# Patient Record
Sex: FEMALE | Race: WHITE | NOT HISPANIC OR LATINO | Employment: OTHER | URBAN - METROPOLITAN AREA
[De-identification: names, ages, dates, MRNs, and addresses within clinical notes are randomized per-mention and may not be internally consistent; named-entity substitution may affect disease eponyms.]

---

## 2017-02-26 ENCOUNTER — MYC MEDICAL ADVICE (OUTPATIENT)
Dept: FAMILY MEDICINE | Facility: CLINIC | Age: 67
End: 2017-02-26

## 2017-02-26 DIAGNOSIS — K92.1 BLOOD IN STOOL: Primary | ICD-10-CM

## 2017-02-27 ENCOUNTER — MYC MEDICAL ADVICE (OUTPATIENT)
Dept: FAMILY MEDICINE | Facility: CLINIC | Age: 67
End: 2017-02-27

## 2017-02-27 NOTE — TELEPHONE ENCOUNTER
Message handled by Nurse Triage with [employer] - provider name: MELINA LEE.    See Mychart response.      Becky Wilson RN    [facility]

## 2017-03-13 ENCOUNTER — TELEPHONE (OUTPATIENT)
Dept: FAMILY MEDICINE | Facility: CLINIC | Age: 67
End: 2017-03-13

## 2017-05-13 ENCOUNTER — OFFICE VISIT (OUTPATIENT)
Dept: FAMILY MEDICINE | Facility: CLINIC | Age: 67
End: 2017-05-13
Payer: COMMERCIAL

## 2017-05-13 VITALS
OXYGEN SATURATION: 100 % | HEART RATE: 104 BPM | DIASTOLIC BLOOD PRESSURE: 76 MMHG | TEMPERATURE: 99 F | SYSTOLIC BLOOD PRESSURE: 134 MMHG | BODY MASS INDEX: 29.84 KG/M2 | RESPIRATION RATE: 12 BRPM | WEIGHT: 152 LBS | HEIGHT: 60 IN

## 2017-05-13 DIAGNOSIS — Z87.19 HISTORY OF COLITIS: ICD-10-CM

## 2017-05-13 DIAGNOSIS — R19.7 DIARRHEA, UNSPECIFIED TYPE: Primary | ICD-10-CM

## 2017-05-13 LAB
BASOPHILS # BLD AUTO: 0 10E9/L (ref 0–0.2)
BASOPHILS NFR BLD AUTO: 0.2 %
DIFFERENTIAL METHOD BLD: NORMAL
EOSINOPHIL # BLD AUTO: 0.3 10E9/L (ref 0–0.7)
EOSINOPHIL NFR BLD AUTO: 3 %
ERYTHROCYTE [DISTWIDTH] IN BLOOD BY AUTOMATED COUNT: 13.7 % (ref 10–15)
ERYTHROCYTE [SEDIMENTATION RATE] IN BLOOD BY WESTERGREN METHOD: 15 MM/H (ref 0–30)
HCT VFR BLD AUTO: 39.6 % (ref 35–47)
HGB BLD-MCNC: 13.2 G/DL (ref 11.7–15.7)
LYMPHOCYTES # BLD AUTO: 1.4 10E9/L (ref 0.8–5.3)
LYMPHOCYTES NFR BLD AUTO: 14.9 %
MCH RBC QN AUTO: 27.8 PG (ref 26.5–33)
MCHC RBC AUTO-ENTMCNC: 33.3 G/DL (ref 31.5–36.5)
MCV RBC AUTO: 84 FL (ref 78–100)
MONOCYTES # BLD AUTO: 1.1 10E9/L (ref 0–1.3)
MONOCYTES NFR BLD AUTO: 11.5 %
NEUTROPHILS # BLD AUTO: 6.7 10E9/L (ref 1.6–8.3)
NEUTROPHILS NFR BLD AUTO: 70.4 %
PLATELET # BLD AUTO: 354 10E9/L (ref 150–450)
RBC # BLD AUTO: 4.74 10E12/L (ref 3.8–5.2)
WBC # BLD AUTO: 9.5 10E9/L (ref 4–11)

## 2017-05-13 PROCEDURE — 99214 OFFICE O/P EST MOD 30 MIN: CPT | Performed by: PHYSICIAN ASSISTANT

## 2017-05-13 PROCEDURE — 85652 RBC SED RATE AUTOMATED: CPT | Performed by: PHYSICIAN ASSISTANT

## 2017-05-13 PROCEDURE — 80053 COMPREHEN METABOLIC PANEL: CPT | Performed by: PHYSICIAN ASSISTANT

## 2017-05-13 PROCEDURE — 36415 COLL VENOUS BLD VENIPUNCTURE: CPT | Performed by: PHYSICIAN ASSISTANT

## 2017-05-13 PROCEDURE — 85025 COMPLETE CBC W/AUTO DIFF WBC: CPT | Performed by: PHYSICIAN ASSISTANT

## 2017-05-13 PROCEDURE — 86140 C-REACTIVE PROTEIN: CPT | Performed by: PHYSICIAN ASSISTANT

## 2017-05-13 PROCEDURE — 82272 OCCULT BLD FECES 1-3 TESTS: CPT | Performed by: PHYSICIAN ASSISTANT

## 2017-05-13 NOTE — NURSING NOTE
"Chief Complaint   Patient presents with     Diarrhea       Initial /76  Pulse 104  Temp 99  F (37.2  C) (Tympanic)  Resp 12  Ht 4' 11.5\" (1.511 m)  Wt 152 lb (68.9 kg)  LMP 06/03/2004  SpO2 100%  Breastfeeding? No  BMI 30.19 kg/m2 Estimated body mass index is 30.19 kg/(m^2) as calculated from the following:    Height as of this encounter: 4' 11.5\" (1.511 m).    Weight as of this encounter: 152 lb (68.9 kg).  Medication Reconciliation: complete   Lauren Bloedow LPN    "

## 2017-05-13 NOTE — MR AVS SNAPSHOT
After Visit Summary   5/13/2017    Jael Car    MRN: [id number]           Patient Information     Date Of Birth          1950        Visit Information        Provider Department      5/13/2017 11:00 AM Mary Jane Pineda PA-C [facility]        Today's Diagnoses     Diarrhea, unspecified type    -  1    History of colitis          Care Instructions    -Be seen urgently with fevers, severe abdominal pain, significant blood in stool        Follow-ups after your visit        Future tests that were ordered for you today     Open Future Orders        Priority Expected Expires Ordered    Enteric Bacteria and Virus Panel by ESTUARDO Stool Routine  5/13/2018 5/13/2017    Clostridium difficile Toxin B PCR Routine  5/13/2018 5/13/2017    CT Abdomen Pelvis w Contrast Routine  5/13/2018 5/13/2017            Who to contact     If you have questions or need follow up information about today's clinic visit or your schedule please contact [facility] directly at [phone].  Normal or non-critical lab and imaging results will be communicated to you by [employer], letter or phone within 4 business days after the clinic has received the results. If you do not hear from us within 7 days, please contact the clinic through [employer] or phone. If you have a critical or abnormal lab result, we will notify you by phone as soon as possible.  Submit refill requests through [employer] or call your pharmacy and they will forward the refill request to us. Please allow 3 business days for your refill to be completed.          Additional Information About Your Visit        MyChart Information     [employer] gives you secure access to your electronic health record. If you see a primary care provider, you can also send messages to your care team and make appointments. If you have questions, please call your primary care clinic.  If you do not have a primary care provider, please call [phone] and they  "will assist you.        Care EveryWhere ID     This is your Care EveryWhere ID. This could be used by other organizations to access your [place] medical records  [id number]        Your Vitals Were     Pulse Temperature Respirations Height Last Period Pulse Oximetry    104 99  F (37.2  C) (Tympanic) 12 4' 11.5\" (1.511 m) 06/03/2004 100%    Breastfeeding? BMI (Body Mass Index)                No 30.19 kg/m2           Blood Pressure from Last 3 Encounters:   05/13/17 134/76   11/25/16 138/84   10/04/16 138/82    Weight from Last 3 Encounters:   05/13/17 152 lb (68.9 kg)   11/25/16 160 lb (72.6 kg)   10/04/16 159 lb (72.1 kg)              We Performed the Following     CBC with platelets and differential     Comprehensive metabolic panel (BMP + Alb, Alk Phos, ALT, AST, Total. Bili, TP)     CRP, inflammation     ESR: Erythrocyte sedimentation rate     Occult blood stool        Primary Care Provider Office Phone # Fax #    Ken Gardiner [phone]       [facility] [address] [address]        Thank you!     Thank you for choosing [facility]  for your care. Our goal is always to provide you with excellent care. Hearing back from our patients is one way we can continue to improve our services. Please take a few minutes to complete the written survey that you may receive in the mail after your visit with us. Thank you!             Your Updated Medication List - Protect others around you: Learn how to safely use, store and throw away your medicines at www.disposemymeds.org.          This list is accurate as of: 5/13/17 11:37 AM.  Always use your most recent med list.                   Brand Name Dispense Instructions for use    aspirin EC 81 MG EC tablet      Take 81 mg by mouth daily.       CALCIUM GUMMIES 250-100-500 MG-MG-UNIT Chew   Generic drug:  Calcium-Phosphorus-Vitamin D      Take by mouth 2 times daily       * cholecalciferol 5000 UNITS Caps " capsule    vitamin D3     Take by mouth every other day       * VITAJOY DAILY D GUMMIES 1000 UNITS Chew   Generic drug:  cholecalciferol     30 tablet        multivitamin  peds with iron 60 MG chewable tablet      Take 1 chew tab by mouth daily.       simvastatin 40 MG tablet    ZOCOR    90 tablet    Take 1 tablet (40 mg) by mouth At Bedtime       * Notice:  This list has 2 medication(s) that are the same as other medications prescribed for you. Read the directions carefully, and ask your doctor or other care provider to review them with you.

## 2017-05-13 NOTE — PROGRESS NOTES
SUBJECTIVE:                                                    Jael Car is a 67 year old female who presents to clinic today for the following health issues:    Diarrhea     Onset: 2017    Description:   Consistency of stool: loose, yellow, explosive and mucousy  Blood in stool: YES  Number of loose stools in past 24 hours: 40    Progression of Symptoms:  worsening    Accompanying Signs & Symptoms:  Fever: no   Nausea or vomiting; no   Abdominal pain: YES  Episodes of constipation: yes  Weight loss: YES   History:   Ill contacts: no   Recent use of antibiotics: no    Recent travels: no          Recent medication-new or changes(Rx or OTC): no     Precipitating factors:   Hx of Blood in Stool    Alleviating factors:          Therapies Tried and outcome:  none; Outcome:     Diarrhea has been occurring approx every other day since 17.  Starts in the evening, around 6 or 7PM and then she is up all night. Has dozens of stools.  Small amounts, loose, runny.  Stools are urgent; has not made it to the bathroom a few times.  Stools yellow on Mon and Tu. Now brown-tan    Denies fevers or night sweats    Symptoms less severe when she doesn't eat, so she has been avoiding eating.    No blood in the toilet but sees blood with wiping.    Denies recent travel  Denies eating questionable foods  No nausea or vomiting    Has generalized abdominal cramping (upper abdomen, periumbilical). Denies LLQ pain.  Denies rectal pain, other than irritation from wiping    History of colitis--seen on previous colonoscopy, but patient had no symptoms at that time  Did see GI after that colonoscopy and they tried her on some medications. The first was too expensive, and she had a hard time tolerating the other one she tried.    Has lost 5 pounds due to not eating  Trying to drink fluids. Drank 7UP and ginger ale recently, which triggered symptoms.    Can't pass gas    Denies FH of GI problems    PSH: History of   sections  Still has gallbladder and appendix    Has been in busy season at work; increased stress    Problem list and histories reviewed & adjusted, as indicated.  Additional history: as documented    Patient Active Problem List   Diagnosis     Chondromalacia of patella     Hyperlipidemia LDL goal <130     Lumbago     Pulmonary embolism and infarction (H)     S/P shoulder surgery     Advanced directives, counseling/discussion     DVT (deep venous thrombosis) (H)     Rotator cuff (capsule) sprain     Osteopenia     Carpal tunnel syndrome     Hyperglycemia     Subclinical hyperthyroidism     Osteoarthrosis of knee     Obesity     History of pulmonary embolism     Primary osteoarthritis of right knee     MTHFR mutation (H)     History of deep venous thrombosis     Elevated serum protein level     Past Surgical History:   Procedure Laterality Date     COLONOSCOPY  09, 9/15    colitis, due 5 yrs     SURGICAL HISTORY OF -   2011    Rt Rotator Cuff       Social History   Substance Use Topics     Smoking status: Never Smoker     Smokeless tobacco: Never Used      Comment: 30 years ago     Alcohol use 1.2 - 2.4 oz/week     2 - 4 Standard drinks or equivalent per week      Comment: 1-2 drinks about 2 times/week     Family History   Problem Relation Age of Onset     Alcohol/Drug Father      Lipids Father      high cholesterol     DIABETES Paternal Grandmother      DIABETES Son      diet controlled     DIABETES Son          Current Outpatient Prescriptions   Medication Sig Dispense Refill     simvastatin (ZOCOR) 40 MG tablet Take 1 tablet (40 mg) by mouth At Bedtime 90 tablet 3     cholecalciferol (VITAJOY DAILY D GUMMIES) 1000 UNITS CHEW  30 tablet      Calcium-Phosphorus-Vitamin D (CALCIUM GUMMIES) 250-100-500 MG-MG-UNIT CHEW Take by mouth 2 times daily       cholecalciferol (VITAMIN D3) 5000 UNITS CAPS capsule Take by mouth every other day       multivitamin  peds with iron (FLINTSTONES COMPLETE) 60 MG chewable tablet Take 1  "chew tab by mouth daily.       aspirin EC 81 MG tablet Take 81 mg by mouth daily.       No Known Allergies    Reviewed and updated as needed this visit by clinical staff       Reviewed and updated as needed this visit by Provider         ROS:  Constitutional, HEENT, cardiovascular, pulmonary, gi and gu systems are negative, except as otherwise noted.    OBJECTIVE:                                                    /76  Pulse 104  Temp 99  F (37.2  C) (Tympanic)  Resp 12  Ht 4' 11.5\" (1.511 m)  Wt 152 lb (68.9 kg)  LMP 06/03/2004  SpO2 100%  Breastfeeding? No  BMI 30.19 kg/m2  Body mass index is 30.19 kg/(m^2).  GENERAL: healthy, alert and no distress  RESP: lungs clear to auscultation - no rales, rhonchi or wheezes  CV: regular rate and rhythm, normal S1 S2, no S3 or S4, no murmur, click or rub, no peripheral edema and peripheral pulses strong  ABDOMEN: tenderness LUQ, suprapubic and umbilical, no organomegaly or masses and bowel sounds normal  MS: no gross musculoskeletal defects noted, no edema  SKIN: no suspicious lesions or rashes  PSYCH: mentation appears normal, affect normal/bright    Diagnostic Test Results:   No results found for this or any previous visit (from the past 24 hour(s)).     ASSESSMENT/PLAN:                                                      1. Diarrhea, unspecified type  Most concerning for colitis flare, due to visual evidence of colitis on previous colonoscopy. Diverticulitis and infectious etiologies also on differential. Will check labs, including stool studies. Recommend CT scan on Mon/Tues; she prefers to start with this. Discussed that she may need GI follow-up and colonoscopy. Avoid anti-diarrheal medications at this time. Be seen urgently if fevers develop, severe abdominal pain develops, or significant bleeding occurs.  - CBC with platelets and differential  - ESR: Erythrocyte sedimentation rate  - CRP, inflammation  - Comprehensive metabolic panel (BMP + Alb, Alk " Phos, ALT, AST, Total. Bili, TP)  - Occult blood stool  - Enteric Bacteria and Virus Panel by ESTUARDO Stool; Future  - Clostridium difficile Toxin B PCR; Future  - CT Abdomen Pelvis w Contrast; Future    2. History of colitis  See above.  - CBC with platelets and differential  - ESR: Erythrocyte sedimentation rate  - CRP, inflammation  - Comprehensive metabolic panel (BMP + Alb, Alk Phos, ALT, AST, Total. Bili, TP)  - Occult blood stool  - Enteric Bacteria and Virus Panel by ESTUARDO Stool; Future  - Clostridium difficile Toxin B PCR; Future  - CT Abdomen Pelvis w Contrast; Future    See Patient Instructions    Mary Jane Pineda PA-C  [facility]

## 2017-05-15 LAB
ALBUMIN SERPL-MCNC: 3.5 G/DL (ref 3.4–5)
ALP SERPL-CCNC: 151 U/L (ref 40–150)
ALT SERPL W P-5'-P-CCNC: 57 U/L (ref 0–50)
ANION GAP SERPL CALCULATED.3IONS-SCNC: 10 MMOL/L (ref 3–14)
AST SERPL W P-5'-P-CCNC: 28 U/L (ref 0–45)
BILIRUB SERPL-MCNC: 0.3 MG/DL (ref 0.2–1.3)
BUN SERPL-MCNC: 11 MG/DL (ref 7–30)
CALCIUM SERPL-MCNC: 8.8 MG/DL (ref 8.5–10.1)
CHLORIDE SERPL-SCNC: 109 MMOL/L (ref 94–109)
CO2 SERPL-SCNC: 23 MMOL/L (ref 20–32)
CREAT SERPL-MCNC: 0.81 MG/DL (ref 0.52–1.04)
CRP SERPL-MCNC: 26 MG/L (ref 0–8)
GFR SERPL CREATININE-BSD FRML MDRD: 70 ML/MIN/1.7M2
GLUCOSE SERPL-MCNC: 115 MG/DL (ref 70–99)
POTASSIUM SERPL-SCNC: 3.3 MMOL/L (ref 3.4–5.3)
PROT SERPL-MCNC: 7.3 G/DL (ref 6.8–8.8)
SODIUM SERPL-SCNC: 142 MMOL/L (ref 133–144)

## 2017-05-16 NOTE — PROGRESS NOTES
Ms. Car,    -Liver and gallbladder tests (ALT,AST, Alk phos,bilirubin) are normal.  -Kidney function (GFR) is normal.  -Sodium is normal.  -Potassium is low - likely from the diarrhea.  -Glucose (diabetic screening test) is normal as you were not fasting.  -CRP is elevated and is a sign of inflammation.      If you have further questions about the interpretation of your labs, [email or web address] is a good website to check out for further information.    Let's proceed with the CT scan scheduled for 5/18/17 but if this is negative and the diarrhea persists, I agree with Mary Jane that you should have a colonoscopy to evaluate for the possibility of inflammatory bowel disease like colitis.    Please contact the clinic if you have additional questions.  Thank you.    Sincerely,    Juan Branch MD

## 2017-05-18 ENCOUNTER — HOSPITAL ENCOUNTER (OUTPATIENT)
Dept: CT IMAGING | Facility: CLINIC | Age: 67
Discharge: HOME | End: 2017-05-18
Attending: PHYSICIAN ASSISTANT | Admitting: PHYSICIAN ASSISTANT
Payer: MEDICARE

## 2017-05-18 DIAGNOSIS — Z87.19 HISTORY OF COLITIS: ICD-10-CM

## 2017-05-18 DIAGNOSIS — R19.7 DIARRHEA, UNSPECIFIED TYPE: ICD-10-CM

## 2017-05-18 PROCEDURE — 74177 CT ABD & PELVIS W/CONTRAST: CPT

## 2017-05-18 PROCEDURE — 25000128 H RX IP 250 OP 636: Performed by: PHYSICIAN ASSISTANT

## 2017-05-18 RX ORDER — IOPAMIDOL 755 MG/ML
500 INJECTION, SOLUTION INTRAVASCULAR ONCE
Status: COMPLETED | OUTPATIENT
Start: 2017-05-18 | End: 2017-05-18

## 2017-05-18 RX ADMIN — SODIUM CHLORIDE 49 ML: 9 INJECTION, SOLUTION INTRAVENOUS at 14:37

## 2017-05-18 RX ADMIN — IOPAMIDOL 76 ML: 755 INJECTION, SOLUTION INTRAVENOUS at 14:37

## 2017-05-20 ENCOUNTER — TELEPHONE (OUTPATIENT)
Dept: FAMILY MEDICINE | Facility: CLINIC | Age: 67
End: 2017-05-20

## 2017-05-20 DIAGNOSIS — K76.9 LIVER LESION, RIGHT LOBE: Primary | ICD-10-CM

## 2017-05-20 DIAGNOSIS — K51.30 ULCERATIVE RECTOSIGMOIDITIS WITHOUT COMPLICATION (H): ICD-10-CM

## 2017-05-20 RX ORDER — DIAZEPAM 10 MG
10 TABLET ORAL EVERY 6 HOURS PRN
Qty: 1 TABLET | Refills: 0 | Status: SHIPPED | OUTPATIENT
Start: 2017-05-20 | End: 2017-10-04

## 2017-05-20 NOTE — TELEPHONE ENCOUNTER
I called Jael with her result of her CT.  Her diarrhea has largely resolved.  CT shows new right liver lesion - recommended MRI to evaluate this further.  Order submitted.    Also having questions about how to optimally manage UC which she was diagnosed with in 2011.  Tried sulfasalazine in the past which she did not tolerate well.  Recommended referral back to Dr. Monae whom she saw once previously to discuss how to manage this.  Just manage flares when they arise?  Trial of alternate daily medication?  Instructed to call [facility] through [employer].    Juan Branch MD

## 2017-06-12 ENCOUNTER — HOSPITAL ENCOUNTER (OUTPATIENT)
Dept: MRI IMAGING | Facility: CLINIC | Age: 67
Discharge: HOME | End: 2017-06-12
Attending: FAMILY MEDICINE | Admitting: FAMILY MEDICINE
Payer: MEDICARE

## 2017-06-12 DIAGNOSIS — K76.9 LIVER LESION, RIGHT LOBE: ICD-10-CM

## 2017-06-12 PROCEDURE — 25000128 H RX IP 250 OP 636: Performed by: FAMILY MEDICINE

## 2017-06-12 PROCEDURE — A9585 GADOBUTROL INJECTION: HCPCS | Performed by: FAMILY MEDICINE

## 2017-06-12 PROCEDURE — 74183 MRI ABD W/O CNTR FLWD CNTR: CPT

## 2017-06-12 RX ORDER — GADOBUTROL 604.72 MG/ML
15 INJECTION INTRAVENOUS ONCE
Status: COMPLETED | OUTPATIENT
Start: 2017-06-12 | End: 2017-06-12

## 2017-06-12 RX ADMIN — GADOBUTROL 15 ML: 604.72 INJECTION INTRAVENOUS at 07:43

## 2017-06-13 NOTE — PROGRESS NOTES
Ms. Car,    As we'd hoped, the MRI confirms that the lesions seen on your liver on your recent CT scan are non-cancerous blood vessels called hemangiomas.  These are benign and nothing to worry about.  No need to do another scan to follow things up.      If you have further questions about the interpretation of your labs, [email or web address] is a good website to check out for further information.    Please contact the clinic if you have additional questions.  Thank you.    Sincerely,    Juan Branch MD

## 2017-08-22 ENCOUNTER — TRANSFERRED RECORDS (OUTPATIENT)
Dept: HEALTH INFORMATION MANAGEMENT | Facility: CLINIC | Age: 67
End: 2017-08-22

## 2017-09-27 ENCOUNTER — TRANSFERRED RECORDS (OUTPATIENT)
Dept: HEALTH INFORMATION MANAGEMENT | Facility: CLINIC | Age: 67
End: 2017-09-27

## 2017-10-04 ENCOUNTER — OFFICE VISIT (OUTPATIENT)
Dept: FAMILY MEDICINE | Facility: CLINIC | Age: 67
End: 2017-10-04
Payer: COMMERCIAL

## 2017-10-04 DIAGNOSIS — Z12.31 VISIT FOR SCREENING MAMMOGRAM: ICD-10-CM

## 2017-10-04 DIAGNOSIS — M15.0 PRIMARY OSTEOARTHRITIS INVOLVING MULTIPLE JOINTS: ICD-10-CM

## 2017-10-04 DIAGNOSIS — Z51.81 MEDICATION MONITORING ENCOUNTER: ICD-10-CM

## 2017-10-04 DIAGNOSIS — Z86.718 HISTORY OF DEEP VENOUS THROMBOSIS: ICD-10-CM

## 2017-10-04 DIAGNOSIS — Z12.11 SCREEN FOR COLON CANCER: ICD-10-CM

## 2017-10-04 DIAGNOSIS — E78.5 HYPERLIPIDEMIA LDL GOAL <130: ICD-10-CM

## 2017-10-04 DIAGNOSIS — R77.9 ELEVATED SERUM PROTEIN LEVEL: ICD-10-CM

## 2017-10-04 DIAGNOSIS — M85.89 OSTEOPENIA OF MULTIPLE SITES: ICD-10-CM

## 2017-10-04 DIAGNOSIS — Z15.89 MTHFR MUTATION: ICD-10-CM

## 2017-10-04 DIAGNOSIS — Z86.711 HISTORY OF PULMONARY EMBOLISM: ICD-10-CM

## 2017-10-04 DIAGNOSIS — E87.6 LOW BLOOD POTASSIUM: ICD-10-CM

## 2017-10-04 DIAGNOSIS — Z91.81 AT RISK FOR FALLING: ICD-10-CM

## 2017-10-04 DIAGNOSIS — Z12.39 SCREENING FOR BREAST CANCER: ICD-10-CM

## 2017-10-04 DIAGNOSIS — Z00.00 ENCOUNTER FOR ROUTINE ADULT HEALTH EXAMINATION WITHOUT ABNORMAL FINDINGS: Primary | ICD-10-CM

## 2017-10-04 DIAGNOSIS — K51.30 ULCERATIVE RECTOSIGMOIDITIS WITHOUT COMPLICATION (H): ICD-10-CM

## 2017-10-04 DIAGNOSIS — E05.90 SUBCLINICAL HYPERTHYROIDISM: ICD-10-CM

## 2017-10-04 LAB
ALBUMIN SERPL-MCNC: 3.5 G/DL (ref 3.4–5)
ALP SERPL-CCNC: 107 U/L (ref 40–150)
ALT SERPL W P-5'-P-CCNC: 24 U/L (ref 0–50)
ANION GAP SERPL CALCULATED.3IONS-SCNC: 9 MMOL/L (ref 3–14)
AST SERPL W P-5'-P-CCNC: 11 U/L (ref 0–45)
BILIRUB SERPL-MCNC: 0.4 MG/DL (ref 0.2–1.3)
BUN SERPL-MCNC: 16 MG/DL (ref 7–30)
CALCIUM SERPL-MCNC: 9 MG/DL (ref 8.5–10.1)
CHLORIDE SERPL-SCNC: 106 MMOL/L (ref 94–109)
CHOLEST SERPL-MCNC: 180 MG/DL
CK SERPL-CCNC: 31 U/L (ref 30–225)
CO2 SERPL-SCNC: 28 MMOL/L (ref 20–32)
CREAT SERPL-MCNC: 0.76 MG/DL (ref 0.52–1.04)
CRP SERPL-MCNC: <2.9 MG/L (ref 0–8)
DEPRECATED CALCIDIOL+CALCIFEROL SERPL-MC: 31 UG/L (ref 20–75)
ERYTHROCYTE [DISTWIDTH] IN BLOOD BY AUTOMATED COUNT: 14.1 % (ref 10–15)
ERYTHROCYTE [SEDIMENTATION RATE] IN BLOOD BY WESTERGREN METHOD: 9 MM/H (ref 0–30)
GFR SERPL CREATININE-BSD FRML MDRD: 75 ML/MIN/1.7M2
GLUCOSE SERPL-MCNC: 96 MG/DL (ref 70–99)
HCT VFR BLD AUTO: 39.6 % (ref 35–47)
HDLC SERPL-MCNC: 65 MG/DL
HGB BLD-MCNC: 13 G/DL (ref 11.7–15.7)
LDLC SERPL CALC-MCNC: 73 MG/DL
MCH RBC QN AUTO: 28.1 PG (ref 26.5–33)
MCHC RBC AUTO-ENTMCNC: 32.8 G/DL (ref 31.5–36.5)
MCV RBC AUTO: 86 FL (ref 78–100)
NONHDLC SERPL-MCNC: 115 MG/DL
PLATELET # BLD AUTO: 327 10E9/L (ref 150–450)
POTASSIUM SERPL-SCNC: 3.2 MMOL/L (ref 3.4–5.3)
PROT SERPL-MCNC: 7.2 G/DL (ref 6.8–8.8)
RBC # BLD AUTO: 4.62 10E12/L (ref 3.8–5.2)
SODIUM SERPL-SCNC: 143 MMOL/L (ref 133–144)
T3 SERPL-MCNC: 100 NG/DL (ref 60–181)
T4 FREE SERPL-MCNC: 1.22 NG/DL (ref 0.76–1.46)
TRIGL SERPL-MCNC: 208 MG/DL
TSH SERPL DL<=0.005 MIU/L-ACNC: 0.51 MU/L (ref 0.4–4)
WBC # BLD AUTO: 10.5 10E9/L (ref 4–11)

## 2017-10-04 PROCEDURE — 80053 COMPREHEN METABOLIC PANEL: CPT | Performed by: FAMILY MEDICINE

## 2017-10-04 PROCEDURE — 82550 ASSAY OF CK (CPK): CPT | Performed by: FAMILY MEDICINE

## 2017-10-04 PROCEDURE — 84439 ASSAY OF FREE THYROXINE: CPT | Performed by: FAMILY MEDICINE

## 2017-10-04 PROCEDURE — 85652 RBC SED RATE AUTOMATED: CPT | Performed by: FAMILY MEDICINE

## 2017-10-04 PROCEDURE — 85027 COMPLETE CBC AUTOMATED: CPT | Performed by: FAMILY MEDICINE

## 2017-10-04 PROCEDURE — 36415 COLL VENOUS BLD VENIPUNCTURE: CPT | Performed by: FAMILY MEDICINE

## 2017-10-04 PROCEDURE — 84443 ASSAY THYROID STIM HORMONE: CPT | Performed by: FAMILY MEDICINE

## 2017-10-04 PROCEDURE — 82306 VITAMIN D 25 HYDROXY: CPT | Performed by: FAMILY MEDICINE

## 2017-10-04 PROCEDURE — 86140 C-REACTIVE PROTEIN: CPT | Performed by: FAMILY MEDICINE

## 2017-10-04 PROCEDURE — G0439 PPPS, SUBSEQ VISIT: HCPCS | Performed by: FAMILY MEDICINE

## 2017-10-04 PROCEDURE — 84480 ASSAY TRIIODOTHYRONINE (T3): CPT | Performed by: FAMILY MEDICINE

## 2017-10-04 PROCEDURE — 80061 LIPID PANEL: CPT | Performed by: FAMILY MEDICINE

## 2017-10-04 RX ORDER — SIMVASTATIN 40 MG
40 TABLET ORAL AT BEDTIME
Qty: 90 TABLET | Refills: 3 | Status: SHIPPED | OUTPATIENT
Start: 2017-10-04 | End: 2018-10-31

## 2017-10-04 RX ORDER — PREDNISONE 20 MG/1
TABLET ORAL
Qty: 20 TABLET | Refills: 0 | COMMUNITY
Start: 2017-10-04 | End: 2018-10-31

## 2017-10-04 RX ORDER — BALSALAZIDE DISODIUM 750 MG/1
3000 CAPSULE ORAL 2 TIMES DAILY
COMMUNITY
Start: 2017-10-04

## 2017-10-04 NOTE — MR AVS SNAPSHOT
After Visit Summary   10/4/2017    Jael Car    MRN: [id number]           Patient Information     Date Of Birth          1950        Visit Information        Provider Department      10/4/2017 9:40 AM Ken Gardiner MD [facility] [place]        Today's Diagnoses     Encounter for routine adult health examination without abnormal findings    -  1    Ulcerative rectosigmoiditis 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 (H)        History of deep venous thrombosis        History of pulmonary embolism        MTHFR mutation (H)        Elevated serum protein level        Hyperlipidemia LDL goal <130        Osteopenia of multiple sites        Subclinical hyperthyroidism        Primary osteoarthritis involving multiple joints        Screening for breast cancer        Screen for colon cancer        Medication monitoring encounter        Visit for screening mammogram        At risk for falling          Care Instructions    Consider receiving Tetanus, influenza, and other vaccinations - as discussed    Follow up for Mammogram      [facility] [place]                        To reach your care team during and after hours:   [phone]  To reach our pharmacy:        [phone]    Clinic Hours                        Our clinic hours are:    Monday   7:30 am to 7:00 pm                  Tuesday through Friday 7:30 am to 5:00 pm                             Saturday   8:00 am to 12:00 pm      Sunday   Closed      Pharmacy Hours                        Our pharmacy hours are:    Monday   8:30 am to 7:00 pm       Tuesday to Friday  8:30 am to 6:00 pm                       Saturday    9:00 am to 1:00 pm              Sunday    Closed              There is also information available at our web site:  [email or web address]    If your provider ordered any lab tests and you do not receive the results within 10 business days, please call the clinic.    If you need a medication refill please contact your pharmacy.  Please allow  2-3 business days for your refill to be completed.    Our clinic offers telephone visits and e visits.  Please ask one of your team members to explain more.      Use [employer] (secure email communication and access to your chart) to send your primary care provider a message or make an appointment. Ask someone on your Team how to sign up for Cross[employer].  Immunizations                      Immunization History   Administered Date(s) Administered     Zoster vaccine, live 07/25/2013        Health Maintenance                         Health Maintenance Due   Topic Date Due     Pneumococcal Vaccine (1 of 2 - PCV13) 04/29/2016     Flu Vaccine - yearly  09/01/2017     FALL RISK ASSESSMENT  10/04/2017     Cholesterol Lab - yearly  10/04/2017           Services Typically covered by Medicare Recommended Completed   Vaccines    Pneumonoccol    Influenza    Hepatitis B (if medium/high risk)     Once for patients after age 65    Yearly  Medium/high risk factors:    End Stage Kidney Disease    Hemophiliacs who received Factor XIII or IX concentrates    Clients of institutions for developmentally disabled    Persons who live in same house as a Hepatitis B carrier    Homosexual men    Illicit injectable drug users    Health care workers     Mammogram Covered: One-time screen between age 35-39, annually for age 40+     Pap and Pelvic Exam Covered: Annually if  high risk,  or childbearing age with abnormal Pap in last 3 years.  Q24 months for all other women     Prostate Cancer Screening    Digital rectal exam    PSA Covered: Annually for all men > age 50     Corolrectal Cancer Screening Screening colonoscopy every 10 years, more often for high risk patients     Diabetes Self-Management Training Requires referral by treating physician for patient with diabetes     Diabetes Screening    Fasting blood sugar or glucose tolerance test   Once yearly, twice yearly if prediabetic     Cardiovascular Screening Blood Tests    Total  Cholesterol    HDL    Triglycerides Every 5 years     Medical Nutrition Therapy for Diabetes or Renal Disease Requires referral by treating physician for patient with diabetes or kidney disease     Glaucoma Screening Annually for patients with one of the following risk factors:    Diabetes Mellitus    Family history of Glaucoma    -American age 50 and over    -American age 65 and over     Bone Mass Measurement Every 24 months if one of the following risk factors:    Estrogen deficiency    Vertebral abnormalities on x-ray indicative of Osteoporosis, Osteopenia, or Vertebral fracture    Receiving/expected to receive the equivalent of at least 5 mg of Prednisone per day for > 3 months    Hyperparathyroidism    Patient being monitored for response to Osteoporosis Therapy     One-time AAA screen  Must be ordered as part of Medicare IPPE   Any patient with a family history of AAA    Males Age 65-75, with history of smoking at least 100 cigarettes in lifetime     Smoking Cessation Counseling Beneficiaries who use tobacco are eligible to receive 2 cessation attempts per year; each attempt includes maximum of 4 sessions     HIV Screening Annually for beneficiaries at increased risk:       Increased risk for HIV infection is defined in the  National Coverage Determinations (NCD) Manual,  Publication 100-03 Sections 190.14 (diagnostic) and 210.7 (screening). See http://www.cms.gov/manuals/downloads/fll308f6_Pvwb9.pdf and http://www.cms.gov/manuals/downloads/lwb592e4_Crmz1.pdf on the Internet.  Three times per pregnancy for beneficiaries who are pregnant.     Future Annual Wellness Visit Annually, for all beneficiaries.       Preventive Health Recommendations    Female Ages 65 +    Yearly exam:     See your health care provider every year in order to  o Review health changes.   o Discuss preventive care.    o Review your medicines if your doctor has prescribed any.      You no longer need a yearly Pap test unless  you've had an abnormal Pap test in the past 10 years. If you have vaginal symptoms, such as bleeding or discharge, be sure to talk with your provider about a Pap test.      Every 1 to 2 years, have a mammogram.  If you are over 69, talk with your health care provider about whether or not you want to continue having screening mammograms.      Every 10 years, have a colonoscopy. Or, have a yearly FIT test (stool test). These exams will check for colon cancer.       Have a cholesterol test every 5 years, or more often if your doctor advises it.       Have a diabetes test (fasting glucose) every three years. If you are at risk for diabetes, you should have this test more often.       At age 65, have a bone density scan (DEXA) to check for osteoporosis (brittle bone disease).    Shots:    Get a flu shot each year.    Get a tetanus shot every 10 years.    Talk to your doctor about your pneumonia vaccines. There are now two you should receive - Pneumovax (PPSV 23) and Prevnar (PCV 13).    Talk to your doctor about the shingles vaccine.    Talk to your doctor about the hepatitis B vaccine.    Nutrition:     Eat at least 5 servings of fruits and vegetables each day.      Eat whole-grain bread, whole-wheat pasta and brown rice instead of white grains and rice.      Talk to your provider about Calcium and Vitamin D.     Lifestyle    Exercise at least 150 minutes a week (30 minutes a day, 5 days a week). This will help you control your weight and prevent disease.      Limit alcohol to one drink per day.      No smoking.       Wear sunscreen to prevent skin cancer.       See your dentist twice a year for an exam and cleaning.      See your eye doctor every 1 to 2 years to screen for conditions such as glaucoma, macular degeneration and cataracts.          Follow-ups after your visit        Future tests that were ordered for you today     Open Future Orders        Priority Expected Expires Ordered    Fecal colorectal cancer  screen (FIT) Routine 10/25/2017 12/27/2017 10/4/2017    MA SCREENING DIGITAL BILAT - Future  (s+30) Routine  10/4/2018 10/4/2017            Who to contact     If you have questions or need follow up information about today's clinic visit or your schedule please contact [facility] directly at [phone].  Normal or non-critical lab and imaging results will be communicated to you by MyChart, letter or phone within 4 business days after the clinic has received the results. If you do not hear from us within 7 days, please contact the clinic through [employer]ar[employer] or phone. If you have a critical or abnormal lab result, we will notify you by phone as soon as possible.  Submit refill requests through [employer] or call your pharmacy and they will forward the refill request to us. Please allow 3 business days for your refill to be completed.          Additional Information About Your Visit        [employer] Information     [employer] gives you secure access to your electronic health record. If you see a primary care provider, you can also send messages to your care team and make appointments. If you have questions, please call your primary care clinic.  If you do not have a primary care provider, please call [phone] and they will assist you.        Care EveryWhere ID     This is your Care EveryWhere ID. This could be used by other organizations to access your [place] medical records  [id number]        Your Vitals Were     Last Period                   06/03/2004            Blood Pressure from Last 3 Encounters:   05/13/17 134/76   11/25/16 138/84   10/04/16 138/82    Weight from Last 3 Encounters:   05/13/17 152 lb (68.9 kg)   11/25/16 160 lb (72.6 kg)   10/04/16 159 lb (72.1 kg)              We Performed the Following     *UA reflex to Microscopic and Culture ([place] [facility] (except [place] and [place])     Albumin Random Urine Quantitative with Creat Ratio     CBC with platelets     CK total      Comprehensive metabolic panel     CRP inflammation     Erythrocyte sedimentation rate auto     Lipid panel reflex to direct LDL     T3, total     T4, free     TSH with free T4 reflex     Vitamin D Deficiency          Where to get your medicines      These medications were sent to [facility]  [place] [facility] - [address]  [address] [place] [address]     Phone:  [phone]     simvastatin 40 MG tablet          Primary Care Provider Office Phone # Fax #    Ken Gardiner [phone]       [address] [place] [address]        Equal Access to Services     [facility]: Hadii aad ku hadasho Soomaali, waaxda luqadaha, qaybta kaalmada adeegyada, waxyumiko roberts . So [facility] [phone].    ATENCIÓN: Si habla español, tiene a gary disposición servicios gratuitos de asistencia lingüística. [facility] [phone].    We comply with applicable federal civil rights laws and [place] laws. We do not discriminate on the basis of race, color, national origin, age, disability, sex, sexual orientation, or gender identity.            Thank you!     Thank you for choosing [facility]  for your care. Our goal is always to provide you with excellent care. Hearing back from our patients is one way we can continue to improve our services. Please take a few minutes to complete the written survey that you may receive in the mail after your visit with us. Thank you!             Your Updated Medication List - Protect others around you: Learn how to safely use, store and throw away your medicines at www.disposemymeds.org.          This list is accurate as of: 10/4/17 10:34 AM.  Always use your most recent med list.                   Brand Name Dispense Instructions for use Diagnosis    aspirin EC 81 MG EC tablet      Take 81 mg by mouth daily.        balsalazide 750 MG capsule    COLAZAL     Take 4 capsules (3,000 mg) by mouth 2 times  daily        CALCIUM GUMMIES 250-100-500 MG-MG-UNIT Chew   Generic drug:  Calcium-Phosphorus-Vitamin D      Take by mouth 2 times daily        * cholecalciferol 5000 UNITS Caps capsule    vitamin D3     Take by mouth every other day        * VITAJOY DAILY D GUMMIES 1000 UNITS Chew   Generic drug:  cholecalciferol     30 tablet         multivitamin  peds with iron 60 MG chewable tablet      Take 1 chew tab by mouth daily.        predniSONE 20 MG tablet    DELTASONE    20 tablet    Take 60 mg daily for 3 days, then 40mg daily for 3 days, then 20 mg daily for 3 days, then 10 mg for 4 days        simvastatin 40 MG tablet    ZOCOR    90 tablet    Take 1 tablet (40 mg) by mouth At Bedtime    Hyperlipidemia LDL goal <130       * Notice:  This list has 2 medication(s) that are the same as other medications prescribed for you. Read the directions carefully, and ask your doctor or other care provider to review them with you.

## 2017-10-04 NOTE — PROGRESS NOTES
SUBJECTIVE:   Jael Car is a 67 year old female who presents for Preventive Visit.    Are you in the first 12 months of your Medicare coverage?  No    Physical   Annual:     Getting at least 3 servings of Calcium per day::  Yes    Bi-annual eye exam::  Yes    Dental care twice a year::  Yes    Sleep apnea or symptoms of sleep apnea::  None    Diet::  Regular (no restrictions)    Frequency of exercise::  None    Taking medications regularly::  Yes    Medication side effects::  Not applicable    Additional concerns today::  No    Hx of Clotting Dx/DVT -- No concerns at this time, reviewed past care.     Ulcerative Colitis -- Followed by [facility] (Elfego/Paco/Dov), discussed recent care. Started Colazal 3,000 mg twice daily. Recent flex sig - ulcerative rectosigmoiditis    [facility] Note 9/12 --       Lipids -- Simvastatin 40 mg daily, Aspirin 81 mg daily.     Recent Labs   Lab Test  10/04/16   0922  08/14/15   0913  07/30/14   0828   CHOL  160  156  161   HDL  46*  55  47*   LDL  89  78  87   TRIG  126  116  137   CHOLHDLRATIO   --   2.8  3.4       Past/recent records reviewed and discussed for -- family hx, social hx, surgical hx, medications, immunizations, allergies.      COGNITIVE SCREEN  1) Repeat 3 items (Banana, Sunrise, Chair)    2) Clock draw: NORMAL  3) 3 item recall: Recalls 2 objects   Results: NORMAL clock, 1-2 items recalled: COGNITIVE IMPAIRMENT LESS LIKELY    Mini-CogTM Copyright S Mario. Licensed by the author for use in [facility]; reprinted with permission ([email or web address]). All rights reserved.      Reviewed and updated as needed this visit by clinical staffAllergies         Reviewed and updated as needed this visit by Provider  Allergies        Social History   Substance Use Topics     Smoking status: Never Smoker     Smokeless tobacco: Never Used      Comment: 30 years ago     Alcohol use 1.2 - 2.4 oz/week     2 - 4 Standard drinks or equivalent per week      Comment: 1-2 drinks about  2 times/week     The patient does not drink >3 drinks per day nor >7 drinks per week.    Today's PHQ-2 Score:   PHQ-2 ( 1999 Pfizer) 10/1/2017   Q1: Little interest or pleasure in doing things 0   Q2: Feeling down, depressed or hopeless 0   PHQ-2 Score 0   Q1: Little interest or pleasure in doing things Not at all   Q2: Feeling down, depressed or hopeless Not at all   PHQ-2 Score 0     Do you feel safe in your environment - Yes    Do you have a Health Care Directive?: Yes: Patient states has Advance Directive and will bring in a copy to clinic.    Current providers sharing in care for this patient include:   Patient Care Team:  Ken Gardiner MD as PCP - General (Family Practice)      Hearing impairment: No    Ability to successfully perform activities of daily living: Yes, no assistance needed     Fall risk:  Fallen 2 or more times in the past year?: No  Any fall with injury in the past year?: No    Home safety:  none identified    The following health maintenance items are reviewed in Epic and correct as of today:  Health Maintenance   Topic Date Due     PNEUMOCOCCAL (1 of 2 - PCV13) 04/29/2016     FALL RISK ASSESSMENT  10/04/2017     LIPID MONITORING Q1 YEAR  10/04/2017     FIT Q1 YR  10/04/2017     MAMMO Q1 YR  10/21/2017     ALT Q1 YR  05/13/2018     PAP Q3 YR  08/14/2018     DEXA Q3 YR  10/26/2019     COLONOSCOPY Q5 YR  09/11/2020     ADVANCE DIRECTIVE PLANNING Q5 YRS  10/04/2022     TETANUS IMMUNIZATION (SYSTEM ASSIGNED)  04/16/2024     INFLUENZA VACCINE (SYSTEM ASSIGNED)  Addressed     HEPATITIS C SCREENING  Completed       Health Maintenance     Colonoscopy:  5 years, due 9/20 (last 9/15)   FIT:  Yearly, due (last 10/16)              Mammogram:  Yearly, due (last 10/16)              PAP:  3 years, due 8/18 (last 8/15)   DEXA:  3 years, due 10/19 (last 10/16)    Health Maintenance Due   Topic Date Due     PNEUMOCOCCAL (1 of 2 - PCV13) 04/29/2016     FALL RISK ASSESSMENT  10/04/2017     LIPID MONITORING Q1 YEAR   10/04/2017     FIT Q1 YR  10/04/2017     MAMMO Q1 YR  10/21/2017       Current Problem List    Patient Active Problem List   Diagnosis     Chondromalacia of patella     Hyperlipidemia LDL goal <130     Lumbago     Pulmonary embolism and infarction (H)     S/P shoulder surgery     Advanced directives, counseling/discussion     DVT (deep venous thrombosis) (H)     Rotator cuff (capsule) sprain     Osteopenia     Carpal tunnel syndrome     Hyperglycemia     Subclinical hyperthyroidism     Osteoarthrosis of knee     Obesity     History of pulmonary embolism     Primary osteoarthritis of right knee     MTHFR mutation (H)     History of deep venous thrombosis     Elevated serum protein level     Ulcerative rectosigmoiditis 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 (H)       Past Medical History    Past Medical History:   Diagnosis Date     DVT (deep venous thrombosis) (H) 2011    PE - postop     Elevated serum protein level     Protein S     Hyperlipidemia LDL goal < 130 2006     MTHFR mutation (H)      Osteoarthrosis of knee 2014    Dr Augustine     Osteopenia      Pulmonary emboli (H) 2011    Post op     Subclinical hyperthyroidism 2014     Ulcerative rectosigmoiditis 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 (H) 2011, 9/17    [facility] - Dr Telles/Paco       Past Surgical History    Past Surgical History:   Procedure Laterality Date     COLONOSCOPY  09, 9/15    colitis, due 5 yrs     SIGMOIDOSCOPY FLEXIBLE  09/2017    rectosigmoiditis     SURGICAL HISTORY OF -  Right 2011    Rt Rotator Cuff       Current Medications    Current Outpatient Prescriptions   Medication Sig Dispense Refill     predniSONE (DELTASONE) 20 MG tablet Take 60 mg daily for 3 days, then 40mg daily for 3 days, then 20 mg daily for 3 days, then 10 mg for 4 days 20 tablet 0     balsalazide (COLAZAL) 750 MG capsule Take 4 capsules (3,000 mg) by mouth 2 times daily       simvastatin (ZOCOR) 40 MG tablet Take 1 tablet (40 mg) by mouth At Bedtime 90 tablet 3     cholecalciferol (VITAJOY DAILY D NEMESIO)  1000 UNITS CHEW  30 tablet      Calcium-Phosphorus-Vitamin D (CALCIUM GUMMIES) 250-100-500 MG-MG-UNIT CHEW Take by mouth 2 times daily       cholecalciferol (VITAMIN D3) 5000 UNITS CAPS capsule Take by mouth every other day       multivitamin  peds with iron (FLINTSTONES COMPLETE) 60 MG chewable tablet Take 1 chew tab by mouth daily.       aspirin EC 81 MG tablet Take 81 mg by mouth daily.       [DISCONTINUED] simvastatin (ZOCOR) 40 MG tablet Take 1 tablet (40 mg) by mouth At Bedtime 90 tablet 3       Allergies    No Known Allergies    Immunizations    Immunization History   Administered Date(s) Administered     Zoster vaccine, live 07/25/2013       Family History    Family History   Problem Relation Age of Onset     Alcohol/Drug Father      Lipids Father      high cholesterol     DIABETES Paternal Grandmother      DIABETES Son      diet controlled     Lung Cancer Maternal Grandmother      smoker       Social History    Social History     Social History     Marital status:      Spouse name: Anand     Number of children: Mayank     Years of education: 12     Occupational History     Not on file.     Social History Main Topics     Smoking status: Never Smoker     Smokeless tobacco: Never Used      Comment: 30 years ago     Alcohol use 1.2 - 2.4 oz/week     2 - 4 Standard drinks or equivalent per week      Comment: 1-2 drinks about 2 times/week     Drug use: No     Sexual activity: No     Other Topics Concern     Parent/Sibling W/ Cabg, Mi Or Angioplasty Before 65f 55m? No     Caffeine Concern Yes     2-4 cans per week     Exercise Yes     6,000 to 9,000 steps daily     Seat Belt Yes     Social History Narrative       ROS:  Constitutional, HEENT, cardiovascular, pulmonary, GI, , musculoskeletal, neuro, skin, endocrine and psych systems are negative, except as in HPI or otherwise noted     This document serves as a record of the services and decisions personally performed and made by Ken Gardiner MD FAAFP. It was  "created on their behalf by David Schmidt, a trained medical scribe. The creation of this document is based the provider's statements to the medical scribe.  David Schmidt October 4, 2017 10:14 AM    OBJECTIVE:   LMP 06/03/2004 Estimated body mass index is 30.19 kg/(m^2) as calculated from the following:    Height as of 5/13/17: 4' 11.5\" (1.511 m).    Weight as of 5/13/17: 152 lb (68.9 kg).  EXAM:   GENERAL APPEARANCE: healthy, alert and no distress, obese   HENT: ear canals and TM's normal with impacted cerumen bilaterally upon viewing with otoscope, nose and mouth without ulcers or lesions upon viewing with otoscope  RESP: lungs clear to auscultation - no rales, rhonchi or wheezes  BREAST: normal without masses, tenderness or nipple discharge and no palpable axillary masses or adenopathy  CV: regular rate and rhythm, normal S1 S2, no S3 or S4, no murmur, click or rub, no peripheral edema and peripheral pulses strong - no carotid bruits  ABDOMEN: soft, nontender, no hepatosplenomegaly, no masses and bowel sounds normal   (female): deferred per pt, will do in 8/2018  MS: no musculoskeletal defects are noted and gait is age appropriate without ataxia, no edema  SKIN: no suspicious lesions or rashes to visible skin  BACK: no CVA tenderness, no paralumbar tenderness  NEURO: mentation intact and speech normal  PSYCH: mentation appears normal and affect normal/bright    ASSESSMENT / PLAN:       ICD-10-CM    1. Encounter for routine adult health examination without abnormal findings Z00.00 Comprehensive metabolic panel     Lipid panel reflex to direct LDL     CK total     CBC with platelets     TSH with free T4 reflex     Albumin Random Urine Quantitative with Creat Ratio     *UA reflex to Microscopic and Culture ([place] [facility] (except [place] and [place])     Fecal colorectal cancer screen (FIT)     Erythrocyte sedimentation rate auto     CRP inflammation     T4, free     Vitamin D Deficiency     T3, total "   2. Ulcerative rectosigmoiditis 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 (H) K51.30 Comprehensive metabolic panel     CBC with platelets     Erythrocyte sedimentation rate auto     CRP inflammation   3. History of deep venous thrombosis Z86.718 CBC with platelets   4. History of pulmonary embolism Z86.711 CBC with platelets   5. MTHFR mutation (H) E72.12 CBC with platelets   6. Elevated serum protein level R77.9    7. Hyperlipidemia LDL goal <130 E78.5 Comprehensive metabolic panel     Lipid panel reflex to direct LDL     CK total     simvastatin (ZOCOR) 40 MG tablet   8. Osteopenia of multiple sites M85.89 Comprehensive metabolic panel     Vitamin D Deficiency   9. Subclinical hyperthyroidism E05.90 TSH with free T4 reflex     T4, free     T3, total   10. Primary osteoarthritis involving multiple joints M15.0    11. Screening for breast cancer Z12.31    12. Screen for colon cancer Z12.11 Fecal colorectal cancer screen (FIT)   13. Medication monitoring encounter Z51.81 Comprehensive metabolic panel     Lipid panel reflex to direct LDL     CK total     CBC with platelets     TSH with free T4 reflex     Albumin Random Urine Quantitative with Creat Ratio     *UA reflex to Microscopic and Culture ([facility] (except [place] and [place])     Erythrocyte sedimentation rate auto     CRP inflammation     T4, free     Vitamin D Deficiency     T3, total   14. Visit for screening mammogram Z12.31 MA SCREENING DIGITAL BILAT - Future  (s+30)   15. At risk for falling Z91.81      Discussed treatment/modality options, including risk and benefits, she desires advised alcohol consumption 1oz per day or less, advised aspirin 81 mg po daily, advised 1 multivitamin per day, advised calcium 6712-6954 mg/d and Vitamin D 800-1200 IU/d, advised dentist every 6 months, advised diet, exercise, and weight loss, advised opthalmologist every 1-2 years, advised self breast exam q month, further diagnostic(s), further health care maintenance,  "further lab(s), medication refill(s) and observation. All diagnosis above reviewed and noted above, otherwise stable.  See [facility] orders for further details.  Follow up in 4-6 month(s) and as needed.    Pt declined all vaccinations today -- Tetanus, influenza, zostavax, pneumovax, prevnar    Health Maintenance Due   Topic Date Due     PNEUMOCOCCAL (1 of 2 - PCV13) 04/29/2016     FALL RISK ASSESSMENT  10/04/2017     LIPID MONITORING Q1 YEAR  10/04/2017     FIT Q1 YR  10/04/2017     MAMMO Q1 YR  10/21/2017     End of Life Planning:  Patient currently has an advanced directive: none on file     COUNSELING:  Reviewed preventive health counseling, as reflected in patient instructions    BP Screening:   Last 3 BP Readings:    BP Readings from Last 3 Encounters:   05/13/17 134/76   11/25/16 138/84   10/04/16 138/82     The following was recommended to the patient:  Re-screen BP within a year and recommended lifestyle modifications      Estimated body mass index is 30.19 kg/(m^2) as calculated from the following:    Height as of 5/13/17: 4' 11.5\" (1.511 m).    Weight as of 5/13/17: 152 lb (68.9 kg).  Weight management plan: Discussed healthy diet and exercise guidelines and patient will follow up in 12 months in clinic to re-evaluate.   reports that she has never smoked. She has never used smokeless tobacco.    Appropriate preventive services were discussed with this patient, including applicable screening as appropriate for cardiovascular disease, diabetes, osteopenia/osteoporosis, and glaucoma.  As appropriate for age/gender, discussed screening for colorectal cancer, prostate cancer, breast cancer, and cervical cancer. Checklist reviewing preventive services available has been given to the patient.    Reviewed patients plan of care and provided an AVS. The Basic Care Plan (routine screening as documented in Health Maintenance) for Jael meets the Care Plan requirement. This Care Plan has been established and reviewed " with the Patient.    Counseling Resources:  ATP IV Guidelines  Pooled Cohorts Equation Calculator  Breast Cancer Risk Calculator  FRAX Risk Assessment  ICSI Preventive Guidelines  Dietary Guidelines for Americans, 2010  USDA's MyPlate  ASA Prophylaxis  Lung CA Screening    The information in this document, created by the medical scribe for me, accurately reflects the services I personally performed and the decisions made by me. I have reviewed and approved this document for accuracy.   Ken Gardiner MD [facility]            Ken Gardiner MD, [address]  [address] [phone] Fax

## 2017-10-04 NOTE — PATIENT INSTRUCTIONS
Consider receiving Tetanus, influenza, and other vaccinations - as discussed    Follow up for Mammogram      [facility]                        To reach your care team during and after hours:   [phone]  To reach our pharmacy:        [phone]    Clinic Hours                        Our clinic hours are:    Monday   7:30 am to 7:00 pm                  Tuesday through Friday 7:30 am to 5:00 pm                             Saturday   8:00 am to 12:00 pm      Sunday   Closed      Pharmacy Hours                        Our pharmacy hours are:    Monday   8:30 am to 7:00 pm       Tuesday to Friday  8:30 am to 6:00 pm                       Saturday    9:00 am to 1:00 pm              Sunday    Closed              There is also information available at our web site:  [email or web address]    If your provider ordered any lab tests and you do not receive the results within 10 business days, please call the clinic.    If you need a medication refill please contact your pharmacy.  Please allow 2-3 business days for your refill to be completed.    Our clinic offers telephone visits and e visits.  Please ask one of your team members to explain more.      Use [employer] (secure email communication and access to your chart) to send your primary care provider a message or make an appointment. Ask someone on your Team how to sign up for [employer].  Immunizations                      Immunization History   Administered Date(s) Administered     Zoster vaccine, live 07/25/2013        Health Maintenance                         Health Maintenance Due   Topic Date Due     Pneumococcal Vaccine (1 of 2 - PCV13) 04/29/2016     Flu Vaccine - yearly  09/01/2017     FALL RISK ASSESSMENT  10/04/2017     Cholesterol Lab - yearly  10/04/2017           Services Typically covered by Medicare Recommended Completed   Vaccines    Pneumonoccol    Influenza    Hepatitis B (if medium/high risk)     Once for patients after age 65    Yearly   Medium/high risk factors:    End Stage Kidney Disease    Hemophiliacs who received Factor XIII or IX concentrates    Clients of institutions for developmentally disabled    Persons who live in same house as a Hepatitis B carrier    Homosexual men    Illicit injectable drug users    Health care workers     Mammogram Covered: One-time screen between age 35-39, annually for age 40+     Pap and Pelvic Exam Covered: Annually if  high risk,  or childbearing age with abnormal Pap in last 3 years.  Q24 months for all other women     Prostate Cancer Screening    Digital rectal exam    PSA Covered: Annually for all men > age 50     Corolrectal Cancer Screening Screening colonoscopy every 10 years, more often for high risk patients     Diabetes Self-Management Training Requires referral by treating physician for patient with diabetes     Diabetes Screening    Fasting blood sugar or glucose tolerance test   Once yearly, twice yearly if prediabetic     Cardiovascular Screening Blood Tests    Total Cholesterol    HDL    Triglycerides Every 5 years     Medical Nutrition Therapy for Diabetes or Renal Disease Requires referral by treating physician for patient with diabetes or kidney disease     Glaucoma Screening Annually for patients with one of the following risk factors:    Diabetes Mellitus    Family history of Glaucoma    -American age 50 and over    -American age 65 and over     Bone Mass Measurement Every 24 months if one of the following risk factors:    Estrogen deficiency    Vertebral abnormalities on x-ray indicative of Osteoporosis, Osteopenia, or Vertebral fracture    Receiving/expected to receive the equivalent of at least 5 mg of Prednisone per day for > 3 months    Hyperparathyroidism    Patient being monitored for response to Osteoporosis Therapy     One-time AAA screen  Must be ordered as part of Medicare IPPE   Any patient with a family history of AAA    Males Age 65-75, with history of smoking at  least 100 cigarettes in lifetime     Smoking Cessation Counseling Beneficiaries who use tobacco are eligible to receive 2 cessation attempts per year; each attempt includes maximum of 4 sessions     HIV Screening Annually for beneficiaries at increased risk:       Increased risk for HIV infection is defined in the  National Coverage Determinations (NCD) Manual,  Publication 100-03 Sections 190.14 (diagnostic) and 210.7 (screening). See http://www.cms.gov/manuals/downloads/ecb561r5_Boio5.pdf and http://www.cms.gov/manuals/downloads/lip993k1_Mpmr6.pdf on the Internet.  Three times per pregnancy for beneficiaries who are pregnant.     Future Annual Wellness Visit Annually, for all beneficiaries.       Preventive Health Recommendations    Female Ages 65 +    Yearly exam:     See your health care provider every year in order to  o Review health changes.   o Discuss preventive care.    o Review your medicines if your doctor has prescribed any.      You no longer need a yearly Pap test unless you've had an abnormal Pap test in the past 10 years. If you have vaginal symptoms, such as bleeding or discharge, be sure to talk with your provider about a Pap test.      Every 1 to 2 years, have a mammogram.  If you are over 69, talk with your health care provider about whether or not you want to continue having screening mammograms.      Every 10 years, have a colonoscopy. Or, have a yearly FIT test (stool test). These exams will check for colon cancer.       Have a cholesterol test every 5 years, or more often if your doctor advises it.       Have a diabetes test (fasting glucose) every three years. If you are at risk for diabetes, you should have this test more often.       At age 65, have a bone density scan (DEXA) to check for osteoporosis (brittle bone disease).    Shots:    Get a flu shot each year.    Get a tetanus shot every 10 years.    Talk to your doctor about your pneumonia vaccines. There are now two you should  receive - Pneumovax (PPSV 23) and Prevnar (PCV 13).    Talk to your doctor about the shingles vaccine.    Talk to your doctor about the hepatitis B vaccine.    Nutrition:     Eat at least 5 servings of fruits and vegetables each day.      Eat whole-grain bread, whole-wheat pasta and brown rice instead of white grains and rice.      Talk to your provider about Calcium and Vitamin D.     Lifestyle    Exercise at least 150 minutes a week (30 minutes a day, 5 days a week). This will help you control your weight and prevent disease.      Limit alcohol to one drink per day.      No smoking.       Wear sunscreen to prevent skin cancer.       See your dentist twice a year for an exam and cleaning.      See your eye doctor every 1 to 2 years to screen for conditions such as glaucoma, macular degeneration and cataracts.

## 2017-10-13 ENCOUNTER — TRANSFERRED RECORDS (OUTPATIENT)
Dept: HEALTH INFORMATION MANAGEMENT | Facility: CLINIC | Age: 67
End: 2017-10-13

## 2017-10-13 DIAGNOSIS — R31.9 BLOOD IN URINE: Primary | ICD-10-CM

## 2017-10-13 DIAGNOSIS — E87.6 LOW BLOOD POTASSIUM: ICD-10-CM

## 2017-10-13 DIAGNOSIS — Z51.81 MEDICATION MONITORING ENCOUNTER: ICD-10-CM

## 2017-10-13 DIAGNOSIS — Z00.00 ENCOUNTER FOR ROUTINE ADULT HEALTH EXAMINATION WITHOUT ABNORMAL FINDINGS: ICD-10-CM

## 2017-10-13 DIAGNOSIS — E78.5 HYPERLIPIDEMIA LDL GOAL <130: ICD-10-CM

## 2017-10-13 LAB
ALBUMIN UR-MCNC: NEGATIVE MG/DL
APPEARANCE UR: CLEAR
BACTERIA #/AREA URNS HPF: ABNORMAL /HPF
BILIRUB UR QL STRIP: NEGATIVE
CHOLEST SERPL-MCNC: 184 MG/DL
COLOR UR AUTO: YELLOW
CREAT UR-MCNC: 102 MG/DL
GLUCOSE UR STRIP-MCNC: NEGATIVE MG/DL
HDLC SERPL-MCNC: 60 MG/DL
HGB UR QL STRIP: ABNORMAL
KETONES UR STRIP-MCNC: NEGATIVE MG/DL
LDLC SERPL CALC-MCNC: 92 MG/DL
LEUKOCYTE ESTERASE UR QL STRIP: NEGATIVE
MICROALBUMIN UR-MCNC: 5 MG/L
MICROALBUMIN/CREAT UR: 5.07 MG/G CR (ref 0–25)
MUCOUS THREADS #/AREA URNS LPF: PRESENT /LPF
NITRATE UR QL: NEGATIVE
NON-SQ EPI CELLS #/AREA URNS LPF: ABNORMAL /LPF
NONHDLC SERPL-MCNC: 124 MG/DL
PH UR STRIP: 5.5 PH (ref 5–7)
POTASSIUM SERPL-SCNC: 3.5 MMOL/L (ref 3.4–5.3)
RBC #/AREA URNS AUTO: ABNORMAL /HPF
SOURCE: ABNORMAL
SP GR UR STRIP: 1.02 (ref 1–1.03)
TRIGL SERPL-MCNC: 160 MG/DL
UROBILINOGEN UR STRIP-ACNC: 0.2 EU/DL (ref 0.2–1)
WBC #/AREA URNS AUTO: ABNORMAL /HPF

## 2017-10-13 PROCEDURE — 81001 URINALYSIS AUTO W/SCOPE: CPT | Performed by: FAMILY MEDICINE

## 2017-10-13 PROCEDURE — 84132 ASSAY OF SERUM POTASSIUM: CPT | Performed by: FAMILY MEDICINE

## 2017-10-13 PROCEDURE — 80061 LIPID PANEL: CPT | Performed by: FAMILY MEDICINE

## 2017-10-13 PROCEDURE — 82043 UR ALBUMIN QUANTITATIVE: CPT | Performed by: FAMILY MEDICINE

## 2017-10-13 PROCEDURE — 36415 COLL VENOUS BLD VENIPUNCTURE: CPT | Performed by: FAMILY MEDICINE

## 2017-10-14 PROBLEM — R31.29 MICROSCOPIC HEMATURIA: Status: ACTIVE | Noted: 2017-10-14

## 2017-10-24 ENCOUNTER — HOSPITAL ENCOUNTER (OUTPATIENT)
Dept: MAMMOGRAPHY | Facility: CLINIC | Age: 67
Discharge: HOME | End: 2017-10-24
Attending: FAMILY MEDICINE | Admitting: FAMILY MEDICINE
Payer: MEDICARE

## 2017-10-24 DIAGNOSIS — Z12.31 VISIT FOR SCREENING MAMMOGRAM: ICD-10-CM

## 2017-10-24 PROCEDURE — 77063 BREAST TOMOSYNTHESIS BI: CPT

## 2017-10-29 PROCEDURE — G0328 FECAL BLOOD SCRN IMMUNOASSAY: HCPCS | Performed by: FAMILY MEDICINE

## 2017-10-31 DIAGNOSIS — Z12.11 SCREEN FOR COLON CANCER: ICD-10-CM

## 2017-10-31 DIAGNOSIS — Z00.00 ENCOUNTER FOR ROUTINE ADULT HEALTH EXAMINATION WITHOUT ABNORMAL FINDINGS: ICD-10-CM

## 2017-10-31 LAB — HEMOCCULT STL QL IA: NEGATIVE

## 2017-11-20 ENCOUNTER — TELEPHONE (OUTPATIENT)
Dept: FAMILY MEDICINE | Facility: CLINIC | Age: 67
End: 2017-11-20

## 2017-11-20 ENCOUNTER — OFFICE VISIT (OUTPATIENT)
Dept: FAMILY MEDICINE | Facility: CLINIC | Age: 67
End: 2017-11-20
Payer: COMMERCIAL

## 2017-11-20 VITALS
SYSTOLIC BLOOD PRESSURE: 132 MMHG | DIASTOLIC BLOOD PRESSURE: 84 MMHG | TEMPERATURE: 99.9 F | WEIGHT: 163 LBS | BODY MASS INDEX: 32 KG/M2 | HEART RATE: 131 BPM | OXYGEN SATURATION: 98 % | HEIGHT: 60 IN

## 2017-11-20 DIAGNOSIS — R10.9 LEFT SIDED ABDOMINAL PAIN: Primary | ICD-10-CM

## 2017-11-20 DIAGNOSIS — K51.30 ULCERATIVE RECTOSIGMOIDITIS WITHOUT COMPLICATION (H): ICD-10-CM

## 2017-11-20 LAB
BASOPHILS # BLD AUTO: 0 10E9/L (ref 0–0.2)
BASOPHILS NFR BLD AUTO: 0.2 %
DIFFERENTIAL METHOD BLD: ABNORMAL
EOSINOPHIL # BLD AUTO: 0.3 10E9/L (ref 0–0.7)
EOSINOPHIL NFR BLD AUTO: 1.6 %
ERYTHROCYTE [DISTWIDTH] IN BLOOD BY AUTOMATED COUNT: 13.7 % (ref 10–15)
ERYTHROCYTE [SEDIMENTATION RATE] IN BLOOD BY WESTERGREN METHOD: 27 MM/H (ref 0–30)
HCT VFR BLD AUTO: 39.7 % (ref 35–47)
HGB BLD-MCNC: 13.1 G/DL (ref 11.7–15.7)
LYMPHOCYTES # BLD AUTO: 1.6 10E9/L (ref 0.8–5.3)
LYMPHOCYTES NFR BLD AUTO: 9.7 %
MCH RBC QN AUTO: 28.2 PG (ref 26.5–33)
MCHC RBC AUTO-ENTMCNC: 33 G/DL (ref 31.5–36.5)
MCV RBC AUTO: 85 FL (ref 78–100)
MONOCYTES # BLD AUTO: 1.3 10E9/L (ref 0–1.3)
MONOCYTES NFR BLD AUTO: 8 %
NEUTROPHILS # BLD AUTO: 13.4 10E9/L (ref 1.6–8.3)
NEUTROPHILS NFR BLD AUTO: 80.5 %
PLATELET # BLD AUTO: 319 10E9/L (ref 150–450)
RBC # BLD AUTO: 4.65 10E12/L (ref 3.8–5.2)
WBC # BLD AUTO: 16.6 10E9/L (ref 4–11)

## 2017-11-20 PROCEDURE — 85025 COMPLETE CBC W/AUTO DIFF WBC: CPT | Performed by: FAMILY MEDICINE

## 2017-11-20 PROCEDURE — 80053 COMPREHEN METABOLIC PANEL: CPT | Performed by: FAMILY MEDICINE

## 2017-11-20 PROCEDURE — 86140 C-REACTIVE PROTEIN: CPT | Performed by: FAMILY MEDICINE

## 2017-11-20 PROCEDURE — 36415 COLL VENOUS BLD VENIPUNCTURE: CPT | Performed by: FAMILY MEDICINE

## 2017-11-20 PROCEDURE — 99213 OFFICE O/P EST LOW 20 MIN: CPT | Performed by: FAMILY MEDICINE

## 2017-11-20 PROCEDURE — 85652 RBC SED RATE AUTOMATED: CPT | Performed by: FAMILY MEDICINE

## 2017-11-20 NOTE — TELEPHONE ENCOUNTER
ABDOMINAL and flank   PAIN     Onset: Saturday night    Description:   Character: Dull ache  Location: left lower quadrant  Radiation Back    Intensity: 5/10    Progression of Symptoms:  waxing and waning    Accompanying Signs & Symptoms:  Fever/Chills?: no   Gas/Bloating: no   Nausea: no   Vomitting: no   Diarrhea?: no   Constipation:no   Dysuria or Hematuria: no    History:   Trauma: no   Previous similar pain: no  Previous tests done: none    Precipitating factors:   Does the pain change with:     Food: no      BM: no     Urination: no     Alleviating factors:  Just hurts when bending forward    Therapies Tried and outcome: none    LMP:  not applicable      Message handled by Nurse Triage with [facility] - provider name: MELINA LEE     Appt made    Becky Wilson RN    [facility] Triage  .

## 2017-11-20 NOTE — MR AVS SNAPSHOT
After Visit Summary   11/20/2017    Jael Car    MRN: [id number]           Patient Information     Date Of Birth          1950        Visit Information        Provider Department      11/20/2017 4:40 PM Abdifatah August,  [facility]        Today's Diagnoses     Left sided abdominal pain    -  1    Ulcerative rectosigmoiditis 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 (H)          Care Instructions    Please call [facility] about current symptoms -- concern about new flare of ulcerative colitis.          Follow-ups after your visit        Who to contact     If you have questions or need follow up information about today's clinic visit or your schedule please contact [facility] directly at [phone].  Normal or non-critical lab and imaging results will be communicated to you by [employer], letter or phone within 4 business days after the clinic has received the results. If you do not hear from us within 7 days, please contact the clinic through [employer] or phone. If you have a critical or abnormal lab result, we will notify you by phone as soon as possible.  Submit refill requests through [employer] or call your pharmacy and they will forward the refill request to us. Please allow 3 business days for your refill to be completed.          Additional Information About Your Visit        MyChart Information     [employer] gives you secure access to your electronic health record. If you see a primary care provider, you can also send messages to your care team and make appointments. If you have questions, please call your primary care clinic.  If you do not have a primary care provider, please call [phone] and they will assist you.        Care EveryWhere ID     This is your Care EveryWhere ID. This could be used by other organizations to access your [facility] medical records  [id number]        Your Vitals Were     Pulse Temperature Height Last Period Pulse Oximetry BMI (Body Mass Index)    131 99.9  F  "(37.7  C) (Oral) 4' 11.5\" (1.511 m) 06/03/2004 98% 32.37 kg/m2       Blood Pressure from Last 3 Encounters:   11/20/17 132/84   05/13/17 134/76   11/25/16 138/84    Weight from Last 3 Encounters:   11/20/17 163 lb (73.9 kg)   05/13/17 152 lb (68.9 kg)   11/25/16 160 lb (72.6 kg)              We Performed the Following     CBC with platelets differential     Comprehensive metabolic panel     CRP, inflammation     ESR: Erythrocyte sedimentation rate        Primary Care Provider Office Phone # Fax #    Ken Gardiner [phone] [address] [facility] [address]        Equal Access to Services     [facility] : Hadii aad ku hadasho Soomaali, waaxda luqadaha, qaybta kaalmada adeegyada, jason roberts . So [facility] [phone].    ATENCIÓN: Si habla español, tiene a gary disposición servicios gratuitos de asistencia lingüística. [facility] [phone].    We comply with applicable federal civil rights laws and [place] laws. We do not discriminate on the basis of race, color, national origin, age, disability, sex, sexual orientation, or gender identity.            Thank you!     [facility] you [facility]  [facility]. Our goal is always to provide you with excellent care. Hearing back from our patients is one way we can continue to improve our services. Please take a few minutes to complete the written survey that you may receive in the mail after your visit with us. Thank you!             Your Updated Medication List - Protect others around you: Learn how to safely use, store and throw away your medicines at www.disposemymeds.org.          This list is accurate as of: 11/20/17  5:26 PM.  Always use your most recent med list.                   Brand Name Dispense Instructions for use Diagnosis    aspirin EC 81 MG EC tablet      Take 81 mg by mouth daily.        balsalazide 750 MG capsule    COLAZAL     Take 4 capsules (3,000 mg) by mouth 2 times " daily        CALCIUM GUMMIES 250-100-500 MG-MG-UNIT Chew   Generic drug:  Calcium-Phosphorus-Vitamin D      Take by mouth 2 times daily        * cholecalciferol 5000 UNITS Caps capsule    vitamin D3     Take by mouth every other day        * VITAJOY DAILY D GUMMIES 1000 UNITS Chew   Generic drug:  cholecalciferol     30 tablet         multivitamin  peds with iron 60 MG chewable tablet      Take 1 chew tab by mouth daily.        predniSONE 20 MG tablet    DELTASONE    20 tablet    Take 60 mg daily for 3 days, then 40mg daily for 3 days, then 20 mg daily for 3 days, then 10 mg for 4 days        simvastatin 40 MG tablet    ZOCOR    90 tablet    Take 1 tablet (40 mg) by mouth At Bedtime    Hyperlipidemia LDL goal <130       * Notice:  This list has 2 medication(s) that are the same as other medications prescribed for you. Read the directions carefully, and ask your doctor or other care provider to review them with you.

## 2017-11-20 NOTE — NURSING NOTE
"Chief Complaint   Patient presents with     Abdominal Pain       Initial /84 (BP Location: Right arm, Patient Position: Sitting, Cuff Size: Adult Regular)  Pulse 131  Temp 99.9  F (37.7  C) (Oral)  Ht 4' 11.5\" (1.511 m)  Wt 163 lb (73.9 kg)  LMP 06/03/2004  SpO2 98%  BMI 32.37 kg/m2 Estimated body mass index is 32.37 kg/(m^2) as calculated from the following:    Height as of this encounter: 4' 11.5\" (1.511 m).    Weight as of this encounter: 163 lb (73.9 kg).  Medication Reconciliation: complete   Arabella Moon MA    "

## 2017-11-20 NOTE — PROGRESS NOTES
SUBJECTIVE:   Jael Car is a 67 year old female who presents to clinic today for the following health issues:    ABDOMINAL and flank   PAIN     Onset: x 3 days    Description:   Character: Dull ache  Location: left lower quadrant  Radiation Back    Intensity: 5/10    Progression of Symptoms:  waxing and waning    Accompanying Signs & Symptoms:  Fever/Chills?: no   Gas/Bloating: no   Nausea: no   Vomitting: no   Diarrhea?: no   Constipation:no   Dysuria or Hematuria: no    History:   Trauma: no   Previous similar pain: no  Previous tests done: none    Precipitating factors:   Does the pain change with:     Food: no      BM: no     Urination: no     Alleviating factors:  Just hurts when bending forward    Therapies Tried and outcome: none    LMP:  not applicable       Jael has history of ulcerative colitis. She states for the past few days she has been having some low back pain and some left lower quadrant abdominal pain. The pain does seem to improve throughout the day but never really goes away. Lying on her left side makes the pain worse. She had a partial colonoscopy with [facility] and was put on a steroid taper and started on balsalazide in early October. She denies any fevers, chills, nausea, or vomiting. No diarrhea or constipation. No hematochezia or melena.      Problem list and histories reviewed & adjusted, as indicated.  Additional history: as documented    Patient Active Problem List   Diagnosis     Chondromalacia of patella     Hyperlipidemia LDL goal <130     Lumbago     Pulmonary embolism and infarction (H)     S/P shoulder surgery     Advanced directives, counseling/discussion     DVT (deep venous thrombosis) (H)     Rotator cuff (capsule) sprain     Osteopenia     Carpal tunnel syndrome     Hyperglycemia     Subclinical hyperthyroidism     Osteoarthrosis of knee     Obesity     History of pulmonary embolism     Primary osteoarthritis of right knee     MTHFR mutation (H)     History of deep venous  thrombosis     Elevated serum protein level     Ulcerative rectosigmoiditis 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 (H)     Microscopic hematuria     Past Surgical History:   Procedure Laterality Date     COLONOSCOPY  09, 9/15    colitis, due 5 yrs     SIGMOIDOSCOPY FLEXIBLE  09/2017    rectosigmoiditis     SURGICAL HISTORY OF -  Right 2011    Rt Rotator Cuff       Social History   Substance Use Topics     Smoking status: Never Smoker     Smokeless tobacco: Never Used      Comment: 30 years ago     Alcohol use 1.2 - 2.4 oz/week     2 - 4 Standard drinks or equivalent per week      Comment: 1-2 drinks about 2 times/week     Family History   Problem Relation Age of Onset     Alcohol/Drug Father      Lipids Father      high cholesterol     DIABETES Paternal Grandmother      DIABETES Son      diet controlled     Lung Cancer Maternal Grandmother      smoker         Current Outpatient Prescriptions   Medication Sig Dispense Refill     balsalazide (COLAZAL) 750 MG capsule Take 4 capsules (3,000 mg) by mouth 2 times daily       simvastatin (ZOCOR) 40 MG tablet Take 1 tablet (40 mg) by mouth At Bedtime 90 tablet 3     cholecalciferol (VITAJOY DAILY D GUMMIES) 1000 UNITS CHEW  30 tablet      Calcium-Phosphorus-Vitamin D (CALCIUM GUMMIES) 250-100-500 MG-MG-UNIT CHEW Take by mouth 2 times daily       cholecalciferol (VITAMIN D3) 5000 UNITS CAPS capsule Take by mouth every other day       multivitamin  peds with iron (FLINTSTONES COMPLETE) 60 MG chewable tablet Take 1 chew tab by mouth daily.       aspirin EC 81 MG tablet Take 81 mg by mouth daily.       predniSONE (DELTASONE) 20 MG tablet Take 60 mg daily for 3 days, then 40mg daily for 3 days, then 20 mg daily for 3 days, then 10 mg for 4 days 20 tablet 0     No Known Allergies      Reviewed and updated as needed this visit by clinical staff  Tobacco  Allergies  Meds  Problems       Reviewed and updated as needed this visit by Provider  Allergies  Meds  Problems      "    ROS:  Constitutional, HEENT, cardiovascular, pulmonary, gi and gu systems are negative, except as otherwise noted.      OBJECTIVE:   /84 (BP Location: Right arm, Patient Position: Sitting, Cuff Size: Adult Regular)  Pulse 131  Temp 99.9  F (37.7  C) (Oral)  Ht 4' 11.5\" (1.511 m)  Wt 163 lb (73.9 kg)  LMP 06/03/2004  SpO2 98%  BMI 32.37 kg/m2  Body mass index is 32.37 kg/(m^2).  GENERAL: healthy, alert and no distress  RESP: lungs clear to auscultation - no rales, rhonchi or wheezes  CV: regular rate and rhythm, normal S1 S2, no S3 or S4, no murmur, click or rub, no peripheral edema and peripheral pulses strong  ABDOMEN: tenderness LLQ and bowel sounds normal, soft, non-distended, no rigidity, rebound, or guarding.  MS: no gross musculoskeletal defects noted, no edema      ASSESSMENT/PLAN:   1. Left sided abdominal pain: unclear etiology, however, with history of ulcerative colitis, concern for possible flare. Will check metabolic panel, CBC, and inflammatory markers. Discussed touching base with [facility] to see if they would like her to be seen, if antibiotics, antiinflammatories, or other medication would be recommended. Will try to touch base with her gastroenterologist as well.  - Comprehensive metabolic panel  - CRP, inflammation  - ESR: Erythrocyte sedimentation rate  - CBC with platelets differential    2. Ulcerative rectosigmoiditis 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 (H): managed by [facility], currently taking balsalazide. Concern for flare.    Abdifatah August,   [facility]  "

## 2017-11-21 LAB
ALBUMIN SERPL-MCNC: 3.4 G/DL (ref 3.4–5)
ALP SERPL-CCNC: 122 U/L (ref 40–150)
ALT SERPL W P-5'-P-CCNC: 17 U/L (ref 0–50)
ANION GAP SERPL CALCULATED.3IONS-SCNC: 8 MMOL/L (ref 3–14)
AST SERPL W P-5'-P-CCNC: 19 U/L (ref 0–45)
BILIRUB SERPL-MCNC: 0.3 MG/DL (ref 0.2–1.3)
BUN SERPL-MCNC: 14 MG/DL (ref 7–30)
CALCIUM SERPL-MCNC: 9.2 MG/DL (ref 8.5–10.1)
CHLORIDE SERPL-SCNC: 105 MMOL/L (ref 94–109)
CO2 SERPL-SCNC: 28 MMOL/L (ref 20–32)
CREAT SERPL-MCNC: 0.71 MG/DL (ref 0.52–1.04)
CRP SERPL-MCNC: 82 MG/L (ref 0–8)
GFR SERPL CREATININE-BSD FRML MDRD: 83 ML/MIN/1.7M2
GLUCOSE SERPL-MCNC: 104 MG/DL (ref 70–99)
POTASSIUM SERPL-SCNC: 3.9 MMOL/L (ref 3.4–5.3)
PROT SERPL-MCNC: 7.6 G/DL (ref 6.8–8.8)
SODIUM SERPL-SCNC: 141 MMOL/L (ref 133–144)

## 2017-11-24 ENCOUNTER — MYC MEDICAL ADVICE (OUTPATIENT)
Dept: FAMILY MEDICINE | Facility: CLINIC | Age: 67
End: 2017-11-24

## 2017-11-24 DIAGNOSIS — R10.9 LEFT SIDED ABDOMINAL PAIN: Primary | ICD-10-CM

## 2017-11-24 NOTE — TELEPHONE ENCOUNTER
Orders pended for approval    Routing to Dr. August for further review/recommendations/orders.    Licha Cuevas RN  [facility]

## 2017-11-29 DIAGNOSIS — R10.9 LEFT SIDED ABDOMINAL PAIN: ICD-10-CM

## 2017-11-29 LAB
ALBUMIN SERPL-MCNC: 3.2 G/DL (ref 3.4–5)
ALP SERPL-CCNC: 122 U/L (ref 40–150)
ALT SERPL W P-5'-P-CCNC: 18 U/L (ref 0–50)
ANION GAP SERPL CALCULATED.3IONS-SCNC: 9 MMOL/L (ref 3–14)
AST SERPL W P-5'-P-CCNC: 14 U/L (ref 0–45)
BILIRUB SERPL-MCNC: 0.2 MG/DL (ref 0.2–1.3)
BUN SERPL-MCNC: 12 MG/DL (ref 7–30)
CALCIUM SERPL-MCNC: 9.2 MG/DL (ref 8.5–10.1)
CHLORIDE SERPL-SCNC: 107 MMOL/L (ref 94–109)
CO2 SERPL-SCNC: 29 MMOL/L (ref 20–32)
CREAT SERPL-MCNC: 0.7 MG/DL (ref 0.52–1.04)
CRP SERPL-MCNC: 13 MG/L (ref 0–8)
ERYTHROCYTE [DISTWIDTH] IN BLOOD BY AUTOMATED COUNT: 12.9 % (ref 10–15)
ERYTHROCYTE [SEDIMENTATION RATE] IN BLOOD BY WESTERGREN METHOD: 33 MM/H (ref 0–30)
GFR SERPL CREATININE-BSD FRML MDRD: 84 ML/MIN/1.7M2
GLUCOSE SERPL-MCNC: 100 MG/DL (ref 70–99)
HCT VFR BLD AUTO: 38.5 % (ref 35–47)
HGB BLD-MCNC: 12.6 G/DL (ref 11.7–15.7)
MCH RBC QN AUTO: 27.8 PG (ref 26.5–33)
MCHC RBC AUTO-ENTMCNC: 32.7 G/DL (ref 31.5–36.5)
MCV RBC AUTO: 85 FL (ref 78–100)
PLATELET # BLD AUTO: 443 10E9/L (ref 150–450)
POTASSIUM SERPL-SCNC: 3.6 MMOL/L (ref 3.4–5.3)
PROT SERPL-MCNC: 7.4 G/DL (ref 6.8–8.8)
RBC # BLD AUTO: 4.53 10E12/L (ref 3.8–5.2)
SODIUM SERPL-SCNC: 145 MMOL/L (ref 133–144)
WBC # BLD AUTO: 8.5 10E9/L (ref 4–11)

## 2017-11-29 PROCEDURE — 80053 COMPREHEN METABOLIC PANEL: CPT | Performed by: FAMILY MEDICINE

## 2017-11-29 PROCEDURE — 85652 RBC SED RATE AUTOMATED: CPT | Performed by: FAMILY MEDICINE

## 2017-11-29 PROCEDURE — 86140 C-REACTIVE PROTEIN: CPT | Performed by: FAMILY MEDICINE

## 2017-11-29 PROCEDURE — 36415 COLL VENOUS BLD VENIPUNCTURE: CPT | Performed by: FAMILY MEDICINE

## 2017-11-29 PROCEDURE — 85027 COMPLETE CBC AUTOMATED: CPT | Performed by: FAMILY MEDICINE

## 2017-12-14 ENCOUNTER — TRANSFERRED RECORDS (OUTPATIENT)
Dept: HEALTH INFORMATION MANAGEMENT | Facility: CLINIC | Age: 67
End: 2017-12-14

## 2017-12-15 PROBLEM — K63.5 COLON POLYPS: Status: ACTIVE | Noted: 2017-12-01

## 2018-01-15 ENCOUNTER — TRANSFERRED RECORDS (OUTPATIENT)
Dept: HEALTH INFORMATION MANAGEMENT | Facility: CLINIC | Age: 68
End: 2018-01-15

## 2018-05-08 ENCOUNTER — TRANSFERRED RECORDS (OUTPATIENT)
Dept: HEALTH INFORMATION MANAGEMENT | Facility: CLINIC | Age: 68
End: 2018-05-08

## 2018-10-20 ENCOUNTER — HEALTH MAINTENANCE LETTER (OUTPATIENT)
Age: 68
End: 2018-10-20

## 2018-10-26 ENCOUNTER — HOSPITAL ENCOUNTER (OUTPATIENT)
Dept: MAMMOGRAPHY | Facility: CLINIC | Age: 68
Discharge: HOME | End: 2018-10-26
Attending: FAMILY MEDICINE | Admitting: FAMILY MEDICINE
Payer: MEDICARE

## 2018-10-26 DIAGNOSIS — Z12.31 VISIT FOR SCREENING MAMMOGRAM: ICD-10-CM

## 2018-10-26 PROCEDURE — 77067 SCR MAMMO BI INCL CAD: CPT

## 2018-10-28 ASSESSMENT — ACTIVITIES OF DAILY LIVING (ADL)
I_NEED_ASSISTANCE_FOR_THE_FOLLOWING_DAILY_ACTIVITIES:: NO ASSISTANCE IS NEEDED
CURRENT_FUNCTION: NO ASSISTANCE NEEDED

## 2018-10-31 ENCOUNTER — OFFICE VISIT (OUTPATIENT)
Dept: FAMILY MEDICINE | Facility: CLINIC | Age: 68
End: 2018-10-31
Payer: COMMERCIAL

## 2018-10-31 VITALS
WEIGHT: 165 LBS | HEIGHT: 60 IN | BODY MASS INDEX: 32.39 KG/M2 | TEMPERATURE: 99 F | OXYGEN SATURATION: 98 % | DIASTOLIC BLOOD PRESSURE: 92 MMHG | SYSTOLIC BLOOD PRESSURE: 154 MMHG | HEART RATE: 107 BPM

## 2018-10-31 DIAGNOSIS — E78.5 HYPERLIPIDEMIA LDL GOAL <130: ICD-10-CM

## 2018-10-31 DIAGNOSIS — Z00.00 ENCOUNTER FOR ROUTINE ADULT HEALTH EXAMINATION WITHOUT ABNORMAL FINDINGS: ICD-10-CM

## 2018-10-31 DIAGNOSIS — M85.89 OSTEOPENIA OF MULTIPLE SITES: ICD-10-CM

## 2018-10-31 DIAGNOSIS — K63.5 POLYP OF COLON, UNSPECIFIED PART OF COLON, UNSPECIFIED TYPE: ICD-10-CM

## 2018-10-31 DIAGNOSIS — Z86.718 HISTORY OF DEEP VENOUS THROMBOSIS: ICD-10-CM

## 2018-10-31 DIAGNOSIS — M15.0 PRIMARY OSTEOARTHRITIS INVOLVING MULTIPLE JOINTS: ICD-10-CM

## 2018-10-31 DIAGNOSIS — R82.90 NONSPECIFIC FINDING ON EXAMINATION OF URINE: ICD-10-CM

## 2018-10-31 DIAGNOSIS — Z51.81 MEDICATION MONITORING ENCOUNTER: ICD-10-CM

## 2018-10-31 DIAGNOSIS — R03.0 ELEVATED BLOOD PRESSURE READING WITHOUT DIAGNOSIS OF HYPERTENSION: ICD-10-CM

## 2018-10-31 DIAGNOSIS — Z12.11 SCREEN FOR COLON CANCER: ICD-10-CM

## 2018-10-31 DIAGNOSIS — E66.811 CLASS 1 OBESITY WITH SERIOUS COMORBIDITY AND BODY MASS INDEX (BMI) OF 32.0 TO 32.9 IN ADULT, UNSPECIFIED OBESITY TYPE: ICD-10-CM

## 2018-10-31 DIAGNOSIS — Z23 NEED FOR PROPHYLACTIC VACCINATION AGAINST STREPTOCOCCUS PNEUMONIAE (PNEUMOCOCCUS): ICD-10-CM

## 2018-10-31 DIAGNOSIS — Z86.711 HISTORY OF PULMONARY EMBOLISM: ICD-10-CM

## 2018-10-31 DIAGNOSIS — K51.30 ULCERATIVE RECTOSIGMOIDITIS WITHOUT COMPLICATION (H): ICD-10-CM

## 2018-10-31 DIAGNOSIS — Z12.73 SCREENING FOR OVARIAN CANCER: ICD-10-CM

## 2018-10-31 DIAGNOSIS — Z23 NEED FOR PROPHYLACTIC VACCINATION AND INOCULATION AGAINST INFLUENZA: ICD-10-CM

## 2018-10-31 DIAGNOSIS — E05.90 SUBCLINICAL HYPERTHYROIDISM: ICD-10-CM

## 2018-10-31 DIAGNOSIS — Z15.89 MTHFR MUTATION: ICD-10-CM

## 2018-10-31 DIAGNOSIS — Z00.00 ENCOUNTER FOR MEDICARE ANNUAL WELLNESS EXAM: Primary | ICD-10-CM

## 2018-10-31 LAB
ALBUMIN UR-MCNC: ABNORMAL MG/DL
APPEARANCE UR: CLEAR
BACTERIA #/AREA URNS HPF: ABNORMAL /HPF
BILIRUB UR QL STRIP: NEGATIVE
COLOR UR AUTO: YELLOW
CRP SERPL-MCNC: 9.2 MG/L (ref 0–8)
ERYTHROCYTE [DISTWIDTH] IN BLOOD BY AUTOMATED COUNT: 13.5 % (ref 10–15)
ERYTHROCYTE [SEDIMENTATION RATE] IN BLOOD BY WESTERGREN METHOD: 20 MM/H (ref 0–30)
GLUCOSE UR STRIP-MCNC: NEGATIVE MG/DL
HBA1C MFR BLD: 5.7 % (ref 0–5.6)
HCT VFR BLD AUTO: 42.1 % (ref 35–47)
HGB BLD-MCNC: 13.6 G/DL (ref 11.7–15.7)
HGB UR QL STRIP: ABNORMAL
KETONES UR STRIP-MCNC: NEGATIVE MG/DL
LEUKOCYTE ESTERASE UR QL STRIP: ABNORMAL
MCH RBC QN AUTO: 27.2 PG (ref 26.5–33)
MCHC RBC AUTO-ENTMCNC: 32.3 G/DL (ref 31.5–36.5)
MCV RBC AUTO: 84 FL (ref 78–100)
MUCOUS THREADS #/AREA URNS LPF: PRESENT /LPF
NITRATE UR QL: NEGATIVE
NON-SQ EPI CELLS #/AREA URNS LPF: ABNORMAL /LPF
PH UR STRIP: 7 PH (ref 5–7)
PLATELET # BLD AUTO: 311 10E9/L (ref 150–450)
RBC # BLD AUTO: 5 10E12/L (ref 3.8–5.2)
RBC #/AREA URNS AUTO: ABNORMAL /HPF
SOURCE: ABNORMAL
SP GR UR STRIP: 1.02 (ref 1–1.03)
UROBILINOGEN UR STRIP-ACNC: 0.2 EU/DL (ref 0.2–1)
WBC # BLD AUTO: 6.3 10E9/L (ref 4–11)
WBC #/AREA URNS AUTO: ABNORMAL /HPF

## 2018-10-31 PROCEDURE — 82043 UR ALBUMIN QUANTITATIVE: CPT | Performed by: FAMILY MEDICINE

## 2018-10-31 PROCEDURE — 85027 COMPLETE CBC AUTOMATED: CPT | Performed by: FAMILY MEDICINE

## 2018-10-31 PROCEDURE — 84443 ASSAY THYROID STIM HORMONE: CPT | Performed by: FAMILY MEDICINE

## 2018-10-31 PROCEDURE — 86140 C-REACTIVE PROTEIN: CPT | Performed by: FAMILY MEDICINE

## 2018-10-31 PROCEDURE — 83036 HEMOGLOBIN GLYCOSYLATED A1C: CPT | Performed by: FAMILY MEDICINE

## 2018-10-31 PROCEDURE — 87086 URINE CULTURE/COLONY COUNT: CPT | Performed by: FAMILY MEDICINE

## 2018-10-31 PROCEDURE — 85652 RBC SED RATE AUTOMATED: CPT | Performed by: FAMILY MEDICINE

## 2018-10-31 PROCEDURE — 81001 URINALYSIS AUTO W/SCOPE: CPT | Performed by: FAMILY MEDICINE

## 2018-10-31 PROCEDURE — 80053 COMPREHEN METABOLIC PANEL: CPT | Performed by: FAMILY MEDICINE

## 2018-10-31 PROCEDURE — 36415 COLL VENOUS BLD VENIPUNCTURE: CPT | Performed by: FAMILY MEDICINE

## 2018-10-31 PROCEDURE — G0439 PPPS, SUBSEQ VISIT: HCPCS | Performed by: FAMILY MEDICINE

## 2018-10-31 PROCEDURE — 84439 ASSAY OF FREE THYROXINE: CPT | Performed by: FAMILY MEDICINE

## 2018-10-31 PROCEDURE — 82306 VITAMIN D 25 HYDROXY: CPT | Performed by: FAMILY MEDICINE

## 2018-10-31 PROCEDURE — 80061 LIPID PANEL: CPT | Performed by: FAMILY MEDICINE

## 2018-10-31 PROCEDURE — 82550 ASSAY OF CK (CPK): CPT | Performed by: FAMILY MEDICINE

## 2018-10-31 RX ORDER — SIMVASTATIN 40 MG
40 TABLET ORAL AT BEDTIME
Qty: 90 TABLET | Refills: 3 | Status: CANCELLED | OUTPATIENT
Start: 2018-10-31

## 2018-10-31 RX ORDER — ATORVASTATIN CALCIUM 20 MG/1
20 TABLET, FILM COATED ORAL DAILY
Qty: 90 TABLET | Refills: 3 | Status: SHIPPED | OUTPATIENT
Start: 2018-10-31 | End: 2019-10-14

## 2018-10-31 ASSESSMENT — ACTIVITIES OF DAILY LIVING (ADL): CURRENT_FUNCTION: NO ASSISTANCE NEEDED

## 2018-10-31 NOTE — PROGRESS NOTES
SUBJECTIVE:   Jael Car is a 68 year old female who presents for Preventive Visit.    Are you in the first 12 months of your Medicare coverage?  No    Pt stated she wants a Medicare annual wellness visit - pt advised not all regular physical components are covered under just a medicare wellness visit.  Pt stated that her [employer] plan picks up anything that isn't covered by medicare, reviewed in detail with patient.    Annual Wellness Visit     Frequency of exercise:  2-3 days/week    Taking medications regularly:  No    Medication side effects:  None    Ability to successfully perform activities of daily living:  No assistance needed    Home Safety:  No safety concerns identified    Hearing Impairment:  No hearing concerns   In general, how would you rate your overall mental or emotional health?     PHQ-2 Total Score: 0    Additional concerns today:  YES  (pt advised additional charges may apply for additional concerns - pt declined the form from  that also explained additional concern charges)    Colitis issues: Patient is currently prescribed 3000 mg Balsalazide BID for ulcerative colitis management. Patient has GI appointment scheduled for 11/6/2018 fur further ulcerative colitis treatment.    Hyperlipidemia: Patient is currently prescribed 40 mg Simvastatin daily for hyperlipidemia management.    Recent Labs   Lab Test  10/13/17   0856  10/04/17   1034   08/14/15   0913  07/30/14   0828   CHOL  184  180   < >  156  161   HDL  60  65   < >  55  47*   LDL  92  73   < >  78  87   TRIG  160*  208*   < >  116  137   CHOLHDLRATIO   --    --    --   2.8  3.4    < > = values in this interval not displayed.     Hx of DVT: Stable. Patient has MTHFR mutation which increases likelihood of blood clots.    Osteoarthritis: Patient reports no acute issues.    Osteoporosis: No acute issues. Patient currently takes vitamin D and Calcium supplements for osteoporosis management.    Fall risk:  Fallen 2 or more times in  the past year?: No  Any fall with injury in the past year?: No    COGNITIVE SCREEN  1) Repeat 3 items (Leader, Season, Table)    2) Clock draw: NORMAL  3) 3 item recall: Recalls 3 objects  Results: 3 items recalled: COGNITIVE IMPAIRMENT LESS LIKELY    Mini-CogTM Copyright S Mario. Licensed by the author for use in [facility]; reprinted with permission ([email or web address]). All rights reserved.        Reviewed and updated as needed this visit by clinical staff  Tobacco  Allergies  Meds  Med Hx  Surg Hx  Fam Hx  Soc Hx        Reviewed and updated as needed this visit by Provider  Allergies        Social History   Substance Use Topics     Smoking status: Never Smoker     Smokeless tobacco: Never Used      Comment: 30 years ago     Alcohol use 1.2 - 2.4 oz/week     2 - 4 Standard drinks or equivalent per week      Comment: 1-2 drinks about 2 times/week       Alcohol Use 10/28/2018   If you drink alcohol do you typically have greater than 3 drinks per day OR greater than 7 drinks per week? No       Do you feel safe in your environment - Yes    Do you have a Health Care Directive?: Yes: Patient states has Advance Directive and will bring in a copy to clinic.    Current providers sharing in care for this patient include:   Patient Care Team:  Ken Gardiner MD as PCP - General (Family Practice)    The following health maintenance items are reviewed in Epic and correct as of today:  Health Maintenance   Topic Date Due     PNEUMOCOCCAL (1 of 2 - PCV13) 04/29/2016     FALL RISK ASSESSMENT  10/04/2018     FIT Q1 YR  10/29/2018     MAMMO Q1 YR  10/26/2019     DEXA Q3 YR  10/26/2019     ALT Q1 YR  10/31/2019     LIPID MONITORING Q1 YEAR  10/31/2019     PHQ-2 Q1 YR  10/31/2019     COLONOSCOPY Q5 YR  12/14/2022     ADVANCE DIRECTIVE PLANNING Q5 YRS  10/31/2023     TETANUS IMMUNIZATION (SYSTEM ASSIGNED)  04/16/2024     INFLUENZA VACCINE  Addressed     HEPATITIS C SCREENING  Completed     Labs reviewed in EPIC    Review  "of Systems  Constitutional, HEENT, cardiovascular, pulmonary, GI, , musculoskeletal, neuro, skin, endocrine and psych systems are negative, except as otherwise noted.    OBJECTIVE:   BP (!) 154/92  Pulse 107  Temp 99  F (37.2  C) (Oral)  Ht 4' 11.5\" (1.511 m)  Wt 165 lb (74.8 kg)  LMP 06/03/2004  SpO2 98%  BMI 32.77 kg/m2 Estimated body mass index is 32.77 kg/(m^2) as calculated from the following:    Height as of this encounter: 4' 11.5\" (1.511 m).    Weight as of this encounter: 165 lb (74.8 kg).  Physical Exam  GENERAL APPEARANCE: healthy, alert and no distress  EYES: Eyes grossly normal to inspection, PERRL and conjunctivae and sclerae normal  HENT: ear canals and TM's normal, nose and mouth without ulcers or lesions, oropharynx clear and oral mucous membranes moist, Bilateral Cerumen Impactions  NECK: no adenopathy, no asymmetry, masses, or scars and thyroid normal to palpation  RESP: lungs clear to auscultation - no rales, rhonchi or wheezes  CV: regular rate and rhythm, normal S1 S2, no S3 or S4, no murmur, click or rub, no peripheral edema and peripheral pulses strong  ABDOMEN: soft, nontender, no hepatosplenomegaly, no masses and bowel sounds normal  MS: no musculoskeletal defects are noted and gait is age appropriate without ataxia  SKIN: no suspicious lesions or rashes  NEURO: Normal strength and tone, sensory exam grossly normal, mentation intact and speech normal  PSYCH: mentation appears normal and affect normal/bright  BACK: no CVA tenderness, no paralumbar tenderness    Diagnostic Test Results:  Results for orders placed or performed in visit on 10/31/18 (from the past 24 hour(s))   CBC with platelets   Result Value Ref Range    WBC 6.3 4.0 - 11.0 10e9/L    RBC Count 5.00 3.8 - 5.2 10e12/L    Hemoglobin 13.6 11.7 - 15.7 g/dL    Hematocrit 42.1 35.0 - 47.0 %    MCV 84 78 - 100 fl    MCH 27.2 26.5 - 33.0 pg    MCHC 32.3 31.5 - 36.5 g/dL    RDW 13.5 10.0 - 15.0 %    Platelet Count 311 150 - 450 " 10e9/L   Hemoglobin A1c   Result Value Ref Range    Hemoglobin A1C 5.7 (H) 0 - 5.6 %   Erythrocyte sedimentation rate auto   Result Value Ref Range    Sed Rate 20 0 - 30 mm/h   CRP inflammation   Result Value Ref Range    CRP Inflammation 9.2 (H) 0.0 - 8.0 mg/L   *UA reflex to Microscopic and Culture ([place] and [facility] (except [place] and [place])   Result Value Ref Range    Color Urine Yellow     Appearance Urine Clear     Glucose Urine Negative NEG^Negative mg/dL    Bilirubin Urine Negative NEG^Negative    Ketones Urine Negative NEG^Negative mg/dL    Specific Gravity Urine 1.020 1.003 - 1.035    Blood Urine Moderate (A) NEG^Negative    pH Urine 7.0 5.0 - 7.0 pH    Protein Albumin Urine Trace (A) NEG^Negative mg/dL    Urobilinogen Urine 0.2 0.2 - 1.0 EU/dL    Nitrite Urine Negative NEG^Negative    Leukocyte Esterase Urine Trace (A) NEG^Negative    Source Midstream Urine    Urine Microscopic   Result Value Ref Range    WBC Urine 0 - 5 OTO5^0 - 5 /HPF    RBC Urine 10-25 (A) OTO2^O - 2 /HPF    Squamous Epithelial /LPF Urine Few FEW^Few /LPF    Bacteria Urine Few (A) NEG^Negative /HPF    Mucous Urine Present (A) NEG^Negative /LPF        Labs Pending    ASSESSMENT / PLAN:       ICD-10-CM    1. Encounter for Medicare annual wellness exam Z00.00 Comprehensive metabolic panel     Lipid panel reflex to direct LDL Fasting     CK total     CBC with platelets     TSH with free T4 reflex     Hemoglobin A1c     Albumin Random Urine Quantitative with Creat Ratio     *UA reflex to Microscopic and Culture ([place] and [facility] (except [facility])     T4 free     Vitamin D Deficiency     Erythrocyte sedimentation rate auto     CRP inflammation     Urine Microscopic   2. Encounter for routine adult health examination without abnormal findings Z00.00 Comprehensive metabolic panel     Lipid panel reflex to direct LDL Fasting     CK total     CBC with platelets     TSH with free T4 reflex     Hemoglobin  A1c     Albumin Random Urine Quantitative with Creat Ratio     *UA reflex to Microscopic and Culture ([place] and [facility] (except [place] and [place])     T4 free     Vitamin D Deficiency     Erythrocyte sedimentation rate auto     CRP inflammation   3. MTHFR mutation (H) E72.12    4. History of pulmonary embolism Z86.711    5. History of deep venous thrombosis Z86.718    6. Hyperlipidemia LDL goal <130 E78.5 Comprehensive metabolic panel     Lipid panel reflex to direct LDL Fasting     CK total     atorvastatin (LIPITOR) 20 MG tablet     Comprehensive metabolic panel     Lipid panel reflex to direct LDL Fasting     CK total   7. Subclinical hyperthyroidism E05.90 TSH with free T4 reflex     T4 free   8. Osteopenia of multiple sites M85.89 Comprehensive metabolic panel     Vitamin D Deficiency   9. Primary osteoarthritis involving multiple joints M15.0    10. 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 Elevated blood pressure reading without diagnosis of hypertension R03.0    12. Class 1 obesity with serious comorbidity and body mass index (BMI) of 32.0 to 32.9 in adult, unspecified obesity type E66.9     Z68.32    13. Polyp of colon, unspecified part of colon, unspecified type K63.5    14. Screen for colon cancer Z12.11    15. Screening for ovarian cancer Z12.73    16. Medication monitoring encounter Z51.81    17. Need for prophylactic vaccination and inoculation against influenza Z23    18. Need for prophylactic vaccination against Streptococcus pneumoniae (pneumococcus) Z23    19. Nonspecific finding on examination of urine R82.90 Urine Culture Aerobic Bacterial     Discussed treatment/modality options, including risk and benefits, she desires advised aspirin 81 mg po daily, advised 1 multivitamin per day, advised calcium 0794-9173 mg/d and Vitamin D 800-1200 IU/d,  "advised dentist every 6 months, advised diet and exercise and advised opthalmologist every 1-2 years. All diagnosis above reviewed and noted above, otherwise stable.  See [facility] orders for further details.      1) Patient is currently prescribed 3000 mg Balsalazide BID for ulcerative colitis management. Advised continued use. Patient has GI appointment scheduled for 11/6/2018 fur further ulcerative colitis treatment.    2) Patient is currently prescribed 40 mg Simvastatin daily for hyperlipidemia management. Patient advised to discontinue Simvastatin. Patient prescribed 20 mg Atorvastatin daily for hyperlipidemia management today. Follow up for fasting labs in 2-3 months.    3) Patient currently takes vitamin D and Calcium supplements for osteoporosis management. Advised continued use.    4) Recommend flu shot, Pneumonia vaccines, Tdap, and Shingrix vaccine, declines.    5) Follow up in months for medication recheck visit, Recheck BP soon.    Health Maintenance Due   Topic Date Due     PNEUMOCOCCAL (1 of 2 - PCV13) 04/29/2016     FALL RISK ASSESSMENT  10/04/2018     FIT Q1 YR  10/29/2018     End of Life Planning:  Patient currently has an advanced directive: Yes.  Practitioner is supportive of decision.    COUNSELING:  Reviewed preventive health counseling, as reflected in patient instructions    BP Readings from Last 1 Encounters:   10/31/18 (!) 154/92     Estimated body mass index is 32.77 kg/(m^2) as calculated from the following:    Height as of this encounter: 4' 11.5\" (1.511 m).    Weight as of this encounter: 165 lb (74.8 kg).    BP Screening:   Last 3 BP Readings:    BP Readings from Last 3 Encounters:   10/31/18 (!) 154/92   11/20/17 132/84   05/13/17 134/76       The following was recommended to the patient:  Re-screen BP within a year and recommended lifestyle modifications  Weight management plan: Discussed healthy diet and exercise guidelines and patient will follow up in 6 months in clinic to " re-evaluate.     reports that she has never smoked. She has never used smokeless tobacco.    Appropriate preventive services were discussed with this patient, including applicable screening as appropriate for cardiovascular disease, diabetes, osteopenia/osteoporosis, and glaucoma.  As appropriate for age/gender, discussed screening for colorectal cancer, prostate cancer, breast cancer, and cervical cancer. Checklist reviewing preventive services available has been given to the patient.    Reviewed patients plan of care and provided an AVS. The Basic Care Plan (routine screening as documented in Health Maintenance) for Jael meets the Care Plan requirement. This Care Plan has been established and reviewed with the Patient.    Counseling Resources:  ATP IV Guidelines  Pooled Cohorts Equation Calculator  Breast Cancer Risk Calculator  FRAX Risk Assessment  ICSI Preventive Guidelines  Dietary Guidelines for Americans, 2010  USDA's MyPlate  ASA Prophylaxis  Lung CA Screening    This document serves as a record of the services and decisions personally performed and made by Ken Gardiner MD. It was created on his behalf by Hussein Bliss, a trained medical scribe. The creation of this document is based on the provider's statements to the medical scribe.  Hussein Bliss October 31, 2018 10:15 AM     The information in this document, created by the medical scribe for me, accurately reflects the services I personally performed and the decisions made by me. I have reviewed and approved this document for accuracy prior to leaving the patient care area.  October 31, 2018          Ken Gardiner MD, FAAFP    [address]  [phone] Fax

## 2018-10-31 NOTE — MR AVS SNAPSHOT
After Visit Summary   10/31/2018    Jael Car    MRN: [id number]           Patient Information     Date Of Birth          1950        Visit Information        Provider Department      10/31/2018 9:40 AM Ken Gardiner MD [facility]        Today's Diagnoses     Encounter for Medicare annual wellness exam    -  1    Encounter for routine adult health examination without abnormal findings        MTHFR mutation (H)        History of pulmonary embolism        History of deep venous thrombosis        Hyperlipidemia LDL goal <130        Subclinical hyperthyroidism        Osteopenia of multiple sites        Primary osteoarthritis involving multiple joints        Ulcerative rectosigmoiditis 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 (H)        Elevated blood pressure reading without diagnosis of hypertension        Class 1 obesity with serious comorbidity and body mass index (BMI) of 32.0 to 32.9 in adult, unspecified obesity type        Polyp of colon, unspecified part of colon, unspecified type        Screen for colon cancer        Screening for ovarian cancer        Medication monitoring encounter        Need for prophylactic vaccination and inoculation against influenza        Need for prophylactic vaccination against Streptococcus pneumoniae (pneumococcus)          Care Instructions    Talk with GI and determine if FIT kit is appropriate. Ask GI about abdominal pain that comes and goes. Recommend flu shot, Pneumonia vaccines, and Tdap tetanus booster. Recommend Shingrix vaccine for shingles.    [facility]                        To reach your care team during and after hours:   [phone]  To reach our pharmacy:        [phone]    Clinic Hours                        Our clinic hours are:    Monday   7:30 am to 7:00 pm                  Tuesday through Friday 7:30 am to 5:00 pm                             Saturday   8:00 am to 12:00 pm      Sunday   Closed      Pharmacy Hours                         Our pharmacy hours are:    Monday   8:30 am to 7:00 pm       Tuesday to Friday  8:30 am to 6:00 pm                       Saturday    9:00 am to 1:00 pm              Sunday    Closed              There is also information available at our web site:  [email or web address]    If your provider ordered any lab tests and you do not receive the results within 10 business days, please call the clinic.    If you need a medication refill please contact your pharmacy.  Please allow 2-3 business days for your refill to be completed.    Our clinic offers telephone visits and e visits.  Please ask one of your team members to explain more.      Use [employer] (secure email communication and access to your chart) to send your primary care provider a message or make an appointment. Ask someone on your Team how to sign up for [employer].  Immunizations                      Immunization History   Administered Date(s) Administered     Zoster vaccine, live 07/25/2013        Health Maintenance                         Health Maintenance Due   Topic Date Due     Pneumococcal Vaccine (1 of 2 - PCV13) 04/29/2016     Pap Smear - every 3 years  08/14/2018     Cholesterol Lab - yearly  10/13/2018     FALL RISK ASSESSMENT  10/04/2018     Colon Cancer Screening - FIT Test - yearly  10/29/2018     Liver Monitoring Lab - yearly  11/29/2018           Services Typically covered by Medicare Recommended Completed   Vaccines    Pneumonoccol    Influenza    Hepatitis B (if medium/high risk)     Once for patients after age 65    Yearly  Medium/high risk factors:    End Stage Kidney Disease    Hemophiliacs who received Factor XIII or IX concentrates    Clients of institutions for developmentally disabled    Persons who live in same house as a Hepatitis B carrier    Homosexual men    Illicit injectable drug users    Health care workers     Mammogram Covered: One-time screen between age 35-39, annually for age 40+     Pap and Pelvic Exam Covered:  Annually if  high risk,  or childbearing age with abnormal Pap in last 3 years.  Q24 months for all other women     Prostate Cancer Screening    Digital rectal exam    PSA Covered: Annually for all men > age 50     Corolrectal Cancer Screening Screening colonoscopy every 10 years, more often for high risk patients     Diabetes Self-Management Training Requires referral by treating physician for patient with diabetes     Diabetes Screening    Fasting blood sugar or glucose tolerance test   Once yearly, twice yearly if prediabetic     Cardiovascular Screening Blood Tests    Total Cholesterol    HDL    Triglycerides Every 5 years     Medical Nutrition Therapy for Diabetes or Renal Disease Requires referral by treating physician for patient with diabetes or kidney disease     Glaucoma Screening Annually for patients with one of the following risk factors:    Diabetes Mellitus    Family history of Glaucoma    -American age 50 and over    -American age 65 and over     Bone Mass Measurement Every 24 months if one of the following risk factors:    Estrogen deficiency    Vertebral abnormalities on x-ray indicative of Osteoporosis, Osteopenia, or Vertebral fracture    Receiving/expected to receive the equivalent of at least 5 mg of Prednisone per day for > 3 months    Hyperparathyroidism    Patient being monitored for response to Osteoporosis Therapy     One-time AAA screen  Must be ordered as part of Medicare IPPE   Any patient with a family history of AAA    Males Age 65-75, with history of smoking at least 100 cigarettes in lifetime     Smoking Cessation Counseling Beneficiaries who use tobacco are eligible to receive 2 cessation attempts per year; each attempt includes maximum of 4 sessions     HIV Screening Annually for beneficiaries at increased risk:       Increased risk for HIV infection is defined in the  National Coverage Determinations (NCD) Manual,  Publication 100-03 Sections 190.14 (diagnostic)  and 210.7 (screening). See http://www.cms.gov/manuals/downloads/zsg500d9_Koqf0.pdf and http://www.cms.gov/manuals/downloads/tgc623r8_Blwy5.pdf on the Internet.  Three times per pregnancy for beneficiaries who are pregnant.     Future Annual Wellness Visit Annually, for all beneficiaries.       Preventive Health Recommendations    See your health care provider every year to    Review health changes.     Discuss preventive care.      Review your medicines if your doctor has prescribed any.      You no longer need a yearly Pap test unless you've had an abnormal Pap test in the past 10 years. If you have vaginal symptoms, such as bleeding or discharge, be sure to talk with your provider about a Pap test.      Every 1 to 2 years, have a mammogram.  If you are over 69, talk with your health care provider about whether or not you want to continue having screening mammograms.      Every 10 years, have a colonoscopy. Or, have a yearly FIT test (stool test). These exams will check for colon cancer.       Have a cholesterol test every 5 years, or more often if your doctor advises it.       Have a diabetes test (fasting glucose) every three years. If you are at risk for diabetes, you should have this test more often.       At age 65, have a bone density scan (DEXA) to check for osteoporosis (brittle bone disease).    Shots:    Get a flu shot each year.    Get a tetanus shot every 10 years.    Talk to your doctor about your pneumonia vaccines. There are now two you should receive - Pneumovax (PPSV 23) and Prevnar (PCV 13).    Talk to your pharmacist about the shingles vaccine.    Talk to your doctor about the hepatitis B vaccine.    Nutrition:     Eat at least 5 servings of fruits and vegetables each day.      Eat whole-grain bread, whole-wheat pasta and brown rice instead of white grains and rice.      Get adequate Calcium and Vitamin D.     Lifestyle    Exercise at least 150 minutes a week (30 minutes a day, 5 days a week).  This will help you control your weight and prevent disease.      Limit alcohol to one drink per day.      No smoking.       Wear sunscreen to prevent skin cancer.       See your dentist twice a year for an exam and cleaning.      See your eye doctor every 1 to 2 years to screen for conditions such as glaucoma, macular degeneration and cataracts.    Personalized Prevention Plan  You are due for the preventive services outlined below.  Your care team is available to assist you in scheduling these services.  If you have already completed any of these items, please share that information with your care team to update in your medical record.  Health Maintenance Due   Topic Date Due     Pneumococcal Vaccine (1 of 2 - PCV13) 04/29/2016     Pap Smear - every 3 years  08/14/2018     Flu Vaccine (1) 09/01/2018     Cholesterol Lab - yearly  10/13/2018     FALL RISK ASSESSMENT  10/04/2018     Colon Cancer Screening - FIT Test - yearly  10/29/2018     Liver Monitoring Lab - yearly  11/29/2018             Follow-ups after your visit        Future tests that were ordered for you today     Open Future Orders        Priority Expected Expires Ordered    Comprehensive metabolic panel Routine 11/30/2018 12/30/2018 10/31/2018    Lipid panel reflex to direct LDL Fasting Routine 11/30/2018 12/30/2018 10/31/2018    CK total Routine 11/30/2018 12/30/2018 10/31/2018            Who to contact     If you have questions or need follow up information about today's clinic visit or your schedule please contact [facility] directly at [phone].  Normal or non-critical lab and imaging results will be communicated to you by MyChart, letter or phone within 4 business days after the clinic has received the results. If you do not hear from us within 7 days, please contact the clinic through MyChart or phone. If you have a critical or abnormal lab result, we will notify you by phone as soon as possible.  Submit refill requests through  "[employer] or call your pharmacy and they will forward the refill request to us. Please allow 3 business days for your refill to be completed.          Additional Information About Your Visit        MyChart Information     [employer] gives you secure access to your electronic health record. If you see a primary care provider, you can also send messages to your care team and make appointments. If you have questions, please call your primary care clinic.  If you do not have a primary care provider, please call [phone] and they will assist you.        Care EveryWhere ID     This is your Care EveryWhere ID. This could be used by other organizations to access your [facility] medical records  [id number]        Your Vitals Were     Pulse Temperature Height Last Period Pulse Oximetry BMI (Body Mass Index)    107 99  F (37.2  C) (Oral) 4' 11.5\" (1.511 m) 06/03/2004 98% 32.77 kg/m2       Blood Pressure from Last 3 Encounters:   10/31/18 (!) 154/92   11/20/17 132/84   05/13/17 134/76    Weight from Last 3 Encounters:   10/31/18 165 lb (74.8 kg)   11/20/17 163 lb (73.9 kg)   05/13/17 152 lb (68.9 kg)              We Performed the Following     *UA reflex to Microscopic and Culture ([facility] and [facility] (except [place] and [place])     Albumin Random Urine Quantitative with Creat Ratio     CBC with platelets     CK total     Comprehensive metabolic panel     CRP inflammation     Erythrocyte sedimentation rate auto     Hemoglobin A1c     Lipid panel reflex to direct LDL Fasting     T4 free     TSH with free T4 reflex     Vitamin D Deficiency          Today's Medication Changes          These changes are accurate as of 10/31/18 10:53 AM.  If you have any questions, ask your nurse or doctor.               Start taking these medicines.        Dose/Directions    atorvastatin 20 MG tablet   Commonly known as:  LIPITOR   Used for:  Hyperlipidemia LDL goal <130   Started by:  Ken Gardiner MD        Dose:  20 mg   Take 1 tablet " (20 mg) by mouth daily   Quantity:  90 tablet   Refills:  3         These medicines have changed or have updated prescriptions.        Dose/Directions    cholecalciferol 5000 units Caps capsule   Commonly known as:  vitamin D3   This may have changed:  Another medication with the same name was removed. Continue taking this medication, and follow the directions you see here.   Changed by:  Ken Gardiner MD        Take by mouth every other day   Refills:  0         Stop taking these medicines if you haven't already. Please contact your care team if you have questions.     simvastatin 40 MG tablet   Commonly known as:  ZOCOR   Stopped by:  Ken Gardiner MD                Where to get your medicines      These medications [facility] [place] [place] [facility] [address]  [address], [place] [address]     Phone:  [phone]     atorvastatin 20 MG tablet                Primary Care Provider Office Phone # Fax #    Ken Gardiner [phone]       [facility] [address]        Equal Access to Services     [facility]: Hadii aad ku hadasho Soomaali, waaxda luqadaha, qaybta kaalmada adeegyada, waxay idiin hayaan rubyeg susanaaramiah roberts . So [facility] [phone].    ATENCIÓN: Si habla español, tiene a gary disposición servicios gratuitos de asistencia lingüística. [facility] [phone].    We comply with applicable federal civil rights laws and [place] laws. We do not discriminate on the basis of race, color, national origin, age, disability, sex, sexual orientation, or gender identity.            Thank you!     Thank you for choosing [facility]  for your care. Our goal is always to provide you with excellent care. Hearing back from our patients is one way we can continue to improve our services. Please take a few minutes to complete the written survey that you may receive in the mail after your visit with us. Thank you!             Your  Updated Medication List - Protect others around you: Learn how to safely use, store and throw away your medicines at www.disposemymeds.org.          This list is accurate as of 10/31/18 10:53 AM.  Always use your most recent med list.                   Brand Name Dispense Instructions for use Diagnosis    aspirin 81 MG EC tablet      Take 81 mg by mouth daily.        atorvastatin 20 MG tablet    LIPITOR    90 tablet    Take 1 tablet (20 mg) by mouth daily    Hyperlipidemia LDL goal <130       balsalazide 750 MG capsule    COLAZAL     Take 4 capsules (3,000 mg) by mouth 2 times daily        CALCIUM GUMMIES 250-100-500 MG-MG-UNIT Chew   Generic drug:  Calcium-Phosphorus-Vitamin D      Take by mouth 2 times daily        cholecalciferol 5000 units Caps capsule    vitamin D3     Take by mouth every other day        multivitamin  peds with iron 60 MG chewable tablet      Take 1 chew tab by mouth daily.

## 2018-10-31 NOTE — PATIENT INSTRUCTIONS
Talk with GI and determine if FIT kit is appropriate. Ask GI about abdominal pain that comes and goes. Recommend flu shot, Pneumonia vaccines, and Tdap tetanus booster. Recommend Shingrix vaccine for shingles. Follow up for blood pressure recheck with pharmacy/nurse.    [facility]                        To reach your care team during and after hours:   [phone]  To reach our pharmacy:        [phone]    Clinic Hours                        Our clinic hours are:    Monday   7:30 am to 7:00 pm                  Tuesday through Friday 7:30 am to 5:00 pm                             Saturday   8:00 am to 12:00 pm      Sunday   Closed      Pharmacy Hours                        Our pharmacy hours are:    Monday   8:30 am to 7:00 pm       Tuesday to Friday  8:30 am to 6:00 pm                       Saturday    9:00 am to 1:00 pm              Sunday    Closed              There is also information available at our web site:  [email or web address]    If your provider ordered any lab tests and you do not receive the results within 10 business days, please call the clinic.    If you need a medication refill please contact your pharmacy.  Please allow 2-3 business days for your refill to be completed.    Our clinic offers telephone visits and e visits.  Please ask one of your team members to explain more.      Use MyChart (secure email communication and access to your chart) to send your primary care provider a message or make an appointment. Ask someone on your Team how to sign up for [employer].  Immunizations                      Immunization History   Administered Date(s) Administered     Zoster vaccine, live 07/25/2013        Health Maintenance                         Health Maintenance Due   Topic Date Due     Pneumococcal Vaccine (1 of 2 - PCV13) 04/29/2016     Pap Smear - every 3 years  08/14/2018     Cholesterol Lab - yearly  10/13/2018     FALL RISK ASSESSMENT  10/04/2018     Colon Cancer Screening -  FIT Test - yearly  10/29/2018     Liver Monitoring Lab - yearly  11/29/2018           Services Typically covered by Medicare Recommended Completed   Vaccines    Pneumonoccol    Influenza    Hepatitis B (if medium/high risk)     Once for patients after age 65    Yearly  Medium/high risk factors:    End Stage Kidney Disease    Hemophiliacs who received Factor XIII or IX concentrates    Clients of institutions for developmentally disabled    Persons who live in same house as a Hepatitis B carrier    Homosexual men    Illicit injectable drug users    Health care workers     Mammogram Covered: One-time screen between age 35-39, annually for age 40+     Pap and Pelvic Exam Covered: Annually if  high risk,  or childbearing age with abnormal Pap in last 3 years.  Q24 months for all other women     Prostate Cancer Screening    Digital rectal exam    PSA Covered: Annually for all men > age 50     Corolrectal Cancer Screening Screening colonoscopy every 10 years, more often for high risk patients     Diabetes Self-Management Training Requires referral by treating physician for patient with diabetes     Diabetes Screening    Fasting blood sugar or glucose tolerance test   Once yearly, twice yearly if prediabetic     Cardiovascular Screening Blood Tests    Total Cholesterol    HDL    Triglycerides Every 5 years     Medical Nutrition Therapy for Diabetes or Renal Disease Requires referral by treating physician for patient with diabetes or kidney disease     Glaucoma Screening Annually for patients with one of the following risk factors:    Diabetes Mellitus    Family history of Glaucoma    -American age 50 and over    -American age 65 and over     Bone Mass Measurement Every 24 months if one of the following risk factors:    Estrogen deficiency    Vertebral abnormalities on x-ray indicative of Osteoporosis, Osteopenia, or Vertebral fracture    Receiving/expected to receive the equivalent of at least 5 mg of  Prednisone per day for > 3 months    Hyperparathyroidism    Patient being monitored for response to Osteoporosis Therapy     One-time AAA screen  Must be ordered as part of Medicare IPPE   Any patient with a family history of AAA    Males Age 65-75, with history of smoking at least 100 cigarettes in lifetime     Smoking Cessation Counseling Beneficiaries who use tobacco are eligible to receive 2 cessation attempts per year; each attempt includes maximum of 4 sessions     HIV Screening Annually for beneficiaries at increased risk:       Increased risk for HIV infection is defined in the  National Coverage Determinations (NCD) Manual,  Publication 100-03 Sections 190.14 (diagnostic) and 210.7 (screening). See http://www.cms.gov/manuals/downloads/olb442b1_Rmrc4.pdf and http://www.cms.gov/manuals/downloads/cth667s9_Msrd2.pdf on the Internet.  Three times per pregnancy for beneficiaries who are pregnant.     Future Annual Wellness Visit Annually, for all beneficiaries.       Preventive Health Recommendations    See your health care provider every year to    Review health changes.     Discuss preventive care.      Review your medicines if your doctor has prescribed any.      You no longer need a yearly Pap test unless you've had an abnormal Pap test in the past 10 years. If you have vaginal symptoms, such as bleeding or discharge, be sure to talk with your provider about a Pap test.      Every 1 to 2 years, have a mammogram.  If you are over 69, talk with your health care provider about whether or not you want to continue having screening mammograms.      Every 10 years, have a colonoscopy. Or, have a yearly FIT test (stool test). These exams will check for colon cancer.       Have a cholesterol test every 5 years, or more often if your doctor advises it.       Have a diabetes test (fasting glucose) every three years. If you are at risk for diabetes, you should have this test more often.       At age 65, have a bone  density scan (DEXA) to check for osteoporosis (brittle bone disease).    Shots:    Get a flu shot each year.    Get a tetanus shot every 10 years.    Talk to your doctor about your pneumonia vaccines. There are now two you should receive - Pneumovax (PPSV 23) and Prevnar (PCV 13).    Talk to your pharmacist about the shingles vaccine.    Talk to your doctor about the hepatitis B vaccine.    Nutrition:     Eat at least 5 servings of fruits and vegetables each day.      Eat whole-grain bread, whole-wheat pasta and brown rice instead of white grains and rice.      Get adequate Calcium and Vitamin D.     Lifestyle    Exercise at least 150 minutes a week (30 minutes a day, 5 days a week). This will help you control your weight and prevent disease.      Limit alcohol to one drink per day.      No smoking.       Wear sunscreen to prevent skin cancer.       See your dentist twice a year for an exam and cleaning.      See your eye doctor every 1 to 2 years to screen for conditions such as glaucoma, macular degeneration and cataracts.    Personalized Prevention Plan  You are due for the preventive services outlined below.  Your care team is available to assist you in scheduling these services.  If you have already completed any of these items, please share that information with your care team to update in your medical record.  Health Maintenance Due   Topic Date Due     Pneumococcal Vaccine (1 of 2 - PCV13) 04/29/2016     Pap Smear - every 3 years  08/14/2018     Flu Vaccine (1) 09/01/2018     Cholesterol Lab - yearly  10/13/2018     FALL RISK ASSESSMENT  10/04/2018     Colon Cancer Screening - FIT Test - yearly  10/29/2018     Liver Monitoring Lab - yearly  11/29/2018

## 2018-11-01 ENCOUNTER — MYC MEDICAL ADVICE (OUTPATIENT)
Dept: FAMILY MEDICINE | Facility: CLINIC | Age: 68
End: 2018-11-01

## 2018-11-01 LAB
ALBUMIN SERPL-MCNC: 3.8 G/DL (ref 3.4–5)
ALP SERPL-CCNC: 122 U/L (ref 40–150)
ALT SERPL W P-5'-P-CCNC: 23 U/L (ref 0–50)
ANION GAP SERPL CALCULATED.3IONS-SCNC: 11 MMOL/L (ref 3–14)
AST SERPL W P-5'-P-CCNC: 25 U/L (ref 0–45)
BACTERIA SPEC CULT: NORMAL
BILIRUB SERPL-MCNC: 0.2 MG/DL (ref 0.2–1.3)
BUN SERPL-MCNC: 13 MG/DL (ref 7–30)
CALCIUM SERPL-MCNC: 9.5 MG/DL (ref 8.5–10.1)
CHLORIDE SERPL-SCNC: 105 MMOL/L (ref 94–109)
CHOLEST SERPL-MCNC: 152 MG/DL
CK SERPL-CCNC: 35 U/L (ref 30–225)
CO2 SERPL-SCNC: 27 MMOL/L (ref 20–32)
CREAT SERPL-MCNC: 0.64 MG/DL (ref 0.52–1.04)
CREAT UR-MCNC: 181 MG/DL
DEPRECATED CALCIDIOL+CALCIFEROL SERPL-MC: 40 UG/L (ref 20–75)
GFR SERPL CREATININE-BSD FRML MDRD: >90 ML/MIN/1.7M2
GLUCOSE SERPL-MCNC: 104 MG/DL (ref 70–99)
HDLC SERPL-MCNC: 43 MG/DL
LDLC SERPL CALC-MCNC: 70 MG/DL
MICROALBUMIN UR-MCNC: 55 MG/L
MICROALBUMIN/CREAT UR: 30.17 MG/G CR (ref 0–25)
NONHDLC SERPL-MCNC: 109 MG/DL
POTASSIUM SERPL-SCNC: 3.7 MMOL/L (ref 3.4–5.3)
PROT SERPL-MCNC: 7.7 G/DL (ref 6.8–8.8)
SODIUM SERPL-SCNC: 143 MMOL/L (ref 133–144)
SPECIMEN SOURCE: NORMAL
T4 FREE SERPL-MCNC: 1.11 NG/DL (ref 0.76–1.46)
TRIGL SERPL-MCNC: 195 MG/DL
TSH SERPL DL<=0.005 MIU/L-ACNC: 0.3 MU/L (ref 0.4–4)

## 2018-11-01 NOTE — TELEPHONE ENCOUNTER
Routing to PCP for further review/recommendations/orders.    Agnieszka Sanchez RN  [facility]

## 2018-11-02 NOTE — PROGRESS NOTES
Jael  I have reviewed your recent labs. Here are the results:    -Vitamin D level is normal and getting 1000 IU daily in your diet or supplements is recommended.   -Urine culture is normal.  There is no need for antibiotics at this point.  If new, worsening or persistent symptoms occur, then you should call or return for a recheck.     If you have any questions please do not hesitate to contact our office via phone ([phone]) or MyChart.    Arabella Milligan, MS, PA-C  [facility] [place]

## 2018-11-06 ENCOUNTER — TRANSFERRED RECORDS (OUTPATIENT)
Dept: HEALTH INFORMATION MANAGEMENT | Facility: CLINIC | Age: 68
End: 2018-11-06

## 2018-11-06 DIAGNOSIS — R31.29 OTHER MICROSCOPIC HEMATURIA: Primary | ICD-10-CM

## 2018-11-21 ENCOUNTER — OFFICE VISIT (OUTPATIENT)
Dept: UROLOGY | Facility: CLINIC | Age: 68
End: 2018-11-21
Payer: COMMERCIAL

## 2018-11-21 VITALS — WEIGHT: 165 LBS | HEART RATE: 112 BPM | HEIGHT: 59 IN | BODY MASS INDEX: 33.26 KG/M2 | OXYGEN SATURATION: 98 %

## 2018-11-21 DIAGNOSIS — R31.29 MICROSCOPIC HEMATURIA: Primary | ICD-10-CM

## 2018-11-21 LAB
ALBUMIN UR-MCNC: NEGATIVE MG/DL
APPEARANCE UR: CLEAR
BILIRUB UR QL STRIP: NEGATIVE
COLOR UR AUTO: YELLOW
GLUCOSE UR STRIP-MCNC: NEGATIVE MG/DL
HGB UR QL STRIP: ABNORMAL
KETONES UR STRIP-MCNC: NEGATIVE MG/DL
LEUKOCYTE ESTERASE UR QL STRIP: ABNORMAL
NITRATE UR QL: NEGATIVE
PH UR STRIP: 6 PH (ref 5–7)
RBC #/AREA URNS AUTO: 15 /HPF (ref 0–2)
SOURCE: ABNORMAL
SP GR UR STRIP: 1.01 (ref 1–1.03)
UROBILINOGEN UR STRIP-ACNC: 0.2 EU/DL (ref 0.2–1)
WBC #/AREA URNS AUTO: 2 /HPF (ref 0–5)

## 2018-11-21 PROCEDURE — 88112 CYTOPATH CELL ENHANCE TECH: CPT | Performed by: PHYSICIAN ASSISTANT

## 2018-11-21 PROCEDURE — 99204 OFFICE O/P NEW MOD 45 MIN: CPT | Performed by: PHYSICIAN ASSISTANT

## 2018-11-21 PROCEDURE — 81001 URINALYSIS AUTO W/SCOPE: CPT | Performed by: PHYSICIAN ASSISTANT

## 2018-11-21 ASSESSMENT — PAIN SCALES - GENERAL: PAINLEVEL: NO PAIN (0)

## 2018-11-21 NOTE — MR AVS SNAPSHOT
After Visit Summary   11/21/2018    Jael Car    MRN: [id number]           Patient Information     Date Of Birth          1950        Visit Information        Provider Department      11/21/2018 4:00 PM Rosemary Hamlin PA-C [facility] [place]        Today's Diagnoses     Microscopic hematuria    -  1      Care Instructions     -Urinalysis and micro analysis.   - Urine cytology to look for abnormal cells.  - CT scan   - Cystoscopy with the  urologist to evaluate the interior of the bladder. Follow up as recommended by the urologist.            Follow-ups after your visit        Your next 10 appointments already scheduled     Jan 02, 2019 11:00 AM CST   CT UROGRAM WO & W CONTRAST with [address] ([facility])    [address] [facility] [address]  [facility] [address] [phone]           How do I prepare for my exam? (Food and drink instructions) **You will have contrast for this exam.** Do not eat or drink for 2 hours before your exam. If you need to take medicine, you may take it with small sips of water. (We may ask you to take liquid medicine as well.)  The day before your exam, drink extra fluids at least six 8-ounce glasses (unless your doctor tells you to restrict your fluids).  How do I prepare for my exam? (Other instructions) Patients over 70 or patients with diabetes or kidney problems: If you haven t had a blood test (creatinine test) within the last 30 days, the Cardiologist/Radiologist may require you to get this test prior to your exam.  What should I wear: Please wear loose clothing, such as a sweat suit or jogging clothes.  Avoid snaps, zippers and other metal. We may ask you to undress and put on a hospital gown.  How long does the exam take: Most scans take less than 20 minutes.  What should I bring: Please bring any scans or X-rays taken at other hospitals, if similar tests  were done. Also bring a list of your medicines, including vitamins, minerals and over-the-counter drugs. It is safest to leave personal items at home.  Do I need a :  No  is needed.  What do I need to tell my doctor? Be sure to tell your doctor: * If you have any allergies. * If there s any chance you are pregnant. * If you are breastfeeding. * If you have diabetes as your medication may need to be adjusted for this exam.  What should I do after the exam: No restrictions, You may resume normal activities.  What is this test: A CT (computed tomography) scan is a series of pictures that allows us to look inside your body. The scanner creates images of the body in cross sections, much like slices of bread. This helps us see any problems more clearly. You may receive contrast (X-ray dye) before or during your scan. Contrast is given through an IV (small needle in your arm).  Who should I call with questions: If you have any questions, please call the Imaging Department where you will have your exam. Directions, parking instructions, and other information is available on our website, [email or web address].            Jan 07, 2019  1:00 PM CST   Cystoscopy with Juan Redmond MD, UB CYF   [facility] ([facility])    [address]  [address]  [facility] [id number] [phone]            May 08, 2019 10:00 AM CDT   [facility]   [facility]    [address] [place] [address] [facility] [address] [phone]           Bring a current list of meds and any records pertaining to this visit. For Physicals, please bring immunization records and any forms needing to be filled out. Please arrive 10 minutes early to complete paperwork.              Future tests that were ordered for you today     Open Future Orders        Priority Expected Expires Ordered    CT  "Urogram wo & w Contrast Routine  11/21/2019 11/21/2018            Who to contact     If you have questions or need follow up information about today's clinic visit or your schedule please contact [facility] [place] directly at [phone].  Normal or non-critical lab and imaging results will be communicated to you by MyChart, letter or phone within 4 business days after the clinic has received the results. If you do not hear from us within 7 days, please contact the clinic through [email or web address] or phone. If you have a critical or abnormal lab result, we will notify you by phone as soon as possible.  Submit refill requests through [employer] or call your pharmacy and they will forward the refill request to us. Please allow 3 business days for your refill to be completed.          Additional Information About Your Visit        [email or web address] Information     [employer] gives you secure access to your electronic health record. If you see a primary care provider, you can also send messages to your care team and make appointments. If you have questions, please call your primary care clinic.  If you do not have a primary care provider, please call [phone] and they will assist you.        Care EveryWhere ID     This is your Care EveryWhere ID. This could be used by other organizations to access your [place] medical records  [id number]        Your Vitals Were     Pulse Height Last Period Pulse Oximetry BMI (Body Mass Index)       112 1.499 m (4' 11\") 06/03/2004 98% 33.33 kg/m2        Blood Pressure from Last 3 Encounters:   10/31/18 (!) 154/92   11/20/17 132/84   05/13/17 134/76    Weight from Last 3 Encounters:   11/21/18 74.8 kg (165 lb)   10/31/18 74.8 kg (165 lb)   11/20/17 73.9 kg (163 lb)              We Performed the Following     Cytology non gyn     UA without Microscopic     Urine Micro Urologic Phys        Primary Care Provider Office Phone # Fax #    Ken Gardiner [phone] " [phone]       [address] [place] [address]        Equal Access to Services     KRISS VELA : Hadii aad ku haddanielladimitris Aureliasherry, wakiarada concepciondejaha, qavietta kamarcela renepaty, jason zheng shreyayasemin mezakeithmiah gonzales. So [facility] [phone].    ATENCIÓN: Si habla español, tiene a gary disposición servicios gratuitos de asistencia lingüística. Griselame al [phone].    We comply with applicable federal civil rights laws and [place] laws. We do not discriminate on the basis of race, color, national origin, age, disability, sex, sexual orientation, or gender identity.            Thank you!     Thank you for choosing [facility] [place]  for your care. Our goal is always to provide you with excellent care. Hearing back from our patients is one way we can continue to improve our services. Please take a few minutes to complete the written survey that you may receive in the mail after your visit with us. Thank you!             Your Updated Medication List - Protect others around you: Learn how to safely use, store and throw away your medicines at www.disposemymeds.org.          This list is accurate as of 11/21/18  4:22 PM.  Always use your most recent med list.                   Brand Name Dispense Instructions for use Diagnosis    aspirin 81 MG EC tablet      Take 81 mg by mouth daily.        atorvastatin 20 MG tablet    LIPITOR    90 tablet    Take 1 tablet (20 mg) by mouth daily    Hyperlipidemia LDL goal <130       balsalazide 750 MG capsule    COLAZAL     Take 4 capsules (3,000 mg) by mouth 2 times daily        CALCIUM GUMMIES 250-100-500 MG-MG-UNIT Chew   Generic drug:  Calcium-Phosphorus-Vitamin D      Take by mouth 2 times daily        cholecalciferol 5000 units (125 mcg) Caps capsule    vitamin D3     Take by mouth every other day        multivitamin  peds with iron 60 MG chewable tablet      Take 1 chew tab by mouth daily.

## 2018-11-21 NOTE — PROGRESS NOTES
CC: Hematuria.    HPI: It is a pleasure to see Ms. Jael Car, a 68 year old female, asked to be seen in consultation by Dr. Gardiner for evaluation of micro hematuria. This was first detected on 10/13/17 and again 10/31/18.    The patient any gross hematuria.  Ms. Car voids without difficulty, and reports nocturia of 1.  She currently denies any dysuria, pyuria, hesitancy, intermittency, feelings of incomplete emptying, or any recent history of urinary tract infections or stones.    Hematuria Risk Factors:  Age >40: Yes     Smoking history: Never  Occupational exposure to chemicals or dyes (ie, benzenes, aromatic amines): no  History of urologic disorder or disease: no  History of irritative voiding symptoms: no  History of urinary tract infection: no  Analgesic abuse: no  History of pelvic irradiation: no    Past Medical History:   Diagnosis Date     Colon polyps 12/2017    tubular adenoma x 1 - 3mm - due 5 yrs     DVT (deep venous thrombosis) (H) 2011    PE - postop     Elevated serum protein level     Protein S     History of thrombophlebitis      Hyperlipidemia LDL goal < 130 2006     Microscopic hematuria      MTHFR mutation (H)      Mumps      Osteoarthrosis of knee 2014    Dr Augustine     Osteopenia      Pulmonary emboli (H) 2011    Post op     Subclinical hyperthyroidism 2014     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       atorvastatin (LIPITOR) 20 MG tablet Take 1 tablet (20 mg) by mouth daily 90 tablet 3     balsalazide (COLAZAL) 750 MG capsule Take 4  "capsules (3,000 mg) by mouth 2 times daily       Calcium-Phosphorus-Vitamin D (CALCIUM GUMMIES) 250-100-500 MG-MG-UNIT CHEW Take by mouth 2 times daily       cholecalciferol (VITAMIN D3) 5000 UNITS CAPS capsule Take by mouth every other day       multivitamin  peds with iron (FLINTSTONES COMPLETE) 60 MG chewable tablet Take 1 chew tab by mouth daily.       No Known Allergies  FAMILY HISTORY: There is no reported history of genitourinary carcinoma.  There is no history of urolithiasis.      SOCIAL HISTORY: The patient does not smoke cigarettes. Denies EtOH and illicit drug abuse.      ROS: A comprehensive 14 point ROS was obtained and negative except for that outlined above in the HPI.    PHYSICAL EXAM:   Vitals:    11/21/18 1535   Pulse: 112   SpO2: 98%   Weight: 74.8 kg (165 lb)   Height: 1.499 m (4' 11\")   GEN: NAD  EYES: EOMI  MOUTH: MMM  NECK: Supple  RESP: Unlabored breathing  CARDIAC: No LE edema  SKIN: Warm  ABD: soft  NEURO: AAO    Office Visit on 10/31/2018   Component Date Value Ref Range Status     Sodium 10/31/2018 143  133 - 144 mmol/L Final     Potassium 10/31/2018 3.7  3.4 - 5.3 mmol/L Final     Chloride 10/31/2018 105  94 - 109 mmol/L Final     Carbon Dioxide 10/31/2018 27  20 - 32 mmol/L Final     Anion Gap 10/31/2018 11  3 - 14 mmol/L Final     Glucose 10/31/2018 104* 70 - 99 mg/dL Final    Fasting specimen     Urea Nitrogen 10/31/2018 13  7 - 30 mg/dL Final     Creatinine 10/31/2018 0.64  0.52 - 1.04 mg/dL Final     GFR Estimate 10/31/2018 >90  >60 mL/min/1.7m2 Final    Non  GFR Calc     GFR Estimate If Black 10/31/2018 >90  >60 mL/min/1.7m2 Final    African American GFR Calc     Calcium 10/31/2018 9.5  8.5 - 10.1 mg/dL Final     Bilirubin Total 10/31/2018 0.2  0.2 - 1.3 mg/dL Final     Albumin 10/31/2018 3.8  3.4 - 5.0 g/dL Final     Protein Total 10/31/2018 7.7  6.8 - 8.8 g/dL Final     Alkaline Phosphatase 10/31/2018 122  40 - 150 U/L Final     ALT 10/31/2018 23  0 - 50 U/L " Final     AST 10/31/2018 25  0 - 45 U/L Final     Cholesterol 10/31/2018 152  <200 mg/dL Final     Triglycerides 10/31/2018 195* <150 mg/dL Final    Comment: Borderline high:  150-199 mg/dl  High:             200-499 mg/dl  Very high:       >499 mg/dl  Fasting specimen       HDL Cholesterol 10/31/2018 43* >49 mg/dL Final     LDL Cholesterol Calculated 10/31/2018 70  <100 mg/dL Final    Desirable:       <100 mg/dl     Non HDL Cholesterol 10/31/2018 109  <130 mg/dL Final     CK Total 10/31/2018 35  30 - 225 U/L Final     WBC 10/31/2018 6.3  4.0 - 11.0 10e9/L Final     RBC Count 10/31/2018 5.00  3.8 - 5.2 10e12/L Final     Hemoglobin 10/31/2018 13.6  11.7 - 15.7 g/dL Final     Hematocrit 10/31/2018 42.1  35.0 - 47.0 % Final     MCV 10/31/2018 84  78 - 100 fl Final     MCH 10/31/2018 27.2  26.5 - 33.0 pg Final     MCHC 10/31/2018 32.3  31.5 - 36.5 g/dL Final     RDW 10/31/2018 13.5  10.0 - 15.0 % Final     Platelet Count 10/31/2018 311  150 - 450 10e9/L Final     TSH 10/31/2018 0.30* 0.40 - 4.00 mU/L Final     Hemoglobin A1C 10/31/2018 5.7* 0 - 5.6 % Final    Comment: Normal <5.7% Prediabetes 5.7-6.4%  Diabetes 6.5% or higher - adopted from ADA   consensus guidelines.       Creatinine Urine 10/31/2018 181  mg/dL Final     Albumin Urine mg/L 10/31/2018 55  mg/L Final     Albumin Urine mg/g Cr 10/31/2018 30.17* 0 - 25 mg/g Cr Final     Color Urine 10/31/2018 Yellow   Final     Appearance Urine 10/31/2018 Clear   Final     Glucose Urine 10/31/2018 Negative  NEG^Negative mg/dL Final     Bilirubin Urine 10/31/2018 Negative  NEG^Negative Final     Ketones Urine 10/31/2018 Negative  NEG^Negative mg/dL Final     Specific Gravity Urine 10/31/2018 1.020  1.003 - 1.035 Final     Blood Urine 10/31/2018 Moderate* NEG^Negative Final     pH Urine 10/31/2018 7.0  5.0 - 7.0 pH Final     Protein Albumin Urine 10/31/2018 Trace* NEG^Negative mg/dL Final     Urobilinogen Urine 10/31/2018 0.2  0.2 - 1.0 EU/dL Final     Nitrite Urine  10/31/2018 Negative  NEG^Negative Final     Leukocyte Esterase Urine 10/31/2018 Trace* NEG^Negative Final     Source 10/31/2018 Midstream Urine   Final     T4 Free 10/31/2018 1.11  0.76 - 1.46 ng/dL Final     Vitamin D Deficiency screening 10/31/2018 40  20 - 75 ug/L Final    Comment: Season, race, dietary intake, and treatment affect the concentration of   25-hydroxy-Vitamin D. Values may decrease during winter months and increase   during summer months. Values 20-29 ug/L may indicate Vitamin D insufficiency   and values <20 ug/L may indicate Vitamin D deficiency.  Vitamin D determination is routinely performed by an immunoassay specific for   25 hydroxyvitamin D3.  If an individual is on vitamin D2 (ergocalciferol)   supplementation, please specify 25 OH vitamin D2 and D3 level determination by   LCMSMS test [id number].       Sed Rate 10/31/2018 20  0 - 30 mm/h Final     CRP Inflammation 10/31/2018 9.2* 0.0 - 8.0 mg/L Final     WBC Urine 10/31/2018 0 - 5  OTO5^0 - 5 /HPF Final     RBC Urine 10/31/2018 10-25* OTO2^O - 2 /HPF Final     Squamous Epithelial /LPF Urine 10/31/2018 Few  FEW^Few /LPF Final     Bacteria Urine 10/31/2018 Few* NEG^Negative /HPF Final     Mucous Urine 10/31/2018 Present* NEG^Negative /LPF Final     Specimen Description 10/31/2018 Midstream Urine   Final     Culture Micro 10/31/2018    Final                    Value:10,000 to 50,000 colonies/mL  mixed urogenital juancho  Susceptibility testing not routinely done         IMAGING: none    ASSESSMENT and PLAN:    68 year old female with micro hematuria.  The differential diagnosis at this point includes stone disease, infection, vaginal contaminant, urothelial malignancy, renal disorder versus another yet unknown diagnosis.    At this time, recommend proceeding with comprehensive hematuria evaluation to include:  - Urinalysis and micro.   - Urine cytology to look for cells concerning for malignancy.  - CT urogram for upper tract imaging.  - Cystoscopy  with the first available urologist to evaluate the interior of the bladder. Follow up for hematuria as recommended by urologist performing cystoscopic evaluation.    Thank you for allowing me to participate in Ms. Car's care. I will keep you updated of her progress, but please do not hesitate to contact me with any questions.    Rosemary Hamlin PA-C  [facility]  30 minutes were spent with the patient today, > 50% in counseling and coordination of care of hematuria.

## 2018-11-21 NOTE — LETTER
11/21/2018       RE: Jael Car  [address]  [place] [address]     Dear Colleague,    Thank you for referring your patient, Jael Car, to the [facility] [place] at [facility]. Please see a copy of my visit note below.    CC: Hematuria.    HPI: It is a pleasure to see Ms. Jael Car, a 68 year old female, asked to be seen in consultation by Dr. Gardiner for evaluation of micro hematuria. This was first detected on 10/13/17 and again 10/31/18.    The patient any gross hematuria.  Ms. Car voids without difficulty, and reports nocturia of 1.  She currently denies any dysuria, pyuria, hesitancy, intermittency, feelings of incomplete emptying, or any recent history of urinary tract infections or stones.    Hematuria Risk Factors:  Age >40: Yes     Smoking history: Never  Occupational exposure to chemicals or dyes (ie, benzenes, aromatic amines): no  History of urologic disorder or disease: no  History of irritative voiding symptoms: no  History of urinary tract infection: no  Analgesic abuse: no  History of pelvic irradiation: no    Past Medical History:   Diagnosis Date     Colon polyps 12/2017    tubular adenoma x 1 - 3mm - due 5 yrs     DVT (deep venous thrombosis) (H) 2011    PE - postop     Elevated serum protein level     Protein S     History of thrombophlebitis      Hyperlipidemia LDL goal < 130 2006     Microscopic hematuria      MTHFR mutation (H)      Mumps      Osteoarthrosis of knee 2014    Dr Augustine     Osteopenia      Pulmonary emboli (H) 2011    Post op     Subclinical hyperthyroidism 2014     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       atorvastatin (LIPITOR) 20 MG tablet Take 1 tablet (20 mg) by mouth daily 90 tablet 3     balsalazide (COLAZAL) 750 MG capsule Take 4 capsules (3,000 mg) by mouth 2 times daily       Calcium-Phosphorus-Vitamin D (CALCIUM GUMMIES) 250-100-500 MG-MG-UNIT CHEW Take by mouth 2 times daily       cholecalciferol (VITAMIN D3) 5000 UNITS CAPS capsule Take by mouth every other day       multivitamin  peds with iron (FLINTSTONES COMPLETE) 60 MG chewable tablet Take 1 chew tab by mouth daily.       No Known Allergies  FAMILY HISTORY: There is no reported history of genitourinary carcinoma.  There is no history of urolithiasis.      SOCIAL HISTORY: The patient does not smoke cigarettes. Denies EtOH and illicit drug abuse.      ROS: A comprehensive 14 point ROS was obtained and negative except for that outlined above in the HPI.    PHYSICAL EXAM:   Vitals:    11/21/18 1535   Pulse: 112   SpO2: 98%   Weight: 74.8 kg (165 lb)   Height: 1.499 m (4' 11\")   GEN: NAD  EYES: EOMI  MOUTH: MMM  NECK: Supple  RESP: Unlabored breathing  CARDIAC: No LE edema  SKIN: Warm  ABD: soft  NEURO: AAO    Office Visit on 10/31/2018   Component Date Value Ref Range Status     Sodium 10/31/2018 143  133 - 144 mmol/L Final     Potassium 10/31/2018 3.7  3.4 - 5.3 mmol/L Final     Chloride 10/31/2018 105  94 - 109 mmol/L Final     Carbon Dioxide 10/31/2018 27  20 - 32 mmol/L Final     Anion Gap 10/31/2018 11  3 - 14 mmol/L Final     Glucose 10/31/2018 104* 70 - 99 mg/dL Final    Fasting specimen     Urea Nitrogen 10/31/2018 13  7 - 30 mg/dL Final     Creatinine 10/31/2018 0.64  0.52 - 1.04 mg/dL Final     GFR Estimate 10/31/2018 >90  >60 mL/min/1.7m2 Final    Non  GFR Calc     GFR Estimate If Black 10/31/2018 >90  >60 mL/min/1.7m2 Final    African American GFR Calc     " Calcium 10/31/2018 9.5  8.5 - 10.1 mg/dL Final     Bilirubin Total 10/31/2018 0.2  0.2 - 1.3 mg/dL Final     Albumin 10/31/2018 3.8  3.4 - 5.0 g/dL Final     Protein Total 10/31/2018 7.7  6.8 - 8.8 g/dL Final     Alkaline Phosphatase 10/31/2018 122  40 - 150 U/L Final     ALT 10/31/2018 23  0 - 50 U/L Final     AST 10/31/2018 25  0 - 45 U/L Final     Cholesterol 10/31/2018 152  <200 mg/dL Final     Triglycerides 10/31/2018 195* <150 mg/dL Final    Comment: Borderline high:  150-199 mg/dl  High:             200-499 mg/dl  Very high:       >499 mg/dl  Fasting specimen       HDL Cholesterol 10/31/2018 43* >49 mg/dL Final     LDL Cholesterol Calculated 10/31/2018 70  <100 mg/dL Final    Desirable:       <100 mg/dl     Non HDL Cholesterol 10/31/2018 109  <130 mg/dL Final     CK Total 10/31/2018 35  30 - 225 U/L Final     WBC 10/31/2018 6.3  4.0 - 11.0 10e9/L Final     RBC Count 10/31/2018 5.00  3.8 - 5.2 10e12/L Final     Hemoglobin 10/31/2018 13.6  11.7 - 15.7 g/dL Final     Hematocrit 10/31/2018 42.1  35.0 - 47.0 % Final     MCV 10/31/2018 84  78 - 100 fl Final     MCH 10/31/2018 27.2  26.5 - 33.0 pg Final     MCHC 10/31/2018 32.3  31.5 - 36.5 g/dL Final     RDW 10/31/2018 13.5  10.0 - 15.0 % Final     Platelet Count 10/31/2018 311  150 - 450 10e9/L Final     TSH 10/31/2018 0.30* 0.40 - 4.00 mU/L Final     Hemoglobin A1C 10/31/2018 5.7* 0 - 5.6 % Final    Comment: Normal <5.7% Prediabetes 5.7-6.4%  Diabetes 6.5% or higher - adopted from ADA   consensus guidelines.       Creatinine Urine 10/31/2018 181  mg/dL Final     Albumin Urine mg/L 10/31/2018 55  mg/L Final     Albumin Urine mg/g Cr 10/31/2018 30.17* 0 - 25 mg/g Cr Final     Color Urine 10/31/2018 Yellow   Final     Appearance Urine 10/31/2018 Clear   Final     Glucose Urine 10/31/2018 Negative  NEG^Negative mg/dL Final     Bilirubin Urine 10/31/2018 Negative  NEG^Negative Final     Ketones Urine 10/31/2018 Negative  NEG^Negative mg/dL Final     Specific Gravity  Urine 10/31/2018 1.020  1.003 - 1.035 Final     Blood Urine 10/31/2018 Moderate* NEG^Negative Final     pH Urine 10/31/2018 7.0  5.0 - 7.0 pH Final     Protein Albumin Urine 10/31/2018 Trace* NEG^Negative mg/dL Final     Urobilinogen Urine 10/31/2018 0.2  0.2 - 1.0 EU/dL Final     Nitrite Urine 10/31/2018 Negative  NEG^Negative Final     Leukocyte Esterase Urine 10/31/2018 Trace* NEG^Negative Final     Source 10/31/2018 Midstream Urine   Final     T4 Free 10/31/2018 1.11  0.76 - 1.46 ng/dL Final     Vitamin D Deficiency screening 10/31/2018 40  20 - 75 ug/L Final    Comment: Season, race, dietary intake, and treatment affect the concentration of   25-hydroxy-Vitamin D. Values may decrease during winter months and increase   during summer months. Values 20-29 ug/L may indicate Vitamin D insufficiency   and values <20 ug/L may indicate Vitamin D deficiency.  Vitamin D determination is routinely performed by an immunoassay specific for   25 hydroxyvitamin D3.  If an individual is on vitamin D2 (ergocalciferol)   supplementation, please specify 25 OH vitamin D2 and D3 level determination by   LCMSMS test [id number].       Sed Rate 10/31/2018 20  0 - 30 mm/h Final     CRP Inflammation 10/31/2018 9.2* 0.0 - 8.0 mg/L Final     WBC Urine 10/31/2018 0 - 5  OTO5^0 - 5 /HPF Final     RBC Urine 10/31/2018 10-25* OTO2^O - 2 /HPF Final     Squamous Epithelial /LPF Urine 10/31/2018 Few  FEW^Few /LPF Final     Bacteria Urine 10/31/2018 Few* NEG^Negative /HPF Final     Mucous Urine 10/31/2018 Present* NEG^Negative /LPF Final     Specimen Description 10/31/2018 Midstream Urine   Final     Culture Micro 10/31/2018    Final                    Value:10,000 to 50,000 colonies/mL  mixed urogenital juancho  Susceptibility testing not routinely done         IMAGING: none    ASSESSMENT and PLAN:    68 year old female with micro hematuria.  The differential diagnosis at this point includes stone disease, infection, vaginal contaminant, urothelial  malignancy, renal disorder versus another yet unknown diagnosis.    At this time, recommend proceeding with comprehensive hematuria evaluation to include:  - Urinalysis and micro.   - Urine cytology to look for cells concerning for malignancy.  - CT urogram for upper tract imaging.  - Cystoscopy with the first available urologist to evaluate the interior of the bladder. Follow up for hematuria as recommended by urologist performing cystoscopic evaluation.    Thank you for allowing me to participate in Ms. Car's care. I will keep you updated of her progress, but please do not hesitate to contact me with any questions.      oRsemary Hamlin PA-C  [facility]  30 minutes were spent with the patient today, > 50% in counseling and coordination of care of hematuria.

## 2018-11-21 NOTE — PATIENT INSTRUCTIONS
-Urinalysis and micro analysis.   - Urine cytology to look for abnormal cells.  - CT scan   - Cystoscopy with the  urologist to evaluate the interior of the bladder. Follow up as recommended by the urologist.

## 2018-11-26 LAB — COPATH REPORT: NORMAL

## 2018-12-10 ENCOUNTER — TRANSFERRED RECORDS (OUTPATIENT)
Dept: HEALTH INFORMATION MANAGEMENT | Facility: CLINIC | Age: 68
End: 2018-12-10

## 2019-01-02 ENCOUNTER — HOSPITAL ENCOUNTER (OUTPATIENT)
Dept: CT IMAGING | Facility: CLINIC | Age: 69
Discharge: HOME | End: 2019-01-02
Attending: PHYSICIAN ASSISTANT | Admitting: PHYSICIAN ASSISTANT
Payer: COMMERCIAL

## 2019-01-02 DIAGNOSIS — R31.29 MICROSCOPIC HEMATURIA: ICD-10-CM

## 2019-01-02 PROCEDURE — 25000128 H RX IP 250 OP 636: Performed by: PHYSICIAN ASSISTANT

## 2019-01-02 PROCEDURE — 74178 CT ABD&PLV WO CNTR FLWD CNTR: CPT

## 2019-01-02 RX ORDER — IOPAMIDOL 755 MG/ML
500 INJECTION, SOLUTION INTRAVASCULAR ONCE
Status: COMPLETED | OUTPATIENT
Start: 2019-01-02 | End: 2019-01-02

## 2019-01-02 RX ADMIN — IOPAMIDOL 100 ML: 755 INJECTION, SOLUTION INTRAVENOUS at 11:20

## 2019-01-07 ENCOUNTER — OFFICE VISIT (OUTPATIENT)
Dept: UROLOGY | Facility: CLINIC | Age: 69
End: 2019-01-07
Payer: COMMERCIAL

## 2019-01-07 VITALS — WEIGHT: 165 LBS | HEART RATE: 100 BPM | OXYGEN SATURATION: 94 % | BODY MASS INDEX: 33.26 KG/M2 | HEIGHT: 59 IN

## 2019-01-07 DIAGNOSIS — R31.29 MICROSCOPIC HEMATURIA: Primary | ICD-10-CM

## 2019-01-07 PROCEDURE — 52000 CYSTOURETHROSCOPY: CPT | Performed by: UROLOGY

## 2019-01-07 RX ORDER — AMOXICILLIN 400 MG/5ML
400 POWDER, FOR SUSPENSION ORAL ONCE
Qty: 5 ML | Refills: 0 | Status: SHIPPED | OUTPATIENT
Start: 2019-01-07 | End: 2019-05-08

## 2019-01-07 ASSESSMENT — PAIN SCALES - GENERAL: PAINLEVEL: NO PAIN (0)

## 2019-01-07 ASSESSMENT — MIFFLIN-ST. JEOR: SCORE: 1189.07

## 2019-01-07 NOTE — PROGRESS NOTES
Prior to the start of the procedure and with procedural staff participation, I verbally confirmed the patient s identity using two indicators, relevant allergies, that the procedure was appropriate and matched the consent or emergent situation, and that the correct equipment/implants were available. Immediately prior to starting the procedure I conducted the Time Out with the procedural staff and re-confirmed the patient s name, procedure, and site/side. (The Joint Commission universal protocol was followed.)  Yes    Sedation (Moderate or Deep): None  Pt has signed the consent form stating that we will be doing a CYSTOSCOPY (with or without stent removal) today, and that it is the correct procedure. I verbally confirmed the patient s identity using two indicators, relevant allergies, and that the correct equipment was available. Post-op information given to the pt as needed at check-out. I have sent an appropriate antibiotic to the pharmacy in our building as recommended by the MD. LYDIA Arthur CMA

## 2019-01-07 NOTE — PROGRESS NOTES
"Office Visit Note  [facility]  [phone]    Jan 7, 2019    [unfilled]    1950    UROLOGIC DIAGNOSES:    Microscopic hematuria    CURRENT INTERVENTIONS:        History:    This is a 68-year-old woman undergoing microscopic hematuria workup in urology clinic. She is asymptomatic. She has had a CT scan performed that was negative and also a urine cytology that was negative as well. She is here today for a cystoscopy to complete her hematuria workup. She is a nonsmoker      Imaging:  I reviewed her CT scan images from January 2. No abnormalities identified.    Labs:      MEDICATIONS:    Current Outpatient Medications:      aspirin EC 81 MG tablet, Take 81 mg by mouth daily., Disp: , Rfl:      atorvastatin (LIPITOR) 20 MG tablet, Take 1 tablet (20 mg) by mouth daily, Disp: 90 tablet, Rfl: 3     balsalazide (COLAZAL) 750 MG capsule, Take 4 capsules (3,000 mg) by mouth 2 times daily, Disp: , Rfl:      Calcium-Phosphorus-Vitamin D (CALCIUM GUMMIES) 250-100-500 MG-MG-UNIT CHEW, Take by mouth 2 times daily, Disp: , Rfl:      cholecalciferol (VITAMIN D3) 5000 UNITS CAPS capsule, Take by mouth every other day, Disp: , Rfl:      multivitamin  peds with iron (FLINTSTONES COMPLETE) 60 MG chewable tablet, Take 1 chew tab by mouth daily., Disp: , Rfl:     ALLERGIES:   No Known Allergies    REVIEW OF SYSTEMS: Ten point review of systems without change as outlined in HPI    SURGICAL HISTORY:    Past Surgical History:   Procedure Laterality Date     COLONOSCOPY  09, 9/15, 12/17    colitis, tubular adenoma x 1 - 3mm - diverticulosis - due 5 yrs     FLEXIBLE SIGMOIDOSCOPY  12/2018    benign, quiscient UC     SIGMOIDOSCOPY FLEXIBLE  09/2017    rectosigmoiditis     SURGICAL HISTORY OF -  Right 2011    Rt Rotator Cuff         PHYSICAL EXAM:    Pulse 100   Ht 1.499 m (4' 11\")   Wt 74.8 kg (165 lb)   LMP 06/03/2004   SpO2 94%   BMI 33.33 kg/m      Constitutional: Well developed. Conversant and in no acute " distress  Eyes: Anicteric sclera, conjunctiva clear, normal extraocular movements  ENT: Normocephalic and atraumatic,   Skin: Warm and dry. No rashes or lesions  Cardiac: No peripheral edema  Back/Flank: Not done  CNS/PNS: Normal musculature and movements, moves all extremities normally  Respiratory: Normal non-labored breathing  Abdomen: Soft nontender and nondistended  Peripheral Vascular: No peripheral edema  Mental Status/Psych: Alert and Oriented x 3. Normal mood and affect      External Genitalia: normal  Bladder: normal  Urethra: normal  Vagina: nnormal vaginal mucosa    Cystoscopy:  I performed flexible cystoscopy today and bladder was normal throughout. No tumors identified throughout the bladder. Each ureteral orifice in its normal anatomic location.      Urinalysis:  UA RESULTS:  Recent Labs   Lab Test 11/21/18  1631 11/21/18  1544   COLOR  --  Yellow   APPEARANCE  --  Clear   URINEGLC  --  Negative   URINEBILI  --  Negative   URINEKETONE  --  Negative   SG  --  1.015   UBLD  --  Moderate*   URINEPH  --  6.0   PROTEIN  --  Negative   UROBILINOGEN  --  0.2   NITRITE  --  Negative   LEUKEST  --  Trace*   RBCU 15*  --    WBCU 2  --          IMPRESSION:    Microscopic hematuria, negative workup    PLAN:    She has had a negative microscopic hematuria workup complete with a cystoscopy, CT scan, and cytology. She was provided reassurance. She will only need to repeat her workup in the future if she should develop any gross hematuria or worsening hematuria.    Total Time:  15 minutes  Time in Consultation: 10 minutes      Juan Redmond M.D.

## 2019-01-07 NOTE — LETTER
1/7/2019       RE: Jael Car  [address]  [place] [address]     Dear Colleague,    Thank you for referring your patient, Jael Car, to the [facility]OLOG[facility] [place] at [facility]. Please see a copy of my visit note below.    Office Visit Note  [facility]  [phone]    Jan 7, 2019    [unfilled]    1950    UROLOGIC DIAGNOSES:    Microscopic hematuria    CURRENT INTERVENTIONS:      History:    This is a 68-year-old woman undergoing microscopic hematuria workup in urology clinic. She is asymptomatic. She has had a CT scan performed that was negative and also a urine cytology that was negative as well. She is here today for a cystoscopy to complete her hematuria workup. She is a nonsmoker    Imaging:  I reviewed her CT scan images from January 2. No abnormalities identified.    Labs:      MEDICATIONS:    Current Outpatient Medications:      aspirin EC 81 MG tablet, Take 81 mg by mouth daily., Disp: , Rfl:      atorvastatin (LIPITOR) 20 MG tablet, Take 1 tablet (20 mg) by mouth daily, Disp: 90 tablet, Rfl: 3     balsalazide (COLAZAL) 750 MG capsule, Take 4 capsules (3,000 mg) by mouth 2 times daily, Disp: , Rfl:      Calcium-Phosphorus-Vitamin D (CALCIUM GUMMIES) 250-100-500 MG-MG-UNIT CHEW, Take by mouth 2 times daily, Disp: , Rfl:      cholecalciferol (VITAMIN D3) 5000 UNITS CAPS capsule, Take by mouth every other day, Disp: , Rfl:      multivitamin  peds with iron (FLINTSTONES COMPLETE) 60 MG chewable tablet, Take 1 chew tab by mouth daily., Disp: , Rfl:     ALLERGIES:   No Known Allergies    REVIEW OF SYSTEMS: Ten point review of systems without change as outlined in HPI    SURGICAL HISTORY:    Past Surgical History:   Procedure Laterality Date     COLONOSCOPY  09, 9/15, 12/17    colitis, tubular adenoma x 1 - 3mm - diverticulosis - due 5 yrs     FLEXIBLE SIGMOIDOSCOPY  12/2018    benign, quiscient UC      "SIGMOIDOSCOPY FLEXIBLE  09/2017    rectosigmoiditis     SURGICAL HISTORY OF -  Right 2011    Rt Rotator Cuff     PHYSICAL EXAM:    Pulse 100   Ht 1.499 m (4' 11\")   Wt 74.8 kg (165 lb)   LMP 06/03/2004   SpO2 94%   BMI 33.33 kg/m       Constitutional: Well developed. Conversant and in no acute distress  Eyes: Anicteric sclera, conjunctiva clear, normal extraocular movements  ENT: Normocephalic and atraumatic,   Skin: Warm and dry. No rashes or lesions  Cardiac: No peripheral edema  Back/Flank: Not done  CNS/PNS: Normal musculature and movements, moves all extremities normally  Respiratory: Normal non-labored breathing  Abdomen: Soft nontender and nondistended  Peripheral Vascular: No peripheral edema  Mental Status/Psych: Alert and Oriented x 3. Normal mood and affect    External Genitalia: normal  Bladder: normal  Urethra: normal  Vagina: nnormal vaginal mucosa    Cystoscopy:  I performed flexible cystoscopy today and bladder was normal throughout. No tumors identified throughout the bladder. Each ureteral orifice in its normal anatomic location.      Urinalysis:  UA RESULTS:  Recent Labs   Lab Test 11/21/18  1631 11/21/18  1544   COLOR  --  Yellow   APPEARANCE  --  Clear   URINEGLC  --  Negative   URINEBILI  --  Negative   URINEKETONE  --  Negative   SG  --  1.015   UBLD  --  Moderate*   URINEPH  --  6.0   PROTEIN  --  Negative   UROBILINOGEN  --  0.2   NITRITE  --  Negative   LEUKEST  --  Trace*   RBCU 15*  --    WBCU 2  --      IMPRESSION:    Microscopic hematuria, negative workup    PLAN:    She has had a negative microscopic hematuria workup complete with a cystoscopy, CT scan, and cytology. She was provided reassurance. She will only need to repeat her workup in the future if she should develop any gross hematuria or worsening hematuria.    Total Time:  15 minutes  Time in Consultation: 10 minutes      Juan Redmond M.D.    Prior to the start of the procedure and with procedural staff " participation, I verbally confirmed the patient s identity using two indicators, relevant allergies, that the procedure was appropriate and matched the consent or emergent situation, and that the correct equipment/implants were available. Immediately prior to starting the procedure I conducted the Time Out with the procedural staff and re-confirmed the patient s name, procedure, and site/side. (The Joint Commission universal protocol was followed.)  Yes    Sedation (Moderate or Deep): None  Pt has signed the consent form stating that we will be doing a CYSTOSCOPY (with or without stent removal) today, and that it is the correct procedure. I verbally confirmed the patient s identity using two indicators, relevant allergies, and that the correct equipment was available. Post-op information given to the pt as needed at check-out. I have sent an appropriate antibiotic to the pharmacy in our building as recommended by the MD. LYDIA Arthur CMA    Again, thank you for allowing me to participate in the care of your patient.      Sincerely,    Juan Redmond MD

## 2019-05-08 ENCOUNTER — OFFICE VISIT (OUTPATIENT)
Dept: FAMILY MEDICINE | Facility: CLINIC | Age: 69
End: 2019-05-08
Payer: COMMERCIAL

## 2019-05-08 VITALS
DIASTOLIC BLOOD PRESSURE: 92 MMHG | WEIGHT: 165 LBS | HEART RATE: 102 BPM | SYSTOLIC BLOOD PRESSURE: 166 MMHG | HEIGHT: 59 IN | BODY MASS INDEX: 33.26 KG/M2 | OXYGEN SATURATION: 97 % | TEMPERATURE: 98.4 F

## 2019-05-08 DIAGNOSIS — R31.29 MICROSCOPIC HEMATURIA: ICD-10-CM

## 2019-05-08 DIAGNOSIS — R03.0 ELEVATED BLOOD PRESSURE READING WITHOUT DIAGNOSIS OF HYPERTENSION: ICD-10-CM

## 2019-05-08 DIAGNOSIS — E05.90 SUBCLINICAL HYPERTHYROIDISM: ICD-10-CM

## 2019-05-08 DIAGNOSIS — E66.811 CLASS 1 OBESITY WITH SERIOUS COMORBIDITY AND BODY MASS INDEX (BMI) OF 33.0 TO 33.9 IN ADULT, UNSPECIFIED OBESITY TYPE: ICD-10-CM

## 2019-05-08 DIAGNOSIS — K51.30 ULCERATIVE RECTOSIGMOIDITIS WITHOUT COMPLICATION (H): ICD-10-CM

## 2019-05-08 DIAGNOSIS — Z51.81 MEDICATION MONITORING ENCOUNTER: ICD-10-CM

## 2019-05-08 DIAGNOSIS — Z86.718 HISTORY OF DEEP VENOUS THROMBOSIS: ICD-10-CM

## 2019-05-08 DIAGNOSIS — Z86.711 HISTORY OF PULMONARY EMBOLISM: ICD-10-CM

## 2019-05-08 DIAGNOSIS — Z15.89 MTHFR MUTATION: ICD-10-CM

## 2019-05-08 DIAGNOSIS — E78.5 HYPERLIPIDEMIA LDL GOAL <130: Primary | ICD-10-CM

## 2019-05-08 LAB
ERYTHROCYTE [DISTWIDTH] IN BLOOD BY AUTOMATED COUNT: 14 % (ref 10–15)
HCT VFR BLD AUTO: 39.6 % (ref 35–47)
HGB BLD-MCNC: 13.1 G/DL (ref 11.7–15.7)
MCH RBC QN AUTO: 27.6 PG (ref 26.5–33)
MCHC RBC AUTO-ENTMCNC: 33.1 G/DL (ref 31.5–36.5)
MCV RBC AUTO: 83 FL (ref 78–100)
PLATELET # BLD AUTO: 242 10E9/L (ref 150–450)
RBC # BLD AUTO: 4.75 10E12/L (ref 3.8–5.2)
WBC # BLD AUTO: 6 10E9/L (ref 4–11)

## 2019-05-08 PROCEDURE — 82550 ASSAY OF CK (CPK): CPT | Performed by: FAMILY MEDICINE

## 2019-05-08 PROCEDURE — 84439 ASSAY OF FREE THYROXINE: CPT | Performed by: FAMILY MEDICINE

## 2019-05-08 PROCEDURE — 84443 ASSAY THYROID STIM HORMONE: CPT | Performed by: FAMILY MEDICINE

## 2019-05-08 PROCEDURE — 99214 OFFICE O/P EST MOD 30 MIN: CPT | Performed by: FAMILY MEDICINE

## 2019-05-08 PROCEDURE — 80053 COMPREHEN METABOLIC PANEL: CPT | Performed by: FAMILY MEDICINE

## 2019-05-08 PROCEDURE — 85027 COMPLETE CBC AUTOMATED: CPT | Performed by: FAMILY MEDICINE

## 2019-05-08 PROCEDURE — 80061 LIPID PANEL: CPT | Performed by: FAMILY MEDICINE

## 2019-05-08 PROCEDURE — 36415 COLL VENOUS BLD VENIPUNCTURE: CPT | Performed by: FAMILY MEDICINE

## 2019-05-08 ASSESSMENT — MIFFLIN-ST. JEOR: SCORE: 1184.07

## 2019-05-08 NOTE — PROGRESS NOTES
SUBJECTIVE:   Jael Car is a 69 year old female who presents to clinic today for the following   health issues:    Hyperlipidemia Follow-Up  Patient's hyperlipidemia is moderately controlled. Patient is currently prescribed 20 mg Atorvastatin daily for hyperlipidemia management.    Rate your low fat/cholesterol diet?: fair    Taking statin?  Yes, no muscle aches from statin    Other lipid medications/supplements?:  none    Amount of exercise or physical activity: 2-3 days/week for an average of 30-45 minutes    Problems taking medications regularly: No    Medication side effects: none    Diet: regular (no restrictions)    Recent Labs   Lab Test 10/31/18  1103 10/13/17  0856  08/14/15  0913 07/30/14  0828   CHOL 152 184   < > 156 161   HDL 43* 60   < > 55 47*   LDL 70 92   < > 78 87   TRIG 195* 160*   < > 116 137   CHOLHDLRATIO  --   --   --  2.8 3.4    < > = values in this interval not displayed.     Ulcerative Colitis: Stable. Patient is currently prescribed 3000 mg Colazal BID for ulcerative colitis management.     Hx of DVT/PE/MTHFR mutation: Stable. No issues.     Hematuria: Patient was seen by urology and results were normal. No issues.     Elevated Blood Pressure: Patient presented today as hypertensive with a BP of 172/96. Patient's blood pressure was also elevated on 10/31/2019 at 154/92.     BP Readings from Last 5 Encounters:   05/08/19 (!) 166/92   10/31/18 (!) 154/92   11/20/17 132/84   05/13/17 134/76   11/25/16 138/84     Additional history: as documented    body mass index is 33.33 kg/m .    Wt Readings from Last 4 Encounters:   05/08/19 74.8 kg (165 lb)   01/07/19 74.8 kg (165 lb)   11/21/18 74.8 kg (165 lb)   10/31/18 74.8 kg (165 lb)       Health Maintenance    Health Maintenance Due   Topic Date Due     DTAP/TDAP/TD IMMUNIZATION (1 - Tdap) 04/29/1976     ZOSTER IMMUNIZATION (2 of 3) 09/19/2013     PNEUMOCOCCAL IMMUNIZATION 65+ LOW/MEDIUM RISK (1 of 2 - PCV13) 04/29/2016     FIT Q1 YR   10/29/2018     PHQ-2  01/01/2019       Current Problem List    Patient Active Problem List   Diagnosis     Chondromalacia of patella     Hyperlipidemia LDL goal <130     Lumbago     S/P shoulder surgery     Advanced directives, counseling/discussion     Rotator cuff (capsule) sprain     Osteopenia     Carpal tunnel syndrome     Hyperglycemia     Subclinical hyperthyroidism     Osteoarthrosis of knee     History of pulmonary embolism     Primary osteoarthritis of right knee     MTHFR mutation (H)     History of deep venous thrombosis     Elevated serum protein level     Ulcerative rectosigmoiditis 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 (H)     Microscopic hematuria     Colon polyps     Primary osteoarthritis involving multiple joints     Class 1 obesity with serious comorbidity and body mass index (BMI) of 33.0 to 33.9 in adult, unspecified obesity type       Past Medical History    Past Medical History:   Diagnosis Date     Colon polyps 12/2017    tubular adenoma x 1 - 3mm - due 5 yrs     DVT (deep venous thrombosis) (H) 2011    PE - postop     Elevated serum protein level     Protein S     History of thrombophlebitis      Hyperlipidemia LDL goal < 130 2006     Microscopic hematuria 2019    work up negative - Dr Redmond     MTHFR mutation (H)      Mumps      Osteoarthrosis of knee 2014    Dr Augustine     Osteopenia      Pulmonary emboli (H) 2011    Post op     Pulmonary embolism and infarction (H) 10/5/2011     Problem list name updated by automated process. 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    [facility] - Dr Telles/Paco - left sided       Past Surgical History    Past Surgical History:   Procedure Laterality Date     COLONOSCOPY  09, 9/15, 12/17    colitis, tubular adenoma x 1 - 3mm - diverticulosis - due 5 yrs     FLEXIBLE SIGMOIDOSCOPY  12/2018    benign, quiscient UC     SIGMOIDOSCOPY FLEXIBLE  09/2017    rectosigmoiditis     SURGICAL HISTORY OF -   Right 2011    Rt Rotator Cuff       Current Medications    Current Outpatient Medications   Medication Sig Dispense Refill     aspirin EC 81 MG tablet Take 81 mg by mouth daily.       atorvastatin (LIPITOR) 20 MG tablet Take 1 tablet (20 mg) by mouth daily 90 tablet 3     balsalazide (COLAZAL) 750 MG capsule Take 4 capsules (3,000 mg) by mouth 2 times daily       Calcium-Phosphorus-Vitamin D (CALCIUM GUMMIES) 250-100-500 MG-MG-UNIT CHEW Take by mouth 2 times daily       cholecalciferol (VITAMIN D3) 5000 UNITS CAPS capsule Take by mouth every other day       multivitamin  peds with iron (FLINTSTONES COMPLETE) 60 MG chewable tablet Take 1 chew tab by mouth daily.         Allergies    No Known Allergies    Immunizations    Immunization History   Administered Date(s) Administered     Zoster vaccine, live 07/25/2013       Family History    Family History   Problem Relation Age of Onset     Alcohol/Drug Father      Lipids Father         high cholesterol     Diabetes Paternal Grandmother      Diabetes Son         diet controlled     Lung Cancer Maternal Grandfather         smoker     Coronary Artery Disease Brother        Social History    Social History     Socioeconomic History     Marital status:      Spouse name: Anand     Number of children: 1     Years of education: 12     Highest education level: Not on file   Occupational History     Not on file   Social Needs     Financial resource strain: Not on file     Food insecurity:     Worry: Not on file     Inability: Not on file     Transportation needs:     Medical: Not on file     Non-medical: Not on file   Tobacco Use     Smoking status: Never Smoker     Smokeless tobacco: Never Used     Tobacco comment: 30 years ago   Substance and Sexual Activity     Alcohol use: Yes     Alcohol/week: 1.2 - 2.4 oz     Types: 2 - 4 Standard drinks or equivalent per week     Comment: 1-2 drinks about 2 times/week     Drug use: No     Sexual activity: Never     Partners: Male      "Birth control/protection: Pill, None   Lifestyle     Physical activity:     Days per week: Not on file     Minutes per session: Not on file     Stress: Not on file   Relationships     Social connections:     Talks on phone: Not on file     Gets together: Not on file     Attends [employer] service: Not on file     Active member of club or organization: Not on file     Attends meetings of clubs or organizations: Not on file     Relationship status: Not on file     Intimate partner violence:     Fear of current or ex partner: Not on file     Emotionally abused: Not on file     Physically abused: Not on file     Forced sexual activity: Not on file   Other Topics Concern     Parent/sibling w/ CABG, MI or angioplasty before 65F 55M? No      Service Not Asked     Blood Transfusions Not Asked     Caffeine Concern Yes     Comment: 2-4 cans per week     Occupational Exposure Not Asked     Hobby Hazards Not Asked     Sleep Concern Not Asked     Stress Concern Not Asked     Weight Concern Not Asked     Special Diet Not Asked     Back Care Not Asked     Exercise Yes     Comment: 6,000 to 9,000 steps daily     Bike Helmet Not Asked     Seat Belt Yes     Self-Exams Not Asked   Social History Narrative     Not on file       All above reviewed and updated, all stable unless otherwise noted    Recent labs reviewed    ROS:  Constitutional, HEENT, cardiovascular, pulmonary, GI, , musculoskeletal, neuro, skin, endocrine and psych systems are negative, except as otherwise noted.    OBJECTIVE:                                                    BP (!) 166/92   Pulse 102   Temp 98.4  F (36.9  C) (Oral)   Ht 1.499 m (4' 11\")   Wt 74.8 kg (165 lb)   LMP 06/03/2004   SpO2 97%   BMI 33.33 kg/m    Body mass index is 33.33 kg/m .  GENERAL: healthy, alert and no distress  EYES: Eyes grossly normal to inspection  HENT:ear canals and TM's normal upon viewing with otoscope, nose and mouth without ulcers or lesions upon viewing with " otoscope, mild bilateral cerumen impaction greater in right than left  NECK: no tenderness, no adenopathy, no asymmetry, no masses, no stiffness; thyroid- normal to palpation  RESP: lungs clear to auscultation - no rales, no rhonchi, no wheezes  CV: regular rates and rhythm, normal S1 S2, no S3 or S4 and no murmur, no click or rub -  ABDOMEN: soft, no tenderness, no  hepatosplenomegaly, no masses, normal bowel sounds  MS: extremities- no gross deformities noted, no edema  SKIN: no suspicious lesions, no rashes  NEURO: strength and tone- normal, sensory exam- grossly normal, mentation- intact, speech- normal  BACK: no CVA tenderness, no paralumbar tenderness  PSYCH: Alert and oriented times 3; speech- coherent , normal rate and volume; able to articulate logical thoughts, able to abstract reason, no tangential thoughts, no hallucinations or delusions, affect- normal    DIAGNOSTICS/PROCEDURES:                                                      No results found for this or any previous visit (from the past 24 hour(s)).     Labs Pending     ASSESSMENT/PLAN:                                                        ICD-10-CM    1. Hyperlipidemia LDL goal <130 E78.5 Comprehensive metabolic panel     CK total     Lipid panel reflex to direct LDL Fasting   2. Elevated blood pressure reading without diagnosis of hypertension R03.0    3. Microscopic hematuria R31.29    4. Ulcerative rectosigmoiditis 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 (H) K51.30 Comprehensive metabolic panel     CBC with platelets   5. MTHFR mutation (H) E72.12    6. History of pulmonary embolism Z86.711    7. History of deep venous thrombosis Z86.718    8. Subclinical hyperthyroidism E05.90 TSH with free T4 reflex     T4, free   9. Class 1 obesity with serious comorbidity and body mass index (BMI) of 33.0 to 33.9 in adult, unspecified obesity type E66.9     Z68.33    10. Medication monitoring encounter Z51.81 Comprehensive metabolic panel     CK total     Lipid panel reflex  to direct LDL Fasting     CBC with platelets     TSH with free T4 reflex     T4, free     Discussed treatment/modality options, including risk and benefits, she desires advised aspirin 81 mg po daily, advised 1 multivitamin per day, advised calcium 5165-8399 mg/d and Vitamin D 800-1200 IU/d, advised dentist every 6 months, advised diet and exercise and advised opthalmologist every 1-2 years. All diagnosis above reviewed and noted above, otherwise stable.  See [facility] orders for further details.  Follow up as needed.    1) Patient's hyperlipidemia is moderately controlled. Patient is currently prescribed 20 mg Atorvastatin daily for hyperlipidemia management. Advised continued use.     2) Patient is currently prescribed 3000 mg Colazal BID for ulcerative colitis management. Advised continued use.     3) Prescriptions refilled today.    4) Recommend Shingrix vaccine. Recommend Pneumonia vaccines. Recommend Tdap Tetanus booster.     5) Recommend low salt diet, regular exercise and weight loss for hypertension management. Follow up in 1-2 weeks for blood pressure recheck visit with nurse/pharmacy.     6) Follow up in 6 months for medication recheck visit.     Health Maintenance Due   Topic Date Due     DTAP/TDAP/TD IMMUNIZATION (1 - Tdap) 04/29/1976     ZOSTER IMMUNIZATION (2 of 3) 09/19/2013     PNEUMOCOCCAL IMMUNIZATION 65+ LOW/MEDIUM RISK (1 of 2 - PCV13) 04/29/2016     FIT Q1 YR  10/29/2018     PHQ-2  01/01/2019     This document serves as a record of the services and decisions personally performed and made by Ken Gardiner MD. It was created on his behalf by Hussein Bliss, a trained medical scribe. The creation of this document is based on the provider's statements to the medical scribe.  Hussein Bliss May 8, 2019 10:07 AM     The information in this document, created by the medical scribe for me, accurately reflects the services I personally performed and the decisions made by me. I have reviewed and approved this document  for accuracy prior to leaving the patient care area.  May 8, 2019            Ken Gardiner MD [address]  [address] [phone] Fax

## 2019-05-08 NOTE — PATIENT INSTRUCTIONS
Recommend Shingrix vaccine for shingles. Check with insurance to see where the Shingrix vaccine is the cheapest. Follow up 2-6 months after receiving the first shot for the second shot.    [facility] [place]                        To reach your care team during and after hours:   [phone]  To reach our pharmacy:        [phone]    Clinic Hours                        Our clinic hours are:    Monday   7:30 am to 7:00 pm                  Tuesday through Friday 7:30 am to 5:00 pm                             Saturday   8:00 am to 12:00 pm      Sunday   Closed      Pharmacy Hours                        Our pharmacy hours are:    Monday   8:30 am to 7:00 pm       Tuesday to Friday  8:30 am to 6:00 pm                       Saturday    9:00 am to 1:00 pm              Sunday    Closed              There is also information available at our web site:  [email or web address]    If your provider ordered any lab tests and you do not receive the results within 10 business days, please call the clinic.    If you need a medication refill please contact your pharmacy.  Please allow 2-3 business days for your refill to be completed.    Our clinic offers telephone visits and e visits.  Please ask one of your team members to explain more.      Use "[employer]t (secure email communication and access to your chart) to send your primary care provider a message or make an appointment. Ask someone on your Team how to sign up for [employer].  Immunizations                      Immunization History   Administered Date(s) Administered     Zoster vaccine, live 07/25/2013        Health Maintenance                         Health Maintenance Due   Topic Date Due     Diptheria Tetanus Pertussis (DTAP/TDAP/TD) Vaccine (1 - Tdap) 04/29/1976     Zoster (Shingles) Vaccine (2 of 3) 09/19/2013     Pneumococcal Vaccine (1 of 2 - PCV13) 04/29/2016     Colon Cancer Screening - FIT Test - yearly  10/29/2018     PHQ-2  01/01/2019

## 2019-05-09 LAB
ALBUMIN SERPL-MCNC: 3.5 G/DL (ref 3.4–5)
ALP SERPL-CCNC: 136 U/L (ref 40–150)
ALT SERPL W P-5'-P-CCNC: 18 U/L (ref 0–50)
ANION GAP SERPL CALCULATED.3IONS-SCNC: 9 MMOL/L (ref 3–14)
AST SERPL W P-5'-P-CCNC: 17 U/L (ref 0–45)
BILIRUB SERPL-MCNC: 0.4 MG/DL (ref 0.2–1.3)
BUN SERPL-MCNC: 11 MG/DL (ref 7–30)
CALCIUM SERPL-MCNC: 9 MG/DL (ref 8.5–10.1)
CHLORIDE SERPL-SCNC: 108 MMOL/L (ref 94–109)
CHOLEST SERPL-MCNC: 177 MG/DL
CK SERPL-CCNC: 57 U/L (ref 30–225)
CO2 SERPL-SCNC: 27 MMOL/L (ref 20–32)
CREAT SERPL-MCNC: 0.64 MG/DL (ref 0.52–1.04)
GFR SERPL CREATININE-BSD FRML MDRD: >90 ML/MIN/{1.73_M2}
GLUCOSE SERPL-MCNC: 120 MG/DL (ref 70–99)
HDLC SERPL-MCNC: 46 MG/DL
LDLC SERPL CALC-MCNC: 96 MG/DL
NONHDLC SERPL-MCNC: 131 MG/DL
POTASSIUM SERPL-SCNC: 3.7 MMOL/L (ref 3.4–5.3)
PROT SERPL-MCNC: 7.2 G/DL (ref 6.8–8.8)
SODIUM SERPL-SCNC: 144 MMOL/L (ref 133–144)
T4 FREE SERPL-MCNC: 1.04 NG/DL (ref 0.76–1.46)
TRIGL SERPL-MCNC: 177 MG/DL
TSH SERPL DL<=0.005 MIU/L-ACNC: 0.23 MU/L (ref 0.4–4)

## 2019-05-31 ENCOUNTER — ALLIED HEALTH/NURSE VISIT (OUTPATIENT)
Dept: FAMILY MEDICINE | Facility: CLINIC | Age: 69
End: 2019-05-31
Payer: COMMERCIAL

## 2019-05-31 VITALS — SYSTOLIC BLOOD PRESSURE: 132 MMHG | DIASTOLIC BLOOD PRESSURE: 84 MMHG

## 2019-05-31 DIAGNOSIS — Z01.30 BLOOD PRESSURE CHECK: Primary | ICD-10-CM

## 2019-05-31 PROCEDURE — 99207 ZZC NO CHARGE NURSE ONLY: CPT | Performed by: FAMILY MEDICINE

## 2019-05-31 NOTE — PROGRESS NOTES
Jael Car was evaluated at [facility] on May 31, 2019 at which time her blood pressure was:    BP Readings from Last 3 Encounters:   05/31/19 132/84   05/08/19 (!) 166/92   10/31/18 (!) 154/92     Pulse Readings from Last 3 Encounters:   05/08/19 102   01/07/19 100   11/21/18 112       Reviewed lifestyle modifications for blood pressure control and reduction: including making healthy food choices, managing weight, getting regular exercise, smoking cessation, reducing alcohol consumption, monitoring blood pressure regularly.     Symptoms: None    BP Goal:< 140/90 mmHg    BP Assessment:  BP at goal    Potential Reasons for BP too high: NA - Not applicable    BP Follow-Up Plan: Recheck BP in 6 months at pharmacy    Recommendation to Provider: No change    Note completed by: Thank you,  Ally Redding RPh, Mgr [facility] [place] [phone]'

## 2019-08-14 ENCOUNTER — OFFICE VISIT (OUTPATIENT)
Dept: FAMILY MEDICINE | Facility: CLINIC | Age: 69
End: 2019-08-14
Payer: COMMERCIAL

## 2019-08-14 VITALS
OXYGEN SATURATION: 97 % | WEIGHT: 164.4 LBS | TEMPERATURE: 98.4 F | BODY MASS INDEX: 33.14 KG/M2 | HEIGHT: 59 IN | SYSTOLIC BLOOD PRESSURE: 136 MMHG | DIASTOLIC BLOOD PRESSURE: 82 MMHG | HEART RATE: 97 BPM

## 2019-08-14 DIAGNOSIS — R31.29 MICROSCOPIC HEMATURIA: ICD-10-CM

## 2019-08-14 DIAGNOSIS — E78.5 HYPERLIPIDEMIA LDL GOAL <130: ICD-10-CM

## 2019-08-14 DIAGNOSIS — Z86.711 HISTORY OF PULMONARY EMBOLISM: ICD-10-CM

## 2019-08-14 DIAGNOSIS — Z51.81 MEDICATION MONITORING ENCOUNTER: ICD-10-CM

## 2019-08-14 DIAGNOSIS — Z86.718 HISTORY OF DEEP VENOUS THROMBOSIS: ICD-10-CM

## 2019-08-14 DIAGNOSIS — Z01.30 BLOOD PRESSURE CHECK: Primary | ICD-10-CM

## 2019-08-14 DIAGNOSIS — E66.811 CLASS 1 OBESITY WITH SERIOUS COMORBIDITY AND BODY MASS INDEX (BMI) OF 33.0 TO 33.9 IN ADULT, UNSPECIFIED OBESITY TYPE: ICD-10-CM

## 2019-08-14 DIAGNOSIS — R73.09 ABNORMAL GLUCOSE: ICD-10-CM

## 2019-08-14 DIAGNOSIS — Z15.89 MTHFR MUTATION: ICD-10-CM

## 2019-08-14 DIAGNOSIS — E05.90 SUBCLINICAL HYPERTHYROIDISM: ICD-10-CM

## 2019-08-14 DIAGNOSIS — K51.30 ULCERATIVE RECTOSIGMOIDITIS WITHOUT COMPLICATION (H): ICD-10-CM

## 2019-08-14 LAB — HBA1C MFR BLD: 5.6 % (ref 0–5.6)

## 2019-08-14 PROCEDURE — 36415 COLL VENOUS BLD VENIPUNCTURE: CPT | Performed by: FAMILY MEDICINE

## 2019-08-14 PROCEDURE — 80061 LIPID PANEL: CPT | Performed by: FAMILY MEDICINE

## 2019-08-14 PROCEDURE — 82550 ASSAY OF CK (CPK): CPT | Performed by: FAMILY MEDICINE

## 2019-08-14 PROCEDURE — 99214 OFFICE O/P EST MOD 30 MIN: CPT | Performed by: FAMILY MEDICINE

## 2019-08-14 PROCEDURE — 80053 COMPREHEN METABOLIC PANEL: CPT | Performed by: FAMILY MEDICINE

## 2019-08-14 PROCEDURE — 83036 HEMOGLOBIN GLYCOSYLATED A1C: CPT | Performed by: FAMILY MEDICINE

## 2019-08-14 ASSESSMENT — MIFFLIN-ST. JEOR: SCORE: 1181.34

## 2019-08-14 NOTE — PROGRESS NOTES
SUBJECTIVE:                                                    Jael Car is a 69 year old female who presents to clinic today for the following health issues:    Previous labs reviewed today with patient. Previous labs showed elevated Glucose and elevated Triglycerides.    Glucose   Date Value Ref Range Status   05/08/2019 120 (H) 70 - 99 mg/dL Final     Comment:     Fasting specimen   10/31/2018 104 (H) 70 - 99 mg/dL Final     Comment:     Fasting specimen   11/29/2017 100 (H) 70 - 99 mg/dL Final   11/20/2017 104 (H) 70 - 99 mg/dL Final     Comment:     Non Fasting   10/04/2017 96 70 - 99 mg/dL Final     Comment:     Fasting specimen     Hx of DVT/PE: Stable. Patient reports she is going on a flight to [place] next year and is curious if she needs to take anything to prevent clots on her flight. Reviewed clotting clinic notes.    Hyperlipidemia: Patient's hyperlipidemia is moderately controlled. Patient is currently prescribed 20 mg Atorvastatin daily for hyperlipidemia management.    Recent Labs   Lab Test 05/08/19  1040 10/31/18  1103  08/14/15  0913 07/30/14  0828   CHOL 177 152   < > 156 161   HDL 46* 43*   < > 55 47*   LDL 96 70   < > 78 87   TRIG 177* 195*   < > 116 137   CHOLHDLRATIO  --   --   --  2.8 3.4    < > = values in this interval not displayed.     Ulcerative Colitis: Stable. Patient's ulcerative colitis is well controlled. Patient is currently prescribed 3000 Colazal BID for ulcerative colitis.     Patient questions if there is any medication that can help her lose weight.     Reviewed and updated as needed this visit by Provider       BP Readings from Last 3 Encounters:   08/14/19 136/82   05/31/19 132/84   05/08/19 (!) 166/92       body mass index is 33.2 kg/m .    Wt Readings from Last 4 Encounters:   08/14/19 74.6 kg (164 lb 6.4 oz)   05/08/19 74.8 kg (165 lb)   01/07/19 74.8 kg (165 lb)   11/21/18 74.8 kg (165 lb)       Health Maintenance    Health Maintenance Due   Topic Date Due      DTAP/TDAP/TD IMMUNIZATION (1 - Tdap) 04/29/1976     ZOSTER IMMUNIZATION (2 of 3) 09/19/2013     PNEUMOCOCCAL IMMUNIZATION 65+ LOW/MEDIUM RISK (1 of 2 - PCV13) 04/29/2016     FIT  10/29/2018       Current Problem List    Patient Active Problem List   Diagnosis     Chondromalacia of patella     Hyperlipidemia LDL goal <130     Lumbago     S/P shoulder surgery     Advanced directives, counseling/discussion     Rotator cuff (capsule) sprain     Osteopenia     Carpal tunnel syndrome     Hyperglycemia     Subclinical hyperthyroidism     Osteoarthrosis of knee     History of pulmonary embolism     Primary osteoarthritis of right knee     MTHFR mutation (H)     History of deep venous thrombosis     Elevated serum protein level     Ulcerative rectosigmoiditis 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 (H)     Microscopic hematuria     Colon polyps     Primary osteoarthritis involving multiple joints     Class 1 obesity with serious comorbidity and body mass index (BMI) of 33.0 to 33.9 in adult, unspecified obesity type       Past Medical History    Past Medical History:   Diagnosis Date     Colon polyps 12/2017    tubular adenoma x 1 - 3mm - due 5 yrs     DVT (deep venous thrombosis) (H) 2011    PE - postop     Elevated serum protein level     Protein S     History of thrombophlebitis      Hyperlipidemia LDL goal < 130 2006     Microscopic hematuria 2019    work up negative - Dr Redmond     MTHFR mutation (H)      Mumps      Osteoarthrosis of knee 2014    Dr Augustine     Osteopenia      Pulmonary emboli (H) 2011    Post op     Pulmonary embolism and infarction (H) 10/5/2011     Problem list name updated by automated process. 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    [facility] - Dr Telles/Paco - left sided       Past Surgical History    Past Surgical History:   Procedure Laterality Date     COLONOSCOPY  09, 9/15, 12/17    colitis, tubular adenoma x 1 - 3mm -  diverticulosis - due 5 yrs     FLEXIBLE SIGMOIDOSCOPY  12/2018    benign, quiscient UC     SIGMOIDOSCOPY FLEXIBLE  09/2017    rectosigmoiditis     SURGICAL HISTORY OF -  Right 2011    Rt Rotator Cuff       Current Medications    Current Outpatient Medications   Medication Sig Dispense Refill     aspirin EC 81 MG tablet Take 81 mg by mouth daily.       atorvastatin (LIPITOR) 20 MG tablet Take 1 tablet (20 mg) by mouth daily 90 tablet 3     balsalazide (COLAZAL) 750 MG capsule Take 4 capsules (3,000 mg) by mouth 2 times daily       Calcium-Phosphorus-Vitamin D (CALCIUM GUMMIES) 250-100-500 MG-MG-UNIT CHEW Take by mouth 2 times daily       cholecalciferol (VITAMIN D3) 5000 UNITS CAPS capsule Take by mouth every other day       multivitamin  peds with iron (FLINTSTONES COMPLETE) 60 MG chewable tablet Take 1 chew tab by mouth daily.         Allergies    No Known Allergies    Immunizations    Immunization History   Administered Date(s) Administered     Zoster vaccine, live 07/25/2013       Family History    Family History   Problem Relation Age of Onset     Alcohol/Drug Father      Lipids Father         high cholesterol     Diabetes Paternal Grandmother      Diabetes Son         diet controlled     Lung Cancer Maternal Grandfather         smoker     Coronary Artery Disease Brother        Social History    Social History     Socioeconomic History     Marital status:      Spouse name: Anand     Number of children: 1     Years of education: 12     Highest education level: Not on file   Occupational History     Not on file   Social Needs     Financial resource strain: Not on file     Food insecurity:     Worry: Not on file     Inability: Not on file     Transportation needs:     Medical: Not on file     Non-medical: Not on file   Tobacco Use     Smoking status: Never Smoker     Smokeless tobacco: Never Used     Tobacco comment: 30 years ago   Substance and Sexual Activity     Alcohol use: Yes     Alcohol/week: 1.2 -  "2.4 oz     Comment: 1-2 drinks about 2 times/week     Drug use: Never     Sexual activity: Not Currently     Partners: Male     Birth control/protection: None   Lifestyle     Physical activity:     Days per week: Not on file     Minutes per session: Not on file     Stress: Not on file   Relationships     Social connections:     Talks on phone: Not on file     Gets together: Not on file     Attends Orthodox service: Not on file     Active member of club or organization: Not on file     Attends meetings of clubs or organizations: Not on file     Relationship status: Not on file     Intimate partner violence:     Fear of current or ex partner: Not on file     Emotionally abused: Not on file     Physically abused: Not on file     Forced sexual activity: Not on file   Other Topics Concern     Parent/sibling w/ CABG, MI or angioplasty before 65F 55M? No      Service Not Asked     Blood Transfusions Not Asked     Caffeine Concern Yes     Comment: 2-4 cans per week     Occupational Exposure Not Asked     Hobby Hazards Not Asked     Sleep Concern Not Asked     Stress Concern Not Asked     Weight Concern Not Asked     Special Diet Not Asked     Back Care Not Asked     Exercise Yes     Comment: 6,000 to 9,000 steps daily     Bike Helmet Not Asked     Seat Belt Yes     Self-Exams Not Asked   Social History Narrative     Not on file       All above reviewed and updated, all stable unless otherwise noted    Recent labs reviewed    ROS:  Constitutional, HEENT, cardiovascular, pulmonary, GI, , musculoskeletal, neuro, skin, endocrine and psych systems are negative, except as otherwise noted.    OBJECTIVE:                                                    /82 (BP Location: Left arm, Patient Position: Chair, Cuff Size: Adult Large)   Pulse 97   Temp 98.4  F (36.9  C) (Oral)   Ht 1.499 m (4' 11\")   Wt 74.6 kg (164 lb 6.4 oz)   LMP 06/03/2004   SpO2 97%   BMI 33.20 kg/m    Body mass index is 33.2 " kg/m .  GENERAL: healthy, alert and no distress  EYES: Eyes grossly normal to inspection  HENT:ear canals and TM's normal upon viewing with otoscope, nose and mouth without ulcers or lesions upon viewing with otoscope, mild bilateral cerumen impaction  NECK: no tenderness, no adenopathy, no asymmetry, no masses, no stiffness; thyroid- normal to palpation  RESP: lungs clear to auscultation - no rales, no rhonchi, no wheezes  CV: regular rates and rhythm, normal S1 S2, no S3 or S4 and no murmur, no click or rub -  ABDOMEN: soft, no tenderness, no  hepatosplenomegaly, no masses, normal bowel sounds  MS: extremities- no gross deformities noted, no edema  SKIN: no suspicious lesions, no rashes  NEURO: strength and tone- normal, sensory exam- grossly normal, mentation- intact, speech- normal, reflexes- symmetric  BACK: no CVA tenderness, no paralumbar tenderness  PSYCH: Alert and oriented times 3; speech- coherent , normal rate and volume; able to articulate logical thoughts, able to abstract reason, no tangential thoughts, no hallucinations or delusions, affect- normal    DIAGNOSTICS/PROCEDURES:                                                      Results for orders placed or performed in visit on 08/14/19 (from the past 24 hour(s))   Hemoglobin A1c   Result Value Ref Range    Hemoglobin A1C 5.6 0 - 5.6 %        Labs Pending     ASSESSMENT:                                                        ICD-10-CM    1. Blood pressure check Z01.30    2. Abnormal glucose R73.09 Comprehensive metabolic panel     Hemoglobin A1c   3. Hyperlipidemia LDL goal <130 E78.5 Comprehensive metabolic panel     Lipid panel reflex to direct LDL Fasting     CK total     NUTRITION REFERRAL   4. Microscopic hematuria R31.29    5. Ulcerative rectosigmoiditis 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 (H) K51.30 NUTRITION REFERRAL   6. History of deep venous thrombosis Z86.718    7. History of pulmonary embolism Z86.711    8. MTHFR mutation (H) E72.12    9. Subclinical  hyperthyroidism E05.90    10. Class 1 obesity with serious comorbidity and body mass index (BMI) of 33.0 to 33.9 in adult, unspecified obesity type E66.9 NUTRITION REFERRAL    Z68.33    11. Medication monitoring encounter Z51.81 Comprehensive metabolic panel     Lipid panel reflex to direct LDL Fasting     Hemoglobin A1c       PLAN:                                                    Discussed treatment/modality options, including risk and benefits she desires:    advised aspirin 81 mg po daily, advised 1 multivitamin per day, advised calcium 0790-4791 mg/d and Vitamin D 800-1200 IU/d, advised dentist every 6 months, advised diet and exercise and advised opthalmologist every 1-2 years.     1) Labs ordered and performed today to recheck labs with irregular results.     2) Patient advised to follow up closer to flight date for [place] to come up with plan to prevent clots, reviewed clotting clinic notes.     3) Patient's hyperlipidemia is moderately controlled. Patient is currently prescribed 20 mg Atorvastatin daily for hyperlipidemia management. Advised continued use.     4) Patient's ulcerative colitis is well controlled. Patient is currently prescribed 3000 Colazal BID for ulcerative colitis. Advised continued use.     5) Patient referred to Nutritionist today for help with weight loss.     6) Patient denied all immunizations.     All diagnosis above reviewed and noted above, otherwise stable.  See [facility] for further details.     Return in about 6 months (around 2/14/2020), or if symptoms worsen or fail to improve, for Medication Recheck Visit.    Health Maintenance Due   Topic Date Due     DTAP/TDAP/TD IMMUNIZATION (1 - Tdap) 04/29/1976     ZOSTER IMMUNIZATION (2 of 3) 09/19/2013     PNEUMOCOCCAL IMMUNIZATION 65+ LOW/MEDIUM RISK (1 of 2 - PCV13) 04/29/2016     FIT  10/29/2018     This document serves as a record of the services and decisions personally performed and made by Ken Gardiner MD. It was created  on his behalf by Hussein Bliss, a trained medical scribe. The creation of this document is based on the provider's statements to the medical scribe.  Hussein Bliss August 14, 2019 9:58 AM     The information in this document, created by the medical scribe for me, accurately reflects the services I personally performed and the decisions made by me. I have reviewed and approved this document for accuracy prior to leaving the patient care area.  August 14, 2019            Ken Gardiner MD [address]  [address]  [phone] Fax

## 2019-08-14 NOTE — LETTER
August 17, 2019      Jael Car  [address] [place] [address]        Dear ,    Your recent results have been reviewed.     They showed:     Labs are overall stable/improved     Low HDL (good cholesterol).   Borderline elevated glucose, borderline prediabetes.     We advise:     Continue current cares.   Balanced low cholesterol diet.   Regular exercise.   Weight loss.     Follow up fasting in 4-6 months.     For additional lab test information, [email or web address] is an excellent reference.     Let us know if you have any questions or concerns.     Thank you for choosing us for your health care needs!     Sincerely,         Ken Gardiner MD, FAAFP               Resulted Orders   Comprehensive metabolic panel   Result Value Ref Range    Sodium 143 133 - 144 mmol/L    Potassium 3.5 3.4 - 5.3 mmol/L    Chloride 109 94 - 109 mmol/L    Carbon Dioxide 27 20 - 32 mmol/L    Anion Gap 7 3 - 14 mmol/L    Glucose 104 (H) 70 - 99 mg/dL      Comment:      Fasting specimen    Urea Nitrogen 11 7 - 30 mg/dL    Creatinine 0.58 0.52 - 1.04 mg/dL    GFR Estimate >90 >60 mL/min/[1.73_m2]      Comment:      Non  GFR Calc  Starting 12/18/2018, serum creatinine based estimated GFR (eGFR) will be   calculated using the Chronic Kidney Disease Epidemiology Collaboration   (CKD-EPI) equation.      GFR Estimate If Black >90 >60 mL/min/[1.73_m2]      Comment:       GFR Calc  Starting 12/18/2018, serum creatinine based estimated GFR (eGFR) will be   calculated using the Chronic Kidney Disease Epidemiology Collaboration   (CKD-EPI) equation.      Calcium 8.7 8.5 - 10.1 mg/dL    Bilirubin Total 0.3 0.2 - 1.3 mg/dL    Albumin 3.5 3.4 - 5.0 g/dL    Protein Total 7.3 6.8 - 8.8 g/dL    Alkaline Phosphatase 135 40 - 150 U/L    ALT 20 0 - 50 U/L    AST 17 0 - 45 U/L   Lipid panel reflex to direct LDL Fasting   Result Value Ref Range    Cholesterol 153 <200 mg/dL    Triglycerides 122 <150 mg/dL       Comment:      Fasting specimen    HDL Cholesterol 40 (L) >49 mg/dL    LDL Cholesterol Calculated 89 <100 mg/dL      Comment:      Desirable:       <100 mg/dl    Non HDL Cholesterol 113 <130 mg/dL   Hemoglobin A1c   Result Value Ref Range    Hemoglobin A1C 5.6 0 - 5.6 %      Comment:      Normal <5.7% Prediabetes 5.7-6.4%  Diabetes 6.5% or higher - adopted from ADA   consensus guidelines.     CK total   Result Value Ref Range    CK Total 41 30 - 225 U/L

## 2019-08-15 LAB
ALBUMIN SERPL-MCNC: 3.5 G/DL (ref 3.4–5)
ALP SERPL-CCNC: 135 U/L (ref 40–150)
ALT SERPL W P-5'-P-CCNC: 20 U/L (ref 0–50)
ANION GAP SERPL CALCULATED.3IONS-SCNC: 7 MMOL/L (ref 3–14)
AST SERPL W P-5'-P-CCNC: 17 U/L (ref 0–45)
BILIRUB SERPL-MCNC: 0.3 MG/DL (ref 0.2–1.3)
BUN SERPL-MCNC: 11 MG/DL (ref 7–30)
CALCIUM SERPL-MCNC: 8.7 MG/DL (ref 8.5–10.1)
CHLORIDE SERPL-SCNC: 109 MMOL/L (ref 94–109)
CHOLEST SERPL-MCNC: 153 MG/DL
CK SERPL-CCNC: 41 U/L (ref 30–225)
CO2 SERPL-SCNC: 27 MMOL/L (ref 20–32)
CREAT SERPL-MCNC: 0.58 MG/DL (ref 0.52–1.04)
GFR SERPL CREATININE-BSD FRML MDRD: >90 ML/MIN/{1.73_M2}
GLUCOSE SERPL-MCNC: 104 MG/DL (ref 70–99)
HDLC SERPL-MCNC: 40 MG/DL
LDLC SERPL CALC-MCNC: 89 MG/DL
NONHDLC SERPL-MCNC: 113 MG/DL
POTASSIUM SERPL-SCNC: 3.5 MMOL/L (ref 3.4–5.3)
PROT SERPL-MCNC: 7.3 G/DL (ref 6.8–8.8)
SODIUM SERPL-SCNC: 143 MMOL/L (ref 133–144)
TRIGL SERPL-MCNC: 122 MG/DL

## 2019-09-12 ENCOUNTER — ALLIED HEALTH/NURSE VISIT (OUTPATIENT)
Dept: EDUCATION SERVICES | Facility: CLINIC | Age: 69
End: 2019-09-12
Payer: COMMERCIAL

## 2019-09-12 DIAGNOSIS — E66.811 CLASS 1 OBESITY WITH SERIOUS COMORBIDITY AND BODY MASS INDEX (BMI) OF 33.0 TO 33.9 IN ADULT, UNSPECIFIED OBESITY TYPE: Primary | ICD-10-CM

## 2019-09-12 PROCEDURE — 97802 MEDICAL NUTRITION INDIV IN: CPT

## 2019-09-12 NOTE — PATIENT INSTRUCTIONS
Could aim to increase activity upwards towards 30 minutes daily     Aim for at least 1200 calories per day   **[employer]itness[employer] -- free  **[employer] --free    [email or web address] is a website

## 2019-09-12 NOTE — PROGRESS NOTES
Medical Nutrition Therapy  Visit Type:Initial assessment and intervention    Jael Car presents today for MNT and education related to weight management.   She is accompanied by self.     ASSESSMENT:   Patient comments/concerns relating to nutrition: I was told I needed to lose weight    Hannah is a bit frustrated with being told to lose weight, as she already feels as though she is not eating much at all. She has had significant issues with ulcerative colitis for 2 years now, and is fearful of eating. She showed me lists of food she was told she could not eat, and doesn't understand how she can eat only these things.     NUTRITION HISTORY:    Breakfast: skips OR protein bar (10-11 gm protein, low sugar)  Lunch: 1 slice 45 calorie bread, deli meat, cheese, can't believe its not butter, wisps chips (handful)  Dinner: hamburger roverto (no bun) OR pork chop (from [employer]), cottage cheese, salad (sometimes- lettuce, cucumber, seeds)  Snacks: not usually  Beverages: water    Misses meals? Breakfast sometimes, sometimes skips eating the whole day  Eats out:  never     Previous diet education:  No     Food allergies/intolerances: has handouts on UC foods to avoid  Diet is low in: calories, fruits and vegetables    EXERCISE: pilates 2x/week    SOCIO/ECONOMIC:   Lives with: spouse    MEDICATIONS:  Current Outpatient Medications   Medication     aspirin EC 81 MG tablet     atorvastatin (LIPITOR) 20 MG tablet     balsalazide (COLAZAL) 750 MG capsule     Calcium-Phosphorus-Vitamin D (CALCIUM GUMMIES) 250-100-500 MG-MG-UNIT CHEW     cholecalciferol (VITAMIN D3) 5000 UNITS CAPS capsule     multivitamin  peds with iron (FLINTSTONES COMPLETE) 60 MG chewable tablet     No current facility-administered medications for this visit.        LABS:  Last Basic Metabolic Panel:  Lab Results   Component Value Date     08/14/2019      Lab Results   Component Value Date    POTASSIUM 3.5 08/14/2019     Lab Results   Component Value Date     CHLORIDE 109 08/14/2019     Lab Results   Component Value Date    TERRA 8.7 08/14/2019     Lab Results   Component Value Date    CO2 27 08/14/2019     Lab Results   Component Value Date    BUN 11 08/14/2019     Lab Results   Component Value Date    CR 0.58 08/14/2019     Lab Results   Component Value Date     08/14/2019       ANTHROPOMETRICS:  Vitals: LMP 06/03/2004   There is no height or weight on file to calculate BMI.      Wt Readings from Last 5 Encounters:   08/14/19 74.6 kg (164 lb 6.4 oz)   05/08/19 74.8 kg (165 lb)   01/07/19 74.8 kg (165 lb)   11/21/18 74.8 kg (165 lb)   10/31/18 74.8 kg (165 lb)       Weight Change: stable    ESTIMATED KCAL REQUIREMENTS:  At least 1200 kcal per day    NUTRITION DIAGNOSIS: Inadequate energy intake related to fear of eating as evidenced by diet report    NUTRITION INTERVENTION:  While Hannah is likely understandably fearful of food, she is likely not eating enough calories per day. Based on her food records, she is eating ~800-900 calories per day, which is likely contributing to her inability to lose weight. Recommended increasing her calories to at least 1200 calories using a food evie, and increasing her activity to help maintain muscle mass.       Nutrition Prescription: Energy Intake: 1200 kcal/day  Education given to support: general nutrition guidelines, consistent meals and portion control  Education Materials Provided: AND guidelines on eating with UC    PATIENT'S BEHAVIOR CHANGE GOALS:   See Patient Instructions for patient stated behavior change goals. AVS was printed and given to patient at today's appointment.    MONITOR / EVALUATE:  RD will monitor/evaluate:  Beliefs and attitudes related to food  Weight change    FOLLOW-UP:  Follow up with RD as needed.    Vesna Egan RD LD CDE  Time spent in minutes: 30  Encounter: Individual

## 2019-09-30 ENCOUNTER — TRANSFERRED RECORDS (OUTPATIENT)
Dept: HEALTH INFORMATION MANAGEMENT | Facility: CLINIC | Age: 69
End: 2019-09-30

## 2019-10-02 ENCOUNTER — HEALTH MAINTENANCE LETTER (OUTPATIENT)
Age: 69
End: 2019-10-02

## 2019-10-14 ENCOUNTER — MYC REFILL (OUTPATIENT)
Dept: FAMILY MEDICINE | Facility: CLINIC | Age: 69
End: 2019-10-14

## 2019-10-14 DIAGNOSIS — E78.5 HYPERLIPIDEMIA LDL GOAL <130: ICD-10-CM

## 2019-10-15 RX ORDER — ATORVASTATIN CALCIUM 20 MG/1
20 TABLET, FILM COATED ORAL DAILY
Qty: 90 TABLET | Refills: 3 | Status: SHIPPED | OUTPATIENT
Start: 2019-10-15 | End: 2020-09-12

## 2019-10-15 NOTE — TELEPHONE ENCOUNTER
"Requested Prescriptions   Pending Prescriptions Disp Refills     atorvastatin (LIPITOR) 20 MG tablet 90 tablet 3     Sig: Take 1 tablet (20 mg) by mouth daily   Last Written Prescription Date:  10/31/2018  Last Fill Quantity: 90,  # refills: 3   Last office visit: 8/14/2019 with prescribing provider:  KATERINA   Future Office Visit:   Next 5 appointments (look out 90 days)    Dec 18, 2019  9:30 AM CST  PHYSICAL with Ken Gardiner MD  [facility] [address] [place] [id number] [phone]             Statins Protocol Passed - 10/14/2019  6:43 PM        Passed - LDL on file in past 12 months     Recent Labs   Lab Test 08/14/19  1035   LDL 89             Passed - No abnormal creatine kinase in past 12 months     Recent Labs   Lab Test 08/14/19  1035   CKT 41                Passed - Recent (12 mo) or future (30 days) visit within the authorizing provider's specialty     Patient has had an office visit with the authorizing provider or a provider within the authorizing providers department within the previous 12 mos or has a future within next 30 days. See \"Patient Info\" tab in inbasket, or \"Choose Columns\" in Meds & Orders section of the refill encounter.              Passed - Medication is active on med list        Passed - Patient is age 18 or older        Passed - No active pregnancy on record        Passed - No positive pregnancy test in past 12 months        Prescription approved per [facility] Protocol.    Chito Nugent RN   [place] [facility]      "

## 2019-11-06 ENCOUNTER — HOSPITAL ENCOUNTER (OUTPATIENT)
Dept: MAMMOGRAPHY | Facility: CLINIC | Age: 69
Discharge: HOME | End: 2019-11-06
Attending: FAMILY MEDICINE | Admitting: FAMILY MEDICINE
Payer: COMMERCIAL

## 2019-11-06 DIAGNOSIS — Z12.31 SCREENING MAMMOGRAM, ENCOUNTER FOR: ICD-10-CM

## 2019-11-06 PROCEDURE — 77063 BREAST TOMOSYNTHESIS BI: CPT

## 2019-12-09 ENCOUNTER — TRANSFERRED RECORDS (OUTPATIENT)
Dept: HEALTH INFORMATION MANAGEMENT | Facility: CLINIC | Age: 69
End: 2019-12-09

## 2019-12-09 LAB
ALT SERPL-CCNC: 21 U/L (ref 0–78)
AST SERPL-CCNC: 19 U/L (ref 0–37)

## 2019-12-15 ENCOUNTER — HEALTH MAINTENANCE LETTER (OUTPATIENT)
Age: 69
End: 2019-12-15

## 2019-12-16 ASSESSMENT — ACTIVITIES OF DAILY LIVING (ADL): CURRENT_FUNCTION: NO ASSISTANCE NEEDED

## 2019-12-18 ENCOUNTER — OFFICE VISIT (OUTPATIENT)
Dept: FAMILY MEDICINE | Facility: CLINIC | Age: 69
End: 2019-12-18
Payer: COMMERCIAL

## 2019-12-18 VITALS
DIASTOLIC BLOOD PRESSURE: 92 MMHG | BODY MASS INDEX: 32.05 KG/M2 | SYSTOLIC BLOOD PRESSURE: 160 MMHG | OXYGEN SATURATION: 97 % | TEMPERATURE: 99.4 F | WEIGHT: 159 LBS | HEIGHT: 59 IN | HEART RATE: 103 BPM

## 2019-12-18 DIAGNOSIS — Z86.711 HISTORY OF PULMONARY EMBOLISM: ICD-10-CM

## 2019-12-18 DIAGNOSIS — Z00.00 ENCOUNTER FOR ROUTINE ADULT HEALTH EXAMINATION WITHOUT ABNORMAL FINDINGS: Primary | ICD-10-CM

## 2019-12-18 DIAGNOSIS — M15.0 PRIMARY OSTEOARTHRITIS INVOLVING MULTIPLE JOINTS: ICD-10-CM

## 2019-12-18 DIAGNOSIS — K63.5 POLYP OF COLON, UNSPECIFIED PART OF COLON, UNSPECIFIED TYPE: ICD-10-CM

## 2019-12-18 DIAGNOSIS — R77.9 ELEVATED SERUM PROTEIN LEVEL: ICD-10-CM

## 2019-12-18 DIAGNOSIS — M85.89 OSTEOPENIA OF MULTIPLE SITES: ICD-10-CM

## 2019-12-18 DIAGNOSIS — R79.9 ABNORMAL FINDING OF BLOOD CHEMISTRY, UNSPECIFIED: ICD-10-CM

## 2019-12-18 DIAGNOSIS — Z15.89 MTHFR MUTATION: ICD-10-CM

## 2019-12-18 DIAGNOSIS — Z51.81 MEDICATION MONITORING ENCOUNTER: ICD-10-CM

## 2019-12-18 DIAGNOSIS — Z12.73 SCREENING FOR OVARIAN CANCER: ICD-10-CM

## 2019-12-18 DIAGNOSIS — E78.5 HYPERLIPIDEMIA LDL GOAL <130: ICD-10-CM

## 2019-12-18 DIAGNOSIS — Z86.718 HISTORY OF DEEP VENOUS THROMBOSIS: ICD-10-CM

## 2019-12-18 DIAGNOSIS — Z12.11 SCREEN FOR COLON CANCER: ICD-10-CM

## 2019-12-18 DIAGNOSIS — E05.90 SUBCLINICAL HYPERTHYROIDISM: ICD-10-CM

## 2019-12-18 DIAGNOSIS — Z12.39 SCREENING FOR BREAST CANCER: ICD-10-CM

## 2019-12-18 DIAGNOSIS — K51.30 ULCERATIVE RECTOSIGMOIDITIS WITHOUT COMPLICATION (H): ICD-10-CM

## 2019-12-18 DIAGNOSIS — R31.29 MICROSCOPIC HEMATURIA: ICD-10-CM

## 2019-12-18 DIAGNOSIS — M17.11 OSTEOARTHRITIS OF RIGHT KNEE, UNSPECIFIED OSTEOARTHRITIS TYPE: ICD-10-CM

## 2019-12-18 DIAGNOSIS — E66.811 CLASS 1 OBESITY WITH SERIOUS COMORBIDITY AND BODY MASS INDEX (BMI) OF 32.0 TO 32.9 IN ADULT, UNSPECIFIED OBESITY TYPE: ICD-10-CM

## 2019-12-18 DIAGNOSIS — R03.0 ELEVATED BLOOD PRESSURE READING WITHOUT DIAGNOSIS OF HYPERTENSION: ICD-10-CM

## 2019-12-18 LAB
ALBUMIN UR-MCNC: ABNORMAL MG/DL
APPEARANCE UR: CLEAR
BACTERIA #/AREA URNS HPF: ABNORMAL /HPF
BILIRUB UR QL STRIP: NEGATIVE
CANCER AG125 SERPL-ACNC: 7 U/ML (ref 0–30)
COLOR UR AUTO: YELLOW
ERYTHROCYTE [DISTWIDTH] IN BLOOD BY AUTOMATED COUNT: 13.9 % (ref 10–15)
GLUCOSE UR STRIP-MCNC: NEGATIVE MG/DL
HBA1C MFR BLD: 5.7 % (ref 0–5.6)
HCT VFR BLD AUTO: 41 % (ref 35–47)
HGB BLD-MCNC: 13.3 G/DL (ref 11.7–15.7)
HGB UR QL STRIP: ABNORMAL
KETONES UR STRIP-MCNC: NEGATIVE MG/DL
LEUKOCYTE ESTERASE UR QL STRIP: NEGATIVE
MCH RBC QN AUTO: 26.8 PG (ref 26.5–33)
MCHC RBC AUTO-ENTMCNC: 32.4 G/DL (ref 31.5–36.5)
MCV RBC AUTO: 83 FL (ref 78–100)
NITRATE UR QL: NEGATIVE
NON-SQ EPI CELLS #/AREA URNS LPF: ABNORMAL /LPF
PH UR STRIP: 6 PH (ref 5–7)
PLATELET # BLD AUTO: 265 10E9/L (ref 150–450)
RBC # BLD AUTO: 4.97 10E12/L (ref 3.8–5.2)
RBC #/AREA URNS AUTO: ABNORMAL /HPF
SOURCE: ABNORMAL
SP GR UR STRIP: 1.02 (ref 1–1.03)
UROBILINOGEN UR STRIP-ACNC: 0.2 EU/DL (ref 0.2–1)
WBC # BLD AUTO: 6.8 10E9/L (ref 4–11)
WBC #/AREA URNS AUTO: ABNORMAL /HPF

## 2019-12-18 PROCEDURE — G0439 PPPS, SUBSEQ VISIT: HCPCS | Performed by: FAMILY MEDICINE

## 2019-12-18 PROCEDURE — 36415 COLL VENOUS BLD VENIPUNCTURE: CPT | Performed by: FAMILY MEDICINE

## 2019-12-18 PROCEDURE — 85027 COMPLETE CBC AUTOMATED: CPT | Performed by: FAMILY MEDICINE

## 2019-12-18 PROCEDURE — 84443 ASSAY THYROID STIM HORMONE: CPT | Performed by: FAMILY MEDICINE

## 2019-12-18 PROCEDURE — 83036 HEMOGLOBIN GLYCOSYLATED A1C: CPT | Performed by: FAMILY MEDICINE

## 2019-12-18 PROCEDURE — 80061 LIPID PANEL: CPT | Performed by: FAMILY MEDICINE

## 2019-12-18 PROCEDURE — 82550 ASSAY OF CK (CPK): CPT | Performed by: FAMILY MEDICINE

## 2019-12-18 PROCEDURE — 82043 UR ALBUMIN QUANTITATIVE: CPT | Performed by: FAMILY MEDICINE

## 2019-12-18 PROCEDURE — 80053 COMPREHEN METABOLIC PANEL: CPT | Performed by: FAMILY MEDICINE

## 2019-12-18 PROCEDURE — 86304 IMMUNOASSAY TUMOR CA 125: CPT | Performed by: FAMILY MEDICINE

## 2019-12-18 PROCEDURE — 81001 URINALYSIS AUTO W/SCOPE: CPT | Performed by: FAMILY MEDICINE

## 2019-12-18 PROCEDURE — 82306 VITAMIN D 25 HYDROXY: CPT | Performed by: FAMILY MEDICINE

## 2019-12-18 PROCEDURE — 84439 ASSAY OF FREE THYROXINE: CPT | Performed by: FAMILY MEDICINE

## 2019-12-18 ASSESSMENT — MIFFLIN-ST. JEOR: SCORE: 1156.85

## 2019-12-18 ASSESSMENT — ACTIVITIES OF DAILY LIVING (ADL): CURRENT_FUNCTION: NO ASSISTANCE NEEDED

## 2019-12-18 NOTE — PATIENT INSTRUCTIONS
Recommend Shingrix vaccine for shingles. Check with insurance to see where the Shingrix vaccine is the cheapest. Follow up 2-6 months after receiving the first shot for the second shot.

## 2019-12-18 NOTE — PROGRESS NOTES
"SUBJECTIVE:   Jael Car is a 69 year old female who presents for Preventive Visit.    Are you in the first 12 months of your Medicare coverage?  No    Healthy Habits:     In general, how would you rate your overall health?  Good    Frequency of exercise:  2-3 days/week    Duration of exercise:  45-60 minutes    Do you usually eat at least 4 servings of fruit and vegetables a day, include whole grains    & fiber and avoid regularly eating high fat or \"junk\" foods?  No    Taking medications regularly:  No    Barriers to taking medications:  Other    Medication side effects:  None    Ability to successfully perform activities of daily living:  No assistance needed    Home Safety:  No safety concerns identified    Hearing Impairment:  No hearing concerns    In the past 6 months, have you been bothered by leaking of urine?  No    In general, how would you rate your overall mental or emotional health?  Good      PHQ-2 Total Score: 0    Additional concerns today:  No    DVT/PE/MTHFR Mutation: Stable. No issues.     Hyperlipidemia: Patient's hyperlipidemia is well controlled. Patient is currently prescribed 20 mg Atorvastatin daily for hyperlipidemia management.    Recent Labs   Lab Test 08/14/19  1035 05/08/19  1040  08/14/15  0913 07/30/14  0828   CHOL 153 177   < > 156 161   HDL 40* 46*   < > 55 47*   LDL 89 96   < > 78 87   TRIG 122 177*   < > 116 137   CHOLHDLRATIO  --   --   --  2.8 3.4    < > = values in this interval not displayed.     Ulcerative Colitis: Stable. Patient's ulcerative colitis is well controlled. Patient is currently prescribed 3000 Colazal BID for ulcerative colitis. Patient consults with [facility] for further UC management.     Osteoarthritis of Right Knee: Stable. No issues.     Do you feel safe in your environment? Yes    Have you ever done Advance Care Planning? (For example, a Health Directive, POLST, or a discussion with a medical provider or your loved ones about your wishes): Yes, patient " [facility] has an Advance Care Planning document and will bring a copy to the clinic.    Fall risk  Fallen 2 or more times in the past year?: No  Any fall with injury in the past year?: No    Cognitive Screening   1) Repeat 3 items (Leader, Season, Table)    2) Clock draw: NORMAL  3) 3 item recall: Recalls 3 objects  Results: 3 items recalled: COGNITIVE IMPAIRMENT LESS LIKELY    Mini-CogTM Copyright WONG Martin. Licensed by the author for use in [facility]; reprinted with permission ([email or web address]). All rights reserved.      Do you have sleep apnea, excessive snoring or daytime drowsiness?: no    Reviewed and updated as needed this visit by clinical staff  Tobacco  Allergies  Meds  Med Hx  Surg Hx  Fam Hx  Soc Hx      Reviewed and updated as needed this visit by Provider  Allergies        Social History     Tobacco Use     Smoking status: Never Smoker     Smokeless tobacco: Never Used     Tobacco comment: 30 years ago   Substance Use Topics     Alcohol use: Yes     Alcohol/week: 2.0 - 4.0 standard drinks     Comment: 1-2 drinks about 2 times/week     If you drink alcohol do you typically have >3 drinks per day or >7 drinks per week? No    Alcohol Use 12/18/2019   Prescreen: >3 drinks/day or >7 drinks/week? -   Prescreen: >3 drinks/day or >7 drinks/week? No       Current providers sharing in care for this patient include:   Patient Care Team:  Ken Gardiner MD as PCP - General (Family Practice)  Ken Gardiner MD as Assigned PCP  Vesna Egan RD as Diabetes Educator (Dietitian, Registered)    The following health maintenance items are reviewed in Epic and correct as of today:  Health Maintenance   Topic Date Due     DTAP/TDAP/TD IMMUNIZATION (1 - Tdap) 04/29/1961     ZOSTER IMMUNIZATION (2 of 3) 09/19/2013     PNEUMOCOCCAL IMMUNIZATION 65+ LOW/MEDIUM RISK (1 of 2 - PCV13) 04/29/2016     FIT  10/29/2018     DEXA  10/26/2019     MEDICARE ANNUAL WELLNESS VISIT  10/31/2019     FALL RISK ASSESSMENT   10/31/2019     ALT  08/14/2020     LIPID  08/14/2020     MAMMO SCREENING  11/06/2020     ADVANCE CARE PLANNING  12/18/2024     HEPATITIS C SCREENING  Completed     PHQ-2  Completed     INFLUENZA VACCINE  Addressed     IPV IMMUNIZATION  Aged Out     MENINGITIS IMMUNIZATION  Aged Out     Health Maintenance     Colonoscopy:  Last 12/14/2017, Due 12/2022   FIT:  Last 10/29/2017, Due              Mammogram:  Last 11/6/2019. Due 11/2020              PAP:  N/A after age 65   DEXA:  Last 10/26/2016, Due     Health Maintenance Due   Topic Date Due     DTAP/TDAP/TD IMMUNIZATION (1 - Tdap) 04/29/1961     ZOSTER IMMUNIZATION (2 of 3) 09/19/2013     PNEUMOCOCCAL IMMUNIZATION 65+ LOW/MEDIUM RISK (1 of 2 - PCV13) 04/29/2016     FIT  10/29/2018     DEXA  10/26/2019     MEDICARE ANNUAL WELLNESS VISIT  10/31/2019     FALL RISK ASSESSMENT  10/31/2019       Current Problem List    Patient Active Problem List   Diagnosis     Chondromalacia of patella     Hyperlipidemia LDL goal <130     Lumbago     S/P shoulder surgery     Advanced directives, counseling/discussion     Rotator cuff (capsule) sprain     Osteopenia     Carpal tunnel syndrome     Hyperglycemia     Subclinical hyperthyroidism     Osteoarthrosis of knee     History of pulmonary embolism     Primary osteoarthritis of right knee     MTHFR mutation (H)     History of deep venous thrombosis     Elevated serum protein level     Ulcerative rectosigmoiditis 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 (H)     Microscopic hematuria     Colon polyps     Primary osteoarthritis involving multiple joints     Class 1 obesity with serious comorbidity and body mass index (BMI) of 32.0 to 32.9 in adult, unspecified obesity type       Past Medical History    Past Medical History:   Diagnosis Date     Colon polyps 12/2017    tubular adenoma x 1 - 3mm - due 5 yrs     DVT (deep venous thrombosis) (H) 2011    PE - postop     Elevated serum protein level     Protein S     History of thrombophlebitis      Hyperlipidemia  LDL goal < 130 2006     Microscopic hematuria 2019    work up negative - Dr Redmond     MTHFR mutation (H)      Mumps      Osteoarthrosis of knee 2014    Dr Augustine     Osteopenia      Pulmonary emboli (H) 2011    Post op     Pulmonary embolism and infarction (H) 10/5/2011     Problem list name updated by automated process. 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    [facility] - Dr Telles/Paco - left sided       Past Surgical History    Past Surgical History:   Procedure Laterality Date     COLONOSCOPY  09, 9/15, 12/17    colitis, tubular adenoma x 1 - 3mm - diverticulosis - due 5 yrs     FLEXIBLE SIGMOIDOSCOPY  12/2018    benign, quiscient UC     SIGMOIDOSCOPY FLEXIBLE  09/2017    rectosigmoiditis     SURGICAL HISTORY OF -  Right 2011    Rt Rotator Cuff       Current Medications    Current Outpatient Medications   Medication Sig Dispense Refill     aspirin EC 81 MG tablet Take 81 mg by mouth daily.       atorvastatin (LIPITOR) 20 MG tablet Take 1 tablet (20 mg) by mouth daily 90 tablet 3     balsalazide (COLAZAL) 750 MG capsule Take 4 capsules (3,000 mg) by mouth 2 times daily       Calcium-Phosphorus-Vitamin D (CALCIUM GUMMIES) 250-100-500 MG-MG-UNIT CHEW Take by mouth 2 times daily       cholecalciferol (VITAMIN D3) 5000 UNITS CAPS capsule Take by mouth every other day       multivitamin  peds with iron (FLINTSTONES COMPLETE) 60 MG chewable tablet Take 1 chew tab by mouth daily.         Allergies    No Known Allergies    Immunizations    Immunization History   Administered Date(s) Administered     Zoster vaccine, live 07/25/2013       Family History    Family History   Problem Relation Age of Onset     Alcohol/Drug Father      Lipids Father         high cholesterol     Diabetes Paternal Grandmother      Diabetes Son         diet controlled     Lung Cancer Maternal Grandfather         smoker     Coronary Artery Disease Brother         Social History    Social History     Socioeconomic History     Marital status:      Spouse name: Anand     Number of children: 1     Years of education: 12     Highest education level: Not on file   Occupational History     Not on file   Social Needs     Financial resource strain: Not on file     Food insecurity:     Worry: Not on file     Inability: Not on file     Transportation needs:     Medical: Not on file     Non-medical: Not on file   Tobacco Use     Smoking status: Never Smoker     Smokeless tobacco: Never Used     Tobacco comment: 30 years ago   Substance and Sexual Activity     Alcohol use: Yes     Alcohol/week: 2.0 - 4.0 standard drinks     Comment: 1-2 drinks about 2 times/week     Drug use: Never     Sexual activity: Not Currently     Partners: Male     Birth control/protection: None   Lifestyle     Physical activity:     Days per week: Not on file     Minutes per session: Not on file     Stress: Not on file   Relationships     Social connections:     Talks on phone: Not on file     Gets together: Not on file     Attends Holiness service: Not on file     Active member of club or organization: Not on file     Attends meetings of clubs or organizations: Not on file     Relationship status: Not on file     Intimate partner violence:     Fear of current or ex partner: Not on file     Emotionally abused: Not on file     Physically abused: Not on file     Forced sexual activity: Not on file   Other Topics Concern     Parent/sibling w/ CABG, MI or angioplasty before 65F 55M? No      Service Not Asked     Blood Transfusions Not Asked     Caffeine Concern Yes     Comment: 2-4 cans per week     Occupational Exposure Not Asked     Hobby Hazards Not Asked     Sleep Concern Not Asked     Stress Concern Not Asked     Weight Concern Not Asked     Special Diet Not Asked     Back Care Not Asked     Exercise Yes     Comment: 6,000 to 9,000 steps daily     Bike Helmet Not Asked     Seat Belt Yes      "Self-Exams Not Asked   Social History Narrative     Not on file       Review of Systems  Constitutional, HEENT, cardiovascular, pulmonary, GI, , musculoskeletal, neuro, skin, endocrine and psych systems are negative, except as otherwise noted.    OBJECTIVE:   BP (!) 160/92   Pulse 103   Temp 99.4  F (37.4  C) (Oral)   Ht 1.499 m (4' 11\")   Wt 72.1 kg (159 lb)   LMP 06/03/2004   SpO2 97%   BMI 32.11 kg/m   Estimated body mass index is 32.11 kg/m  as calculated from the following:    Height as of this encounter: 1.499 m (4' 11\").    Weight as of this encounter: 72.1 kg (159 lb).  Physical Exam  GENERAL APPEARANCE: healthy, alert and no distress  EYES: Eyes grossly normal to inspection  HENT:ear canals and TM's normal upon viewing with otoscope, nose and mouth without ulcers or lesions upon viewing with otoscope, bilateral cerumen impaction  NECK: no adenopathy, no asymmetry, masses, or scars and thyroid normal to palpation  RESP: lungs clear to auscultation - no rales, rhonchi or wheezes  BREAST: normal without masses, tenderness or nipple discharge and no palpable axillary masses or adenopathy  CV: regular rate and rhythm, normal S1 S2, no S3 or S4, no murmur, click or rub, no peripheral edema and peripheral pulses strong  ABDOMEN: soft, nontender, no hepatosplenomegaly, no masses and bowel sounds normal   (female): Declined  MS: no musculoskeletal defects are noted and gait is age appropriate without ataxia  SKIN: no suspicious lesions or rashes  NEURO: Normal strength and tone, sensory exam grossly normal, mentation intact and speech normal  PSYCH: mentation appears normal and affect normal/bright  BACK: no CVA tenderness, no paralumbar tenderness    Diagnostic Test Results:  Results for orders placed or performed in visit on 12/18/19 (from the past 24 hour(s))   CBC with platelets   Result Value Ref Range    WBC 6.8 4.0 - 11.0 10e9/L    RBC Count 4.97 3.8 - 5.2 10e12/L    Hemoglobin 13.3 11.7 - 15.7 " g/dL    Hematocrit 41.0 35.0 - 47.0 %    MCV 83 78 - 100 fl    MCH 26.8 26.5 - 33.0 pg    MCHC 32.4 31.5 - 36.5 g/dL    RDW 13.9 10.0 - 15.0 %    Platelet Count 265 150 - 450 10e9/L   Hemoglobin A1c   Result Value Ref Range    Hemoglobin A1C 5.7 (H) 0 - 5.6 %        Labs Pending    ASSESSMENT / PLAN:       ICD-10-CM    1. Encounter for routine adult health examination without abnormal findings Z00.00 Comprehensive metabolic panel     Lipid panel reflex to direct LDL Fasting     CBC with platelets     CK total     TSH with free T4 reflex     UA reflex to Microscopic and Culture     Albumin Random Urine Quantitative with Creat Ratio     Fecal colorectal cancer screen FIT     Hemoglobin A1c     T4 free     Vitamin D Deficiency        2. Ulcerative rectosigmoiditis 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 (H) K51.30 Comprehensive metabolic panel   3. MTHFR mutation (H) E72.12 CBC with platelets   4. Elevated serum protein level R77.9 Comprehensive metabolic panel   5. History of deep venous thrombosis Z86.718 CBC with platelets   6. History of pulmonary embolism Z86.711 CBC with platelets   7. Subclinical hyperthyroidism E05.90 TSH with free T4 reflex     T4 free   8. Primary osteoarthritis involving multiple joints M15.0    9. Osteoarthritis of right knee, unspecified osteoarthritis type M17.11    10. Hyperlipidemia LDL goal <130 E78.5 Comprehensive metabolic panel     Lipid panel reflex to direct LDL Fasting     CK total   11. Elevated blood pressure reading without diagnosis of hypertension R03.0    12. Microscopic hematuria R31.29 UA reflex to Microscopic and Culture   13. Polyp of colon, unspecified part of colon, unspecified type K63.5 Fecal colorectal cancer screen FIT   14. Osteopenia of multiple sites M85.89 Comprehensive metabolic panel     Vitamin D Deficiency     DX Hip/Pelvis/Spine   15. Screening for breast cancer Z12.39    16. Class 1 obesity with serious comorbidity and body mass index (BMI) of 32.0 to 32.9 in adult,  unspecified obesity type E66.9     Z68.32    17. Screen for colon cancer Z12.11    18. Screening for ovarian cancer Z12.73    19. Medication monitoring encounter Z51.81 Comprehensive metabolic panel     Lipid panel reflex to direct LDL Fasting     CBC with platelets     CK total     TSH with free T4 reflex     UA reflex to Microscopic and Culture     Albumin Random Urine Quantitative with Creat Ratio     Hemoglobin A1c     T4 free     Vitamin D Deficiency   20. Abnormal finding of blood chemistry, unspecified  R79.9 Hemoglobin A1c       Discussed treatment/modality options, including risk and benefits, she desires advised aspirin 81 mg po daily, advised 1 multivitamin per day, advised calcium 3527-5560 mg/d and Vitamin D 800-1200 IU/d, advised dentist every 6 months, advised diet and exercise and advised opthalmologist every 1-2 years. All diagnosis above reviewed and noted above, otherwise stable.  See [employer] orders for further details.      1) Patient's hyperlipidemia is well controlled. Patient is currently prescribed 20 mg Atorvastatin daily for hyperlipidemia management. Advised continued use.     2) Patient's ulcerative colitis is well controlled. Patient is currently prescribed 3000 Colazal BID for ulcerative colitis. Advised continued use. Patient consults with [facility] for further UC management. Recommend continued follow up.     3) Patient declines all immunizations.     4) Dexa scan ordered today.     5) Follow up in 1 year for complete physical exam.     6) Watch BP closely, low salt diet, weight loss, regular exercise, recheck soon. Meds prn.    Health Maintenance Due   Topic Date Due     DTAP/TDAP/TD IMMUNIZATION (1 - Tdap) 04/29/1961     ZOSTER IMMUNIZATION (2 of 3) 09/19/2013     PNEUMOCOCCAL IMMUNIZATION 65+ LOW/MEDIUM RISK (1 of 2 - PCV13) 04/29/2016     FIT  10/29/2018     DEXA  10/26/2019     MEDICARE ANNUAL WELLNESS VISIT  10/31/2019     FALL RISK ASSESSMENT  10/31/2019  "    COUNSELING:  Reviewed preventive health counseling, as reflected in patient instructions    Estimated body mass index is 32.11 kg/m  as calculated from the following:    Height as of this encounter: 1.499 m (4' 11\").    Weight as of this encounter: 72.1 kg (159 lb).     reports that she has never smoked. She has never used smokeless tobacco.    Appropriate preventive services were discussed with this patient, including applicable screening as appropriate for cardiovascular disease, diabetes, osteopenia/osteoporosis, and glaucoma.  As appropriate for age/gender, discussed screening for colorectal cancer, prostate cancer, breast cancer, and cervical cancer. Checklist reviewing preventive services available has been given to the patient.    Reviewed patients plan of care and provided an AVS. The Basic Care Plan (routine screening as documented in Health Maintenance) for Jael meets the Care Plan requirement. This Care Plan has been established and reviewed with the Patient.    Counseling Resources:  ATP IV Guidelines  Pooled Cohorts Equation Calculator  Breast Cancer Risk Calculator  FRAX Risk Assessment  ICSI Preventive Guidelines  Dietary Guidelines for Americans, 2010  [employer]'s MyPlate  ASA Prophylaxis  Lung CA Screening    This document serves as a record of the services and decisions personally performed and made by Ken Gardiner MD. It was created on his behalf by Hussein Bliss, a trained medical scribe. The creation of this document is based on the provider's statements to the medical scribe.  Hussein Bliss December 18, 2019 9:46 AM     The information in this document, created by the medical scribe for me, accurately reflects the services I personally performed and the decisions made by me. I have reviewed and approved this document for accuracy prior to leaving the patient care area.  December 18, 2019          Ken Gardiner MD, [address]  " [id number]    [phone] Fax

## 2019-12-19 LAB
ALBUMIN SERPL-MCNC: 3.6 G/DL (ref 3.4–5)
ALP SERPL-CCNC: 136 U/L (ref 40–150)
ALT SERPL W P-5'-P-CCNC: 24 U/L (ref 0–50)
ANION GAP SERPL CALCULATED.3IONS-SCNC: 5 MMOL/L (ref 3–14)
AST SERPL W P-5'-P-CCNC: 18 U/L (ref 0–45)
BILIRUB SERPL-MCNC: 0.4 MG/DL (ref 0.2–1.3)
BUN SERPL-MCNC: 11 MG/DL (ref 7–30)
CALCIUM SERPL-MCNC: 9 MG/DL (ref 8.5–10.1)
CHLORIDE SERPL-SCNC: 108 MMOL/L (ref 94–109)
CHOLEST SERPL-MCNC: 162 MG/DL
CK SERPL-CCNC: 50 U/L (ref 30–225)
CO2 SERPL-SCNC: 27 MMOL/L (ref 20–32)
CREAT SERPL-MCNC: 0.62 MG/DL (ref 0.52–1.04)
CREAT UR-MCNC: 158 MG/DL
DEPRECATED CALCIDIOL+CALCIFEROL SERPL-MC: 35 UG/L (ref 20–75)
GFR SERPL CREATININE-BSD FRML MDRD: >90 ML/MIN/{1.73_M2}
GLUCOSE SERPL-MCNC: 108 MG/DL (ref 70–99)
HDLC SERPL-MCNC: 45 MG/DL
LDLC SERPL CALC-MCNC: 87 MG/DL
MICROALBUMIN UR-MCNC: 42 MG/L
MICROALBUMIN/CREAT UR: 26.46 MG/G CR (ref 0–25)
NONHDLC SERPL-MCNC: 117 MG/DL
POTASSIUM SERPL-SCNC: 3.5 MMOL/L (ref 3.4–5.3)
PROT SERPL-MCNC: 7.5 G/DL (ref 6.8–8.8)
SODIUM SERPL-SCNC: 140 MMOL/L (ref 133–144)
T4 FREE SERPL-MCNC: 1.2 NG/DL (ref 0.76–1.46)
TRIGL SERPL-MCNC: 149 MG/DL
TSH SERPL DL<=0.005 MIU/L-ACNC: 0.3 MU/L (ref 0.4–4)

## 2019-12-19 NOTE — RESULT ENCOUNTER NOTE
Triage: please call and advise of results/recommendations:     Hannah  I have reviewed your recent labs. Here are the results:      -Cholesterol levels are at your goal levels.  ADVISE: continuing your medication, a regular exercise program with at least 150 minutes of aerobic exercise per week, and eating a low saturated fat/low carbohydrate diet.  Also, you should recheck this fasting cholesterol panel in 12 months.  -Liver and gallbladder tests (ALT,AST, Alk phos,bilirubin) are normal.  -Kidney function (GFR) is normal.  -Sodium is normal.  -Potassium is normal.  -Calcium is normal.  -Glucose is slight elevated and may be a sign of early diabetes (prediabetes). ADVISE:: eating a low carbohydrate diet, exercising, trying to lose weight (if necessary) and rechecking your glucose level in 12 months.  -TSH (thyroid stimulating hormone) level indicates hyperthyroidism (an overactive thyroid).  This can cause a number of symptoms including palpitations, anxiety, agitation, tremor, insomnia, diarrhea, heat intolerance.  ADVISE: getting additional lab work and a special scan of your thyroid done to help determine what is causing this condition. Please make a lab appointment  To check your thyroid stimulating immunoglobulin and someone from imaging will call you to set up the scan.  Then, you should have a follow-up appointment in clinic after the tests are done to discuss the next steps in your evaluation and treatment.  -CK (muscle enzyme test) is normal.  -Vitamin D level is normal and getting 1000 IU daily in your diet or supplements is recommended.      If you have any questions please do not hesitate to contact our office via phone ([phone]) or MyChart.    Arabella Milligan, MS, PA-C  [facility]

## 2019-12-27 ENCOUNTER — ALLIED HEALTH/NURSE VISIT (OUTPATIENT)
Dept: NURSING | Facility: CLINIC | Age: 69
End: 2019-12-27
Payer: COMMERCIAL

## 2019-12-27 VITALS
DIASTOLIC BLOOD PRESSURE: 90 MMHG | HEART RATE: 80 BPM | BODY MASS INDEX: 32.96 KG/M2 | OXYGEN SATURATION: 97 % | RESPIRATION RATE: 18 BRPM | WEIGHT: 163.2 LBS | SYSTOLIC BLOOD PRESSURE: 144 MMHG

## 2019-12-27 DIAGNOSIS — E05.90 SUBCLINICAL HYPERTHYROIDISM: ICD-10-CM

## 2019-12-27 DIAGNOSIS — I10 HYPERTENSION, UNSPECIFIED TYPE: Primary | ICD-10-CM

## 2019-12-27 PROCEDURE — 36415 COLL VENOUS BLD VENIPUNCTURE: CPT | Performed by: FAMILY MEDICINE

## 2019-12-27 PROCEDURE — 99000 SPECIMEN HANDLING OFFICE-LAB: CPT | Performed by: FAMILY MEDICINE

## 2019-12-27 PROCEDURE — 83520 IMMUNOASSAY QUANT NOS NONAB: CPT | Mod: 90 | Performed by: FAMILY MEDICINE

## 2019-12-27 NOTE — PROGRESS NOTES
Hypertension Follow-up      Do you check your blood pressure regularly outside of the clinic? No     Are you following a low salt diet? No    Are your blood pressures ever more than 140 on the top number (systolic) OR more   than 90 on the bottom number (diastolic), for example 140/90? No      How many servings of fruits and vegetables do you eat daily?  0-1    On average, how many sweetened beverages do you drink each day (Examples: soda, juice, sweet tea, etc.  Do NOT count diet or artificially sweetened beverages)?   0  How many days per week do you miss taking your medication? 1    What makes it hard for you to take your medications?  at [facility]       BP Readings from Last 6 Encounters:   12/27/19 (!) 144/90   12/18/19 (!) 160/92   08/14/19 136/82   05/31/19 132/84   05/08/19 (!) 166/92   10/31/18 (!) 154/92     Wt Readings from Last 2 Encounters:   12/18/19 72.1 kg (159 lb)   08/14/19 74.6 kg (164 lb 6.4 oz)     TSH   Date Value Ref Range Status   12/18/2019 0.30 (L) 0.40 - 4.00 mU/L Final   05/08/2019 0.23 (L) 0.40 - 4.00 mU/L Final   10/31/2018 0.30 (L) 0.40 - 4.00 mU/L Final   10/04/2017 0.51 0.40 - 4.00 mU/L Final   10/04/2016 0.40 0.40 - 4.00 mU/L Final     Pt reports having high blood pressure at last visit, d/t white-coat syndrome.  DENIES: CP, SOB, Difficulty Breathing, Dizziness/Lightheadedness, Numbness/Tingling, HA, Vision/Hearing Changes, N/V, Palpitations    Next 5 appointments (look out 90 days)    Jan 24, 2020  2:50 PM CST  [facility] [address] [place] [id number] [phone]        Routing to PCP for review, advise, and new orders if needed.      Chito Nugent RN   [facility] Triage

## 2019-12-30 ENCOUNTER — TELEPHONE (OUTPATIENT)
Dept: FAMILY MEDICINE | Facility: CLINIC | Age: 69
End: 2019-12-30

## 2019-12-30 LAB — TSH RECEP AB SER-ACNC: <1 IU/L (ref 0–1.75)

## 2019-12-30 NOTE — TELEPHONE ENCOUNTER
Chito Nugent RN at 12/27/2019  9:30 AM     Status: Signed      Hypertension Follow-up       Do you check your blood pressure regularly outside of the clinic? No     Are you following a low salt diet? No    Are your blood pressures ever more than 140 on the top number (systolic) OR more       than 90 on the bottom number (diastolic), for example 140/90? No       How many servings of fruits and vegetables do you eat daily?  0-1    On average, how many sweetened beverages do you drink each day (Examples: soda, juice, sweet tea, etc.  Do NOT count diet or artificially sweetened beverages)?   0    How many days per week do you miss taking your medication? 1    What makes it hard for you to take your medications?  at [facility]            BP Readings from Last 6 Encounters:   12/27/19 (!) 144/90   12/18/19 (!) 160/92   08/14/19 136/82   05/31/19 132/84   05/08/19 (!) 166/92   10/31/18 (!) 154/92          Wt Readings from Last 2 Encounters:   12/18/19 72.1 kg (159 lb)   08/14/19 74.6 kg (164 lb 6.4 oz)            TSH   Date Value Ref Range Status   12/18/2019 0.30 (L) 0.40 - 4.00 mU/L Final   05/08/2019 0.23 (L) 0.40 - 4.00 mU/L Final   10/31/2018 0.30 (L) 0.40 - 4.00 mU/L Final   10/04/2017 0.51 0.40 - 4.00 mU/L Final   10/04/2016 0.40 0.40 - 4.00 mU/L Final      Pt reports having high blood pressure at last visit, d/t white-coat syndrome.  DENIES: CP, SOB, Difficulty Breathing, Dizziness/Lightheadedness, Numbness/Tingling, HA, Vision/Hearing Changes, N/V, Palpitations         Next 5 appointments (look out 90 days)    Jan 24, 2020  2:50 PM CST  Office Visit with Ken Gardiner MD  [facility] ([facility]) [address] [place] [id number] [phone]          Routing to PCP for review, advise, and new orders if needed.        Chito Nugent RN   [facility]         Ken Gardiner MD at 12/27/2019  9:30 AM     Status: Signed      Advise diet,  exercise, weight loss, recheck BP soon with follow up visit

## 2019-12-30 NOTE — TELEPHONE ENCOUNTER
Called patient @ [phone] -     Advised of notes below - Patient stated an understanding and agreed with plan.  Next 5 appointments (look out 90 days)    Jan 24, 2020  2:50 PM CST  Office Visit with Ken Gardiner MD  [facility] [address] [place] [id number] [phone]        Patient stated she has an appt scheduled above - patient stated she was told to schedule a scan for her thyroid per last lab results    12/18/2019 Lab Result Note per AHMET Ramsey:   -TSH (thyroid stimulating hormone) level indicates hyperthyroidism (an overactive thyroid).  This can cause a number of symptoms including palpitations, anxiety, agitation, tremor, insomnia, diarrhea, heat intolerance.  ADVISE: getting additional lab work and a special scan of your thyroid done to help determine what is causing this condition. Please make a lab appointment  To check your thyroid stimulating immunoglobulin and someone from imaging will call you to set up the scan.  Then, you should have a follow-up appointment in clinic after the tests are done to discuss the next steps in your evaluation and treatment.     No orders were placed  Routing to AHMET THOMAS - please order imaging      Licha Cuevas RN  [facility]

## 2019-12-30 NOTE — TELEPHONE ENCOUNTER
This was ordered day of result note, pt needs to contact [facility] (they typically reach out) but give her the contact # for nuclear medicine.    Here is the order:

## 2019-12-31 DIAGNOSIS — Z00.00 ENCOUNTER FOR ROUTINE ADULT HEALTH EXAMINATION WITHOUT ABNORMAL FINDINGS: ICD-10-CM

## 2019-12-31 DIAGNOSIS — K63.5 POLYP OF COLON, UNSPECIFIED PART OF COLON, UNSPECIFIED TYPE: ICD-10-CM

## 2019-12-31 PROCEDURE — 82274 ASSAY TEST FOR BLOOD FECAL: CPT | Performed by: FAMILY MEDICINE

## 2019-12-31 NOTE — TELEPHONE ENCOUNTER
Called #   Telephone Information:   Mobile [phone]     Pt stated she has Radiology phone number to schedule. Pt is waiting until Friday for them to reach out but will call to schedule if no one has contacted her. Patient stated an understanding and agreed with plan.      Chito Nugent RN   [facility]

## 2020-01-01 LAB — HEMOCCULT STL QL IA: NEGATIVE

## 2020-01-02 ENCOUNTER — TELEPHONE (OUTPATIENT)
Dept: FAMILY MEDICINE | Facility: CLINIC | Age: 70
End: 2020-01-02

## 2020-01-02 NOTE — TELEPHONE ENCOUNTER
Imaging is for NM Thyroid Uptake and Scan    Called Radiology Scheduling - stated patient would need to take a pill for this.   Per Radiology Tech, they cannot do scan if patient cannot swallow pills.     Routing to PCP for further review/recommendations/orders.    Licha Cuevas RN  [facility]

## 2020-01-02 NOTE — TELEPHONE ENCOUNTER
Reason for Call:  Other call back    Detailed comments: Patient is calling about the imaging they want done for her thyroid because they want her to take a pill and patient states she cant swallow pills     Phone Number Patient can be reached at: Work number on file:  There is no work phone number on file. or Cell number on file:    Telephone Information:   Mobile [phone]       Best Time: anytime     Can we leave a detailed message on this number? YES    Call taken on 1/2/2020 at 1:24 PM by Yas Rodriguez

## 2020-01-03 NOTE — TELEPHONE ENCOUNTER
Message handled by Nurse Triage with [facility] - provider name: Dr. Gardiner - advise endocrinology consult (Dr. Everett) for testing then.    Licha Cuevas RN  [facility]

## 2020-01-03 NOTE — TELEPHONE ENCOUNTER
Called patient @ # below -     Advised of notes below -   Patient stated that she can only swallow a pill if she takes a half a cracker (Ritz) with cream cheese. Was told she needs to be NPO 2.h before and after.   Also wanted to know how big the pills were.     Called Radiology -   Pill is ~size of pinky finger nail.   Confirmed patient needs to be NPO 2 hours before & after scan.    attempted to reach tech to see if patient could take pill with half a cracker/cream cheese - no tech available.     RN will call again on Monday.     Licha Cuevas RN  [facility]=

## 2020-01-06 NOTE — TELEPHONE ENCOUNTER
Called Radiology - spoke with tech    Stated if patient brings her own cracker/cream cheese they are fine with that.     Called patient @ # below -   Advised of notes - Patient stated an understanding and agreed with plan.    Licha Cuevas RN  [facility]

## 2020-01-15 ENCOUNTER — HOSPITAL ENCOUNTER (OUTPATIENT)
Dept: NUCLEAR MEDICINE | Facility: CLINIC | Age: 70
Setting detail: NUCLEAR MEDICINE
Discharge: HOME | End: 2020-01-15
Attending: FAMILY MEDICINE | Admitting: FAMILY MEDICINE
Payer: COMMERCIAL

## 2020-01-15 DIAGNOSIS — E05.90 SUBCLINICAL HYPERTHYROIDISM: ICD-10-CM

## 2020-01-15 PROCEDURE — 34300033 ZZH RX 343: Performed by: FAMILY MEDICINE

## 2020-01-15 PROCEDURE — 78014 THYROID IMAGING W/BLOOD FLOW: CPT

## 2020-01-15 PROCEDURE — A9516 IODINE I-123 SOD IODIDE MIC: HCPCS | Performed by: FAMILY MEDICINE

## 2020-01-15 RX ADMIN — Medication 230 UCI.: at 13:28

## 2020-01-16 ENCOUNTER — HOSPITAL ENCOUNTER (OUTPATIENT)
Dept: NUCLEAR MEDICINE | Facility: CLINIC | Age: 70
Setting detail: NUCLEAR MEDICINE
End: 2020-01-16
Attending: FAMILY MEDICINE
Payer: COMMERCIAL

## 2020-01-24 ENCOUNTER — OFFICE VISIT (OUTPATIENT)
Dept: FAMILY MEDICINE | Facility: CLINIC | Age: 70
End: 2020-01-24
Payer: COMMERCIAL

## 2020-01-24 VITALS
WEIGHT: 159 LBS | BODY MASS INDEX: 32.05 KG/M2 | TEMPERATURE: 98.7 F | HEIGHT: 59 IN | OXYGEN SATURATION: 94 % | DIASTOLIC BLOOD PRESSURE: 84 MMHG | HEART RATE: 109 BPM | SYSTOLIC BLOOD PRESSURE: 139 MMHG

## 2020-01-24 DIAGNOSIS — E05.90 SUBCLINICAL HYPERTHYROIDISM: ICD-10-CM

## 2020-01-24 DIAGNOSIS — E66.811 CLASS 1 OBESITY WITH SERIOUS COMORBIDITY AND BODY MASS INDEX (BMI) OF 32.0 TO 32.9 IN ADULT, UNSPECIFIED OBESITY TYPE: ICD-10-CM

## 2020-01-24 DIAGNOSIS — Z15.89 MTHFR MUTATION: ICD-10-CM

## 2020-01-24 DIAGNOSIS — K51.30 ULCERATIVE RECTOSIGMOIDITIS WITHOUT COMPLICATION (H): ICD-10-CM

## 2020-01-24 DIAGNOSIS — Z86.718 HISTORY OF DEEP VENOUS THROMBOSIS: ICD-10-CM

## 2020-01-24 DIAGNOSIS — E78.5 HYPERLIPIDEMIA LDL GOAL <130: ICD-10-CM

## 2020-01-24 DIAGNOSIS — M85.89 OSTEOPENIA OF MULTIPLE SITES: ICD-10-CM

## 2020-01-24 DIAGNOSIS — R79.89 ABNORMAL TSH: Primary | ICD-10-CM

## 2020-01-24 DIAGNOSIS — Z86.711 HISTORY OF PULMONARY EMBOLISM: ICD-10-CM

## 2020-01-24 DIAGNOSIS — Z51.81 MEDICATION MONITORING ENCOUNTER: ICD-10-CM

## 2020-01-24 PROCEDURE — 84480 ASSAY TRIIODOTHYRONINE (T3): CPT | Performed by: FAMILY MEDICINE

## 2020-01-24 PROCEDURE — 36415 COLL VENOUS BLD VENIPUNCTURE: CPT | Performed by: FAMILY MEDICINE

## 2020-01-24 PROCEDURE — 84439 ASSAY OF FREE THYROXINE: CPT | Performed by: FAMILY MEDICINE

## 2020-01-24 PROCEDURE — 99214 OFFICE O/P EST MOD 30 MIN: CPT | Performed by: FAMILY MEDICINE

## 2020-01-24 PROCEDURE — 84443 ASSAY THYROID STIM HORMONE: CPT | Performed by: FAMILY MEDICINE

## 2020-01-24 ASSESSMENT — MIFFLIN-ST. JEOR: SCORE: 1156.85

## 2020-01-24 NOTE — PROGRESS NOTES
SUBJECTIVE    Jael Car is a 69 year old female who presents to clinic today for the following health issues:    Discuss thyroid results    TSH   Date Value Ref Range Status   01/24/2020 0.27 (L) 0.40 - 4.00 mU/L Final     Thyrotropin receptor <1.00    Free T4 - 1.20    Thyroid uptake scan - normal     UC - on colozal - no issues - followed by [facility]    MTHFR - no clotting issues, hx DVT/PE    Lipids    Recent Labs   Lab Test 12/18/19  1019 08/14/19  1035  08/14/15  0913 07/30/14  0828   CHOL 162 153   < > 156 161   HDL 45* 40*   < > 55 47*   LDL 87 89   < > 78 87   TRIG 149 122   < > 116 137   CHOLHDLRATIO  --   --   --  2.8 3.4    < > = values in this interval not displayed.     Reviewed and updated as needed this visit by Provider    BP Readings from Last 3 Encounters:   01/24/20 139/84   12/27/19 (!) 144/90   12/18/19 (!) 160/92     body mass index is 32.11 kg/m .    Wt Readings from Last 4 Encounters:   01/24/20 72.1 kg (159 lb)   12/27/19 74 kg (163 lb 3.2 oz)   12/18/19 72.1 kg (159 lb)   08/14/19 74.6 kg (164 lb 6.4 oz)       Health Maintenance    Health Maintenance Due   Topic Date Due     DTAP/TDAP/TD IMMUNIZATION (1 - Tdap) 04/29/1962     ZOSTER IMMUNIZATION (2 of 3) 09/19/2013     PNEUMOCOCCAL IMMUNIZATION 65+ LOW/MEDIUM RISK (1 of 2 - PCV13) 04/29/2016     DEXA  10/26/2019     PHQ-2  01/01/2020       Current Problem List    Patient Active Problem List   Diagnosis     Chondromalacia of patella     Hyperlipidemia LDL goal <130     Lumbago     S/P shoulder surgery     Advanced directives, counseling/discussion     Rotator cuff (capsule) sprain     Osteopenia     Carpal tunnel syndrome     Hyperglycemia     Subclinical hyperthyroidism     Osteoarthrosis of knee     History of pulmonary embolism     Primary osteoarthritis of right knee     MTHFR mutation (H)     History of deep venous thrombosis     Elevated serum protein level     Ulcerative rectosigmoiditis 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 (H)     Microscopic  hematuria     Colon polyps     Primary osteoarthritis involving multiple joints     Class 1 obesity with serious comorbidity and body mass index (BMI) of 32.0 to 32.9 in adult, unspecified obesity type       Past Medical History    Past Medical History:   Diagnosis Date     Colon polyps 12/2017    tubular adenoma x 1 - 3mm - due 5 yrs     DVT (deep venous thrombosis) (H) 2011    PE - postop     Elevated serum protein level     Protein S     History of thrombophlebitis      Hyperlipidemia LDL goal < 130 2006     Microscopic hematuria 2019    work up negative - Dr Redmond     MTHFR mutation (H)      Mumps      Osteoarthrosis of knee 2014    Dr Augustine     Osteopenia      Pulmonary emboli (H) 2011    Post op     Pulmonary embolism and infarction (H) 10/5/2011     Problem list name updated by automated process. 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    [facility] - Dr Telles/Paco - left sided       Past Surgical History    Past Surgical History:   Procedure Laterality Date     COLONOSCOPY  09, 9/15, 12/17    colitis, tubular adenoma x 1 - 3mm - diverticulosis - due 5 yrs     FLEXIBLE SIGMOIDOSCOPY  12/2018    benign, quiscient UC     SIGMOIDOSCOPY FLEXIBLE  09/2017    rectosigmoiditis     SURGICAL HISTORY OF -  Right 2011    Rt Rotator Cuff       Current Medications    Current Outpatient Medications   Medication Sig Dispense Refill     aspirin EC 81 MG tablet Take 81 mg by mouth daily.       atorvastatin (LIPITOR) 20 MG tablet Take 1 tablet (20 mg) by mouth daily 90 tablet 3     balsalazide (COLAZAL) 750 MG capsule Take 4 capsules (3,000 mg) by mouth 2 times daily       Calcium-Phosphorus-Vitamin D (CALCIUM GUMMIES) 250-100-500 MG-MG-UNIT CHEW Take by mouth 2 times daily       cholecalciferol (VITAMIN D3) 5000 UNITS CAPS capsule Take by mouth every other day       multivitamin  peds with iron (FLINTSTONES COMPLETE) 60 MG chewable tablet Take 1  chew tab by mouth daily.         Allergies    No Known Allergies    Immunizations    Immunization History   Administered Date(s) Administered     Zoster vaccine, live 07/25/2013       Family History    Family History   Problem Relation Age of Onset     Alcohol/Drug Father      Lipids Father         high cholesterol     Diabetes Paternal Grandmother      Diabetes Son         diet controlled     Lung Cancer Maternal Grandfather         smoker     Coronary Artery Disease Brother        Social History    Social History     Socioeconomic History     Marital status:      Spouse name: Anand     Number of children: 1     Years of education: 12     Highest education level: Not on file   Occupational History     Not on file   Social Needs     Financial resource strain: Not on file     Food insecurity:     Worry: Not on file     Inability: Not on file     Transportation needs:     Medical: Not on file     Non-medical: Not on file   Tobacco Use     Smoking status: Never Smoker     Smokeless tobacco: Never Used     Tobacco comment: 30 years ago   Substance and Sexual Activity     Alcohol use: Yes     Alcohol/week: 2.0 - 4.0 standard drinks     Comment: 1-2 drinks about 2 times/week     Drug use: Never     Sexual activity: Not Currently     Partners: Male     Birth control/protection: None   Lifestyle     Physical activity:     Days per week: Not on file     Minutes per session: Not on file     Stress: Not on file   Relationships     Social connections:     Talks on phone: Not on file     Gets together: Not on file     Attends Yarsani service: Not on file     Active member of club or organization: Not on file     Attends meetings of clubs or organizations: Not on file     Relationship status: Not on file     Intimate partner violence:     Fear of current or ex partner: Not on file     Emotionally abused: Not on file     Physically abused: Not on file     Forced sexual activity: Not on file   Other Topics Concern      "Parent/sibling w/ CABG, MI or angioplasty before 65F 55M? No      Service Not Asked     Blood Transfusions Not Asked     Caffeine Concern Yes     Comment: 2-4 cans per week     Occupational Exposure Not Asked     Hobby Hazards Not Asked     Sleep Concern Not Asked     Stress Concern Not Asked     Weight Concern Not Asked     Special Diet Not Asked     Back Care Not Asked     Exercise Yes     Comment: 6,000 to 9,000 steps daily     Bike Helmet Not Asked     Seat Belt Yes     Self-Exams Not Asked   Social History Narrative     Not on file       All above reviewed and updated, all stable unless otherwise noted    Recent labs reviewed    ROS    CONSTITUTIONAL: NEGATIVE for fever, chills, change in weight  INTEGUMENTARY/SKIN: NEGATIVE for worrisome rashes, moles or lesions  EYES: NEGATIVE for vision changes or irritation  ENT/MOUTH: NEGATIVE for ear, mouth and throat problems  RESP: NEGATIVE for significant cough or SOB  CV: NEGATIVE for chest pain, palpitations or peripheral edema  GI: NEGATIVE for nausea, abdominal pain, heartburn, or change in bowel habits  : NEGATIVE for frequency, dysuria, or hematuria  MUSCULOSKELETAL: NEGATIVE for significant arthralgias or myalgia  NEURO: NEGATIVE for weakness, dizziness or paresthesias  ENDOCRINE: NEGATIVE for temperature intolerance, skin/hair changes  HEME: NEGATIVE for bleeding problems  PSYCHIATRIC: NEGATIVE for changes in mood or affect    OBJECTIVE    /84   Pulse 109   Temp 98.7  F (37.1  C) (Oral)   Ht 1.499 m (4' 11\")   Wt 72.1 kg (159 lb)   LMP 06/03/2004   SpO2 94%   BMI 32.11 kg/m    Body mass index is 32.11 kg/m .  GENERAL: healthy, alert and no distress  EYES: Eyes grossly normal to inspection, extraocular movements - intact, and PERRL  HENT: ear canals- normal; TMs- normal; Nose- normal; Mouth- no ulcers, no lesions  NECK: no tenderness, no adenopathy, no asymmetry, no masses, no stiffness; thyroid- normal to palpation  RESP: lungs clear to " auscultation - no rales, no rhonchi, no wheezes  CV: regular rates and rhythm, normal S1 S2, no S3 or S4 and no murmur, no click or rub -  ABDOMEN: soft, no tenderness, no  hepatosplenomegaly, no masses, normal bowel sounds  MS: extremities- no gross deformities noted, no edema  SKIN: no suspicious lesions, no rashes  NEURO: strength and tone- normal, sensory exam- grossly normal, mentation- intact, speech- normal, reflexes- symmetric  BACK: no CVA tenderness, no paralumbar tenderness  PSYCH: Alert and oriented times 3; speech- coherent , normal rate and volume; able to articulate logical thoughts, able to abstract reason, no tangential thoughts, no hallucinations or delusions, affect- normal  LYMPHATICS: no cervical adenopathy    DIAGNOSTICS/PROCEDURE    Reviewed recent labs and imaging, thyroid labs pending     ASSESSMENT      ICD-10-CM    1. Abnormal TSH R79.89 TSH     T4 free     T3, total   2. Subclinical hyperthyroidism E05.90 TSH     T4 free     T3, total   3. Ulcerative rectosigmoiditis 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 (H) K51.30    4. MTHFR mutation (H) E72.12    5. History of deep venous thrombosis Z86.718    6. History of pulmonary embolism Z86.711    7. Hyperlipidemia LDL goal <130 E78.5    8. Osteopenia of multiple sites M85.89    9. Class 1 obesity with serious comorbidity and body mass index (BMI) of 32.0 to 32.9 in adult, unspecified obesity type E66.9     Z68.32    10. Medication monitoring encounter Z51.81        PLAN    Discussed treatment/modality options, including risk and benefits she desires:     1) Labs    2) Consider Dr Everett consult    All diagnosis above reviewed and noted above, otherwise stable.  See [facility] orders for further details.     Return in about 3 months (around 4/24/2020), or if symptoms worsen or fail to improve, for Medication Recheck Visit, Follow Up Chronic.    Health Maintenance Due   Topic Date Due     DTAP/TDAP/TD IMMUNIZATION (1 - Tdap) 04/29/1962     ZOSTER IMMUNIZATION (2 of  3) 09/19/2013     PNEUMOCOCCAL IMMUNIZATION 65+ LOW/MEDIUM RISK (1 of 2 - PCV13) 04/29/2016     DEXA  10/26/2019     PHQ-2  01/01/2020       See Patient Instructions    Return in about 3 months (around 4/24/2020), or if symptoms worsen or fail to improve, for Medication Recheck Visit, Follow Up Chronic.           Ken Gardiner MD, FAAFP     [facility]  [address] [place] [address]  [email or web address]   Office: [phone]  Fax: [phone]  Pager: [phone]

## 2020-01-25 LAB
T3 SERPL-MCNC: 129 NG/DL (ref 60–181)
T4 FREE SERPL-MCNC: 1.12 NG/DL (ref 0.76–1.46)
TSH SERPL DL<=0.005 MIU/L-ACNC: 0.27 MU/L (ref 0.4–4)

## 2020-01-27 DIAGNOSIS — E05.90 SUBCLINICAL HYPERTHYROIDISM: Primary | ICD-10-CM

## 2020-07-08 ENCOUNTER — ANCILLARY PROCEDURE (OUTPATIENT)
Dept: BONE DENSITY | Facility: CLINIC | Age: 70
End: 2020-07-08
Attending: FAMILY MEDICINE
Payer: COMMERCIAL

## 2020-07-08 DIAGNOSIS — M85.89 OSTEOPENIA OF MULTIPLE SITES: ICD-10-CM

## 2020-07-08 PROCEDURE — 77080 DXA BONE DENSITY AXIAL: CPT | Performed by: INTERNAL MEDICINE

## 2020-09-11 ENCOUNTER — MYC MEDICAL ADVICE (OUTPATIENT)
Dept: FAMILY MEDICINE | Facility: CLINIC | Age: 70
End: 2020-09-11

## 2020-09-11 DIAGNOSIS — Z86.718 HISTORY OF DEEP VENOUS THROMBOSIS: ICD-10-CM

## 2020-09-11 DIAGNOSIS — Z12.11 SCREEN FOR COLON CANCER: ICD-10-CM

## 2020-09-11 DIAGNOSIS — Z00.00 ENCOUNTER FOR ROUTINE ADULT HEALTH EXAMINATION WITHOUT ABNORMAL FINDINGS: Primary | ICD-10-CM

## 2020-09-11 DIAGNOSIS — E78.5 HYPERLIPIDEMIA LDL GOAL <130: ICD-10-CM

## 2020-09-11 DIAGNOSIS — E05.90 SUBCLINICAL HYPERTHYROIDISM: ICD-10-CM

## 2020-09-11 DIAGNOSIS — M15.0 PRIMARY OSTEOARTHRITIS INVOLVING MULTIPLE JOINTS: ICD-10-CM

## 2020-09-11 DIAGNOSIS — Z86.711 HISTORY OF PULMONARY EMBOLISM: ICD-10-CM

## 2020-09-11 DIAGNOSIS — R31.29 MICROSCOPIC HEMATURIA: ICD-10-CM

## 2020-09-11 DIAGNOSIS — K63.5 POLYP OF COLON, UNSPECIFIED PART OF COLON, UNSPECIFIED TYPE: ICD-10-CM

## 2020-09-11 DIAGNOSIS — Z15.89 MTHFR MUTATION: ICD-10-CM

## 2020-09-11 DIAGNOSIS — M85.89 OSTEOPENIA OF MULTIPLE SITES: ICD-10-CM

## 2020-09-11 DIAGNOSIS — Z51.81 MEDICATION MONITORING ENCOUNTER: ICD-10-CM

## 2020-09-11 DIAGNOSIS — K51.30 ULCERATIVE RECTOSIGMOIDITIS WITHOUT COMPLICATION (H): ICD-10-CM

## 2020-09-12 RX ORDER — ATORVASTATIN CALCIUM 20 MG/1
20 TABLET, FILM COATED ORAL DAILY
Qty: 90 TABLET | Refills: 3 | Status: SHIPPED | OUTPATIENT
Start: 2020-09-12 | End: 2020-09-23

## 2020-09-23 ENCOUNTER — MYC MEDICAL ADVICE (OUTPATIENT)
Dept: FAMILY MEDICINE | Facility: CLINIC | Age: 70
End: 2020-09-23

## 2020-09-23 DIAGNOSIS — M85.89 OSTEOPENIA OF MULTIPLE SITES: ICD-10-CM

## 2020-09-23 DIAGNOSIS — E05.90 SUBCLINICAL HYPERTHYROIDISM: ICD-10-CM

## 2020-09-23 DIAGNOSIS — K63.5 POLYP OF COLON, UNSPECIFIED PART OF COLON, UNSPECIFIED TYPE: ICD-10-CM

## 2020-09-23 DIAGNOSIS — Z86.718 HISTORY OF DEEP VENOUS THROMBOSIS: ICD-10-CM

## 2020-09-23 DIAGNOSIS — Z86.711 HISTORY OF PULMONARY EMBOLISM: ICD-10-CM

## 2020-09-23 DIAGNOSIS — E78.5 HYPERLIPIDEMIA LDL GOAL <130: ICD-10-CM

## 2020-09-23 DIAGNOSIS — Z15.89 MTHFR MUTATION: ICD-10-CM

## 2020-09-23 DIAGNOSIS — R31.29 MICROSCOPIC HEMATURIA: ICD-10-CM

## 2020-09-23 DIAGNOSIS — K51.30 ULCERATIVE RECTOSIGMOIDITIS WITHOUT COMPLICATION (H): ICD-10-CM

## 2020-09-23 DIAGNOSIS — Z12.11 SCREEN FOR COLON CANCER: ICD-10-CM

## 2020-09-23 DIAGNOSIS — M17.11 PRIMARY OSTEOARTHRITIS OF RIGHT KNEE: ICD-10-CM

## 2020-09-23 DIAGNOSIS — Z00.00 ENCOUNTER FOR ROUTINE ADULT HEALTH EXAMINATION WITHOUT ABNORMAL FINDINGS: Primary | ICD-10-CM

## 2020-09-23 DIAGNOSIS — M15.0 PRIMARY OSTEOARTHRITIS INVOLVING MULTIPLE JOINTS: ICD-10-CM

## 2020-09-23 DIAGNOSIS — Z51.81 MEDICATION MONITORING ENCOUNTER: ICD-10-CM

## 2020-09-23 RX ORDER — ATORVASTATIN CALCIUM 20 MG/1
20 TABLET, FILM COATED ORAL DAILY
Qty: 90 TABLET | Refills: 3 | Status: SHIPPED | OUTPATIENT
Start: 2020-09-23 | End: 2020-10-26

## 2020-10-20 DIAGNOSIS — Z00.00 ENCOUNTER FOR ROUTINE ADULT HEALTH EXAMINATION WITHOUT ABNORMAL FINDINGS: ICD-10-CM

## 2020-10-20 DIAGNOSIS — Z12.11 SCREEN FOR COLON CANCER: ICD-10-CM

## 2020-10-20 DIAGNOSIS — K63.5 POLYP OF COLON, UNSPECIFIED PART OF COLON, UNSPECIFIED TYPE: ICD-10-CM

## 2020-10-20 PROCEDURE — 82274 ASSAY TEST FOR BLOOD FECAL: CPT | Performed by: FAMILY MEDICINE

## 2020-10-21 ENCOUNTER — MYC REFILL (OUTPATIENT)
Dept: FAMILY MEDICINE | Facility: CLINIC | Age: 70
End: 2020-10-21

## 2020-10-21 DIAGNOSIS — Z00.00 ENCOUNTER FOR ROUTINE ADULT HEALTH EXAMINATION WITHOUT ABNORMAL FINDINGS: ICD-10-CM

## 2020-10-21 DIAGNOSIS — M85.89 OSTEOPENIA OF MULTIPLE SITES: ICD-10-CM

## 2020-10-21 DIAGNOSIS — Z15.89 MTHFR MUTATION: ICD-10-CM

## 2020-10-21 DIAGNOSIS — Z86.711 HISTORY OF PULMONARY EMBOLISM: ICD-10-CM

## 2020-10-21 DIAGNOSIS — E78.5 HYPERLIPIDEMIA LDL GOAL <130: ICD-10-CM

## 2020-10-21 DIAGNOSIS — R31.29 MICROSCOPIC HEMATURIA: ICD-10-CM

## 2020-10-21 DIAGNOSIS — K51.30 ULCERATIVE RECTOSIGMOIDITIS WITHOUT COMPLICATION (H): ICD-10-CM

## 2020-10-21 DIAGNOSIS — Z51.81 MEDICATION MONITORING ENCOUNTER: ICD-10-CM

## 2020-10-21 DIAGNOSIS — Z86.718 HISTORY OF DEEP VENOUS THROMBOSIS: ICD-10-CM

## 2020-10-21 DIAGNOSIS — E05.90 SUBCLINICAL HYPERTHYROIDISM: ICD-10-CM

## 2020-10-21 LAB
ALBUMIN UR-MCNC: ABNORMAL MG/DL
APPEARANCE UR: CLEAR
BACTERIA #/AREA URNS HPF: ABNORMAL /HPF
BILIRUB UR QL STRIP: NEGATIVE
COLOR UR AUTO: YELLOW
DEPRECATED CALCIDIOL+CALCIFEROL SERPL-MC: 53 UG/L (ref 20–75)
ERYTHROCYTE [DISTWIDTH] IN BLOOD BY AUTOMATED COUNT: 13.7 % (ref 10–15)
GLUCOSE UR STRIP-MCNC: NEGATIVE MG/DL
HCT VFR BLD AUTO: 42.7 % (ref 35–47)
HGB BLD-MCNC: 13.8 G/DL (ref 11.7–15.7)
HGB UR QL STRIP: ABNORMAL
KETONES UR STRIP-MCNC: NEGATIVE MG/DL
LEUKOCYTE ESTERASE UR QL STRIP: ABNORMAL
MCH RBC QN AUTO: 27.2 PG (ref 26.5–33)
MCHC RBC AUTO-ENTMCNC: 32.3 G/DL (ref 31.5–36.5)
MCV RBC AUTO: 84 FL (ref 78–100)
MUCOUS THREADS #/AREA URNS LPF: PRESENT /LPF
NITRATE UR QL: NEGATIVE
NON-SQ EPI CELLS #/AREA URNS LPF: ABNORMAL /LPF
PH UR STRIP: 6 PH (ref 5–7)
PLATELET # BLD AUTO: 262 10E9/L (ref 150–450)
RBC # BLD AUTO: 5.07 10E12/L (ref 3.8–5.2)
RBC #/AREA URNS AUTO: ABNORMAL /HPF
SOURCE: ABNORMAL
SP GR UR STRIP: 1.02 (ref 1–1.03)
UROBILINOGEN UR STRIP-ACNC: 0.2 EU/DL (ref 0.2–1)
WBC # BLD AUTO: 7.2 10E9/L (ref 4–11)
WBC #/AREA URNS AUTO: ABNORMAL /HPF

## 2020-10-21 PROCEDURE — 82043 UR ALBUMIN QUANTITATIVE: CPT | Performed by: FAMILY MEDICINE

## 2020-10-21 PROCEDURE — 84443 ASSAY THYROID STIM HORMONE: CPT | Performed by: FAMILY MEDICINE

## 2020-10-21 PROCEDURE — 82550 ASSAY OF CK (CPK): CPT | Performed by: FAMILY MEDICINE

## 2020-10-21 PROCEDURE — 85027 COMPLETE CBC AUTOMATED: CPT | Performed by: FAMILY MEDICINE

## 2020-10-21 PROCEDURE — 82306 VITAMIN D 25 HYDROXY: CPT | Performed by: FAMILY MEDICINE

## 2020-10-21 PROCEDURE — 84439 ASSAY OF FREE THYROXINE: CPT | Performed by: FAMILY MEDICINE

## 2020-10-21 PROCEDURE — 36415 COLL VENOUS BLD VENIPUNCTURE: CPT | Performed by: FAMILY MEDICINE

## 2020-10-21 PROCEDURE — 80061 LIPID PANEL: CPT | Performed by: FAMILY MEDICINE

## 2020-10-21 PROCEDURE — 81001 URINALYSIS AUTO W/SCOPE: CPT | Performed by: FAMILY MEDICINE

## 2020-10-21 PROCEDURE — 80053 COMPREHEN METABOLIC PANEL: CPT | Performed by: FAMILY MEDICINE

## 2020-10-21 RX ORDER — ATORVASTATIN CALCIUM 20 MG/1
20 TABLET, FILM COATED ORAL DAILY
Qty: 90 TABLET | Refills: 3 | OUTPATIENT
Start: 2020-10-21

## 2020-10-21 NOTE — TELEPHONE ENCOUNTER
Refills on file at pharmacy    Chito HERNANDEZ RN   [facility]

## 2020-10-22 LAB
ALBUMIN SERPL-MCNC: 3.6 G/DL (ref 3.4–5)
ALP SERPL-CCNC: 134 U/L (ref 40–150)
ALT SERPL W P-5'-P-CCNC: 19 U/L (ref 0–50)
ANION GAP SERPL CALCULATED.3IONS-SCNC: 2 MMOL/L (ref 3–14)
AST SERPL W P-5'-P-CCNC: 19 U/L (ref 0–45)
BILIRUB SERPL-MCNC: 0.4 MG/DL (ref 0.2–1.3)
BUN SERPL-MCNC: 14 MG/DL (ref 7–30)
CALCIUM SERPL-MCNC: 9.5 MG/DL (ref 8.5–10.1)
CHLORIDE SERPL-SCNC: 108 MMOL/L (ref 94–109)
CHOLEST SERPL-MCNC: 151 MG/DL
CK SERPL-CCNC: 36 U/L (ref 30–225)
CO2 SERPL-SCNC: 31 MMOL/L (ref 20–32)
CREAT SERPL-MCNC: 0.63 MG/DL (ref 0.52–1.04)
CREAT UR-MCNC: 183 MG/DL
GFR SERPL CREATININE-BSD FRML MDRD: >90 ML/MIN/{1.73_M2}
GLUCOSE SERPL-MCNC: 111 MG/DL (ref 70–99)
HDLC SERPL-MCNC: 43 MG/DL
LDLC SERPL CALC-MCNC: 81 MG/DL
MICROALBUMIN UR-MCNC: 59 MG/L
MICROALBUMIN/CREAT UR: 32.35 MG/G CR (ref 0–25)
NONHDLC SERPL-MCNC: 108 MG/DL
POTASSIUM SERPL-SCNC: 3.5 MMOL/L (ref 3.4–5.3)
PROT SERPL-MCNC: 7.7 G/DL (ref 6.8–8.8)
SODIUM SERPL-SCNC: 141 MMOL/L (ref 133–144)
T4 FREE SERPL-MCNC: 1.3 NG/DL (ref 0.76–1.46)
TRIGL SERPL-MCNC: 133 MG/DL
TSH SERPL DL<=0.005 MIU/L-ACNC: 0.67 MU/L (ref 0.4–4)

## 2020-10-24 PROBLEM — R73.03 PREDIABETES: Status: ACTIVE | Noted: 2020-01-01

## 2020-10-26 ENCOUNTER — MYC REFILL (OUTPATIENT)
Dept: FAMILY MEDICINE | Facility: CLINIC | Age: 70
End: 2020-10-26

## 2020-10-26 DIAGNOSIS — E78.5 HYPERLIPIDEMIA LDL GOAL <130: ICD-10-CM

## 2020-10-27 LAB — HEMOCCULT STL QL IA: NEGATIVE

## 2020-10-28 DIAGNOSIS — R73.9 ELEVATED BLOOD SUGAR: Primary | ICD-10-CM

## 2020-10-28 RX ORDER — ATORVASTATIN CALCIUM 20 MG/1
20 TABLET, FILM COATED ORAL DAILY
Qty: 30 TABLET | Refills: 0 | Status: SHIPPED | OUTPATIENT
Start: 2020-10-28 | End: 2020-10-28

## 2020-10-28 RX ORDER — ATORVASTATIN CALCIUM 20 MG/1
20 TABLET, FILM COATED ORAL DAILY
Qty: 90 TABLET | Refills: 0 | Status: SHIPPED | OUTPATIENT
Start: 2020-10-28 | End: 2021-01-20

## 2020-10-28 NOTE — TELEPHONE ENCOUNTER
Due for an Office visit for further refills, only fill for 30 days     Katerina Kennedy RN, BSN  [facility]

## 2020-11-13 ENCOUNTER — HOSPITAL ENCOUNTER (OUTPATIENT)
Dept: MAMMOGRAPHY | Facility: CLINIC | Age: 70
Discharge: HOME | End: 2020-11-13
Attending: FAMILY MEDICINE | Admitting: FAMILY MEDICINE
Payer: COMMERCIAL

## 2020-11-13 DIAGNOSIS — Z12.31 ENCOUNTER FOR MAMMOGRAM TO ESTABLISH BASELINE MAMMOGRAM: ICD-10-CM

## 2020-11-13 PROCEDURE — 77067 SCR MAMMO BI INCL CAD: CPT

## 2020-11-24 ENCOUNTER — TRANSFERRED RECORDS (OUTPATIENT)
Dept: HEALTH INFORMATION MANAGEMENT | Facility: CLINIC | Age: 70
End: 2020-11-24

## 2020-11-24 LAB — CREAT SERPL-MCNC: 0.69 MG/DL (ref 0.6–1.02)

## 2020-12-10 ENCOUNTER — MEDICAL CORRESPONDENCE (OUTPATIENT)
Dept: HEALTH INFORMATION MANAGEMENT | Facility: CLINIC | Age: 70
End: 2020-12-10

## 2020-12-10 ENCOUNTER — TRANSFERRED RECORDS (OUTPATIENT)
Dept: HEALTH INFORMATION MANAGEMENT | Facility: CLINIC | Age: 70
End: 2020-12-10

## 2020-12-16 DIAGNOSIS — Z51.81 MEDICATION MONITORING ENCOUNTER: ICD-10-CM

## 2020-12-16 DIAGNOSIS — M85.89 OSTEOPENIA OF MULTIPLE SITES: ICD-10-CM

## 2020-12-16 DIAGNOSIS — R73.9 ELEVATED BLOOD SUGAR: ICD-10-CM

## 2020-12-16 DIAGNOSIS — Z00.00 ENCOUNTER FOR ROUTINE ADULT HEALTH EXAMINATION WITHOUT ABNORMAL FINDINGS: ICD-10-CM

## 2020-12-16 DIAGNOSIS — R31.29 MICROSCOPIC HEMATURIA: ICD-10-CM

## 2020-12-16 DIAGNOSIS — E78.5 HYPERLIPIDEMIA LDL GOAL <130: ICD-10-CM

## 2020-12-16 DIAGNOSIS — E05.90 SUBCLINICAL HYPERTHYROIDISM: ICD-10-CM

## 2020-12-16 DIAGNOSIS — Z15.89 MTHFR MUTATION: ICD-10-CM

## 2020-12-16 DIAGNOSIS — K51.30 ULCERATIVE RECTOSIGMOIDITIS WITHOUT COMPLICATION (H): ICD-10-CM

## 2020-12-16 LAB
ALBUMIN SERPL-MCNC: 3.5 G/DL (ref 3.4–5)
ALP SERPL-CCNC: 135 U/L (ref 40–150)
ALT SERPL W P-5'-P-CCNC: 18 U/L (ref 0–50)
ANION GAP SERPL CALCULATED.3IONS-SCNC: 4 MMOL/L (ref 3–14)
AST SERPL W P-5'-P-CCNC: 21 U/L (ref 0–45)
BILIRUB SERPL-MCNC: 0.5 MG/DL (ref 0.2–1.3)
BUN SERPL-MCNC: 16 MG/DL (ref 7–30)
CALCIUM SERPL-MCNC: 8.7 MG/DL (ref 8.5–10.1)
CHLORIDE SERPL-SCNC: 108 MMOL/L (ref 94–109)
CHOLEST SERPL-MCNC: 129 MG/DL
CK SERPL-CCNC: 54 U/L (ref 30–225)
CO2 SERPL-SCNC: 27 MMOL/L (ref 20–32)
CREAT SERPL-MCNC: 0.61 MG/DL (ref 0.52–1.04)
DEPRECATED CALCIDIOL+CALCIFEROL SERPL-MC: 56 UG/L (ref 20–75)
ERYTHROCYTE [DISTWIDTH] IN BLOOD BY AUTOMATED COUNT: 13.2 % (ref 10–15)
GFR SERPL CREATININE-BSD FRML MDRD: >90 ML/MIN/{1.73_M2}
GLUCOSE SERPL-MCNC: 104 MG/DL (ref 70–99)
HBA1C MFR BLD: 5.8 % (ref 0–5.6)
HCT VFR BLD AUTO: 41.1 % (ref 35–47)
HDLC SERPL-MCNC: 43 MG/DL
HGB BLD-MCNC: 13.5 G/DL (ref 11.7–15.7)
LDLC SERPL CALC-MCNC: 66 MG/DL
MCH RBC QN AUTO: 27.4 PG (ref 26.5–33)
MCHC RBC AUTO-ENTMCNC: 32.8 G/DL (ref 31.5–36.5)
MCV RBC AUTO: 83 FL (ref 78–100)
NONHDLC SERPL-MCNC: 86 MG/DL
PLATELET # BLD AUTO: 246 10E9/L (ref 150–450)
POTASSIUM SERPL-SCNC: 3.5 MMOL/L (ref 3.4–5.3)
PROT SERPL-MCNC: 7.6 G/DL (ref 6.8–8.8)
RBC # BLD AUTO: 4.93 10E12/L (ref 3.8–5.2)
SODIUM SERPL-SCNC: 139 MMOL/L (ref 133–144)
T4 FREE SERPL-MCNC: 1.26 NG/DL (ref 0.76–1.46)
TRIGL SERPL-MCNC: 101 MG/DL
TSH SERPL DL<=0.005 MIU/L-ACNC: 0.29 MU/L (ref 0.4–4)
WBC # BLD AUTO: 7.2 10E9/L (ref 4–11)

## 2020-12-16 PROCEDURE — 84443 ASSAY THYROID STIM HORMONE: CPT | Performed by: FAMILY MEDICINE

## 2020-12-16 PROCEDURE — 80061 LIPID PANEL: CPT | Performed by: FAMILY MEDICINE

## 2020-12-16 PROCEDURE — 83036 HEMOGLOBIN GLYCOSYLATED A1C: CPT | Performed by: FAMILY MEDICINE

## 2020-12-16 PROCEDURE — 80053 COMPREHEN METABOLIC PANEL: CPT | Performed by: FAMILY MEDICINE

## 2020-12-16 PROCEDURE — 85027 COMPLETE CBC AUTOMATED: CPT | Performed by: FAMILY MEDICINE

## 2020-12-16 PROCEDURE — 82550 ASSAY OF CK (CPK): CPT | Performed by: FAMILY MEDICINE

## 2020-12-16 PROCEDURE — 84439 ASSAY OF FREE THYROXINE: CPT | Performed by: FAMILY MEDICINE

## 2020-12-16 PROCEDURE — 82306 VITAMIN D 25 HYDROXY: CPT | Performed by: FAMILY MEDICINE

## 2020-12-16 PROCEDURE — 36415 COLL VENOUS BLD VENIPUNCTURE: CPT | Performed by: FAMILY MEDICINE

## 2020-12-22 ENCOUNTER — TRANSFERRED RECORDS (OUTPATIENT)
Dept: HEALTH INFORMATION MANAGEMENT | Facility: CLINIC | Age: 70
End: 2020-12-22

## 2021-01-15 ENCOUNTER — HEALTH MAINTENANCE LETTER (OUTPATIENT)
Age: 71
End: 2021-01-15

## 2021-01-15 ASSESSMENT — ACTIVITIES OF DAILY LIVING (ADL): CURRENT_FUNCTION: NO ASSISTANCE NEEDED

## 2021-01-20 ENCOUNTER — OFFICE VISIT (OUTPATIENT)
Dept: FAMILY MEDICINE | Facility: CLINIC | Age: 71
End: 2021-01-20
Payer: COMMERCIAL

## 2021-01-20 ENCOUNTER — ANCILLARY PROCEDURE (OUTPATIENT)
Dept: GENERAL RADIOLOGY | Facility: CLINIC | Age: 71
End: 2021-01-20
Attending: FAMILY MEDICINE
Payer: COMMERCIAL

## 2021-01-20 VITALS
DIASTOLIC BLOOD PRESSURE: 78 MMHG | SYSTOLIC BLOOD PRESSURE: 134 MMHG | WEIGHT: 159 LBS | TEMPERATURE: 97.6 F | BODY MASS INDEX: 32.05 KG/M2 | HEIGHT: 59 IN | OXYGEN SATURATION: 99 % | HEART RATE: 107 BPM

## 2021-01-20 DIAGNOSIS — Z00.00 ENCOUNTER FOR ROUTINE ADULT HEALTH EXAMINATION WITHOUT ABNORMAL FINDINGS: Primary | ICD-10-CM

## 2021-01-20 DIAGNOSIS — Z86.718 HISTORY OF DEEP VENOUS THROMBOSIS: ICD-10-CM

## 2021-01-20 DIAGNOSIS — K63.5 POLYP OF COLON, UNSPECIFIED PART OF COLON, UNSPECIFIED TYPE: ICD-10-CM

## 2021-01-20 DIAGNOSIS — M15.0 PRIMARY OSTEOARTHRITIS INVOLVING MULTIPLE JOINTS: ICD-10-CM

## 2021-01-20 DIAGNOSIS — K51.30 ULCERATIVE RECTOSIGMOIDITIS WITHOUT COMPLICATION (H): ICD-10-CM

## 2021-01-20 DIAGNOSIS — G89.29 CHRONIC BILATERAL LOW BACK PAIN WITHOUT SCIATICA: ICD-10-CM

## 2021-01-20 DIAGNOSIS — Z15.89 MTHFR MUTATION: ICD-10-CM

## 2021-01-20 DIAGNOSIS — M85.89 OSTEOPENIA OF MULTIPLE SITES: ICD-10-CM

## 2021-01-20 DIAGNOSIS — Z12.31 ENCOUNTER FOR SCREENING MAMMOGRAM FOR MALIGNANT NEOPLASM OF BREAST: ICD-10-CM

## 2021-01-20 DIAGNOSIS — E66.811 CLASS 1 OBESITY WITH SERIOUS COMORBIDITY AND BODY MASS INDEX (BMI) OF 32.0 TO 32.9 IN ADULT, UNSPECIFIED OBESITY TYPE: ICD-10-CM

## 2021-01-20 DIAGNOSIS — Z86.711 HISTORY OF PULMONARY EMBOLISM: ICD-10-CM

## 2021-01-20 DIAGNOSIS — Z12.11 SCREEN FOR COLON CANCER: ICD-10-CM

## 2021-01-20 DIAGNOSIS — Z51.81 MEDICATION MONITORING ENCOUNTER: ICD-10-CM

## 2021-01-20 DIAGNOSIS — E05.90 SUBCLINICAL HYPERTHYROIDISM: ICD-10-CM

## 2021-01-20 DIAGNOSIS — E78.5 HYPERLIPIDEMIA LDL GOAL <100: ICD-10-CM

## 2021-01-20 DIAGNOSIS — M54.50 CHRONIC BILATERAL LOW BACK PAIN WITHOUT SCIATICA: ICD-10-CM

## 2021-01-20 DIAGNOSIS — R73.03 PREDIABETES: ICD-10-CM

## 2021-01-20 PROCEDURE — 72100 X-RAY EXAM L-S SPINE 2/3 VWS: CPT | Mod: FY | Performed by: RADIOLOGY

## 2021-01-20 PROCEDURE — 99397 PER PM REEVAL EST PAT 65+ YR: CPT | Performed by: FAMILY MEDICINE

## 2021-01-20 RX ORDER — ATORVASTATIN CALCIUM 20 MG/1
20 TABLET, FILM COATED ORAL DAILY
Qty: 90 TABLET | Refills: 3 | Status: SHIPPED | OUTPATIENT
Start: 2021-01-20 | End: 2022-01-21

## 2021-01-20 SDOH — HEALTH STABILITY: MENTAL HEALTH: HOW OFTEN DO YOU HAVE 6 OR MORE DRINKS ON ONE OCCASION?: NOT ASKED

## 2021-01-20 SDOH — HEALTH STABILITY: MENTAL HEALTH: HOW OFTEN DO YOU HAVE A DRINK CONTAINING ALCOHOL?: NOT ASKED

## 2021-01-20 SDOH — HEALTH STABILITY: MENTAL HEALTH: HOW MANY STANDARD DRINKS CONTAINING ALCOHOL DO YOU HAVE ON A TYPICAL DAY?: NOT ASKED

## 2021-01-20 ASSESSMENT — MIFFLIN-ST. JEOR: SCORE: 1151.85

## 2021-01-20 ASSESSMENT — ACTIVITIES OF DAILY LIVING (ADL): CURRENT_FUNCTION: NO ASSISTANCE NEEDED

## 2021-01-20 NOTE — PROGRESS NOTES
"[facility]    Jael Car is a 70 year old female who presents for Preventive Visit.    Patient has been advised of split billing requirements and indicates understanding: Yes     Are you in the first 12 months of your Medicare coverage?  No    Healthy Habits:     In general, how would you rate your overall health?  Good    Frequency of exercise:  None    Do you usually eat at least 4 servings of fruit and vegetables a day, include whole grains    & fiber and avoid regularly eating high fat or \"junk\" foods?  No    Taking medications regularly:  Yes    Medication side effects:  None    Ability to successfully perform activities of daily living:  No assistance needed    Home Safety:  No safety concerns identified    Hearing Impairment:  No hearing concerns    In the past 6 months, have you been bothered by leaking of urine? Yes    In general, how would you rate your overall mental or emotional health?  Good      PHQ-2 Total Score: 0    Additional concerns today:  Yes    lower back sharp pains that shoot side to side with movement X many months - no known injury    Do you feel safe in your environment? Yes    Have you ever done Advance Care Planning? (For example, a Health Directive, POLST, or a discussion with a medical provider or your loved ones about your wishes): Yes, patient states has an Advance Care Planning document and will bring a copy to the clinic.      Fall risk  Fallen 2 or more times in the past year?: No  Any fall with injury in the past year?: No  Do you have sleep apnea, excessive snoring or daytime drowsiness?: no    Reviewed and updated as needed this visit by clinical staff  Tobacco  Allergies  Meds   Med Hx  Surg Hx  Fam Hx  Soc Hx        Reviewed and updated as needed this visit by Provider                Social History     Tobacco Use     Smoking status: Never Smoker     Smokeless tobacco: Never Used     Tobacco comment: 30 years ago   Substance Use " Topics     Alcohol use: Yes     Alcohol/week: 2.0 - 4.0 standard drinks     Types: 2 - 4 Standard drinks or equivalent per week     Comment: 1-2 drinks about 2 times/week     If you drink alcohol do you typically have >3 drinks per day or >7 drinks per week? No    Alcohol Use 1/20/2021   Prescreen: >3 drinks/day or >7 drinks/week? -   Prescreen: >3 drinks/day or >7 drinks/week? No     Current providers sharing in care for this patient include:   Patient Care Team:  Ken Gardiner MD as PCP - General (Family Practice)  Ken Gardiner MD as Assigned PCP  Vesna Egan RD as Diabetes Educator (Dietitian, Registered)    The following health maintenance items are reviewed in Epic and correct as of today:  Health Maintenance   Topic Date Due     FALL RISK ASSESSMENT  12/18/2020     ZOSTER IMMUNIZATION (3 of 3) 03/17/2021     MAMMO SCREENING  11/13/2021     ALT  12/16/2021     LIPID  12/16/2021     MEDICARE ANNUAL WELLNESS VISIT  01/20/2022     ANNUAL REVIEW OF HM ORDERS  01/20/2022     COLORECTAL CANCER SCREENING  12/14/2022     DEXA  07/08/2023     ADVANCE CARE PLANNING  01/20/2026     DTAP/TDAP/TD IMMUNIZATION (2 - Td) 01/20/2031     HEPATITIS C SCREENING  Completed     PHQ-2  Completed     INFLUENZA VACCINE  Addressed     Pneumococcal Vaccine: 65+ Years  Addressed     Pneumococcal Vaccine: Pediatrics (0 to 5 Years) and At-Risk Patients (6 to 64 Years)  Aged Out     IPV IMMUNIZATION  Aged Out     MENINGITIS IMMUNIZATION  Aged Out     HEPATITIS B IMMUNIZATION  Aged Out     Prediabetes     Glucose   Date Value Ref Range Status   12/16/2020 104 (H) 70 - 99 mg/dL Final     Comment:     Fasting specimen   10/21/2020 111 (H) 70 - 99 mg/dL Final     Comment:     Fasting specimen   12/18/2019 108 (H) 70 - 99 mg/dL Final     Comment:     Fasting specimen   08/14/2019 104 (H) 70 - 99 mg/dL Final     Comment:     Fasting specimen   05/08/2019 120 (H) 70 - 99 mg/dL Final     Comment:     Fasting specimen     Lab Results   Component  Value Date    A1C 5.8 12/16/2020    A1C 5.7 12/18/2019    A1C 5.6 08/14/2019    A1C 5.7 10/31/2018    A1C 5.7 08/14/2015     Lipids    Recent Labs   Lab Test 12/16/20  0917 10/21/20  0844 08/14/15  0913 08/14/15  0913 07/30/14  0828   CHOL 129 151   < > 156 161   HDL 43* 43*   < > 55 47*   LDL 66 81   < > 78 87   TRIG 101 133   < > 116 137   CHOLHDLRATIO  --   --   --  2.8 3.4    < > = values in this interval not displayed.       UC - no issues    HX PE/DVT - MTHFR - no issues    Subclinical Hypothyroid - Dr Everett soon    TSH   Date Value Ref Range Status   12/16/2020 0.29 (L) 0.40 - 4.00 mU/L Final     LBP - bilateral - with out radiation - no incontinence - worse with standing/getting in/out bed, better with rest - muscle relaxer x 1 helps - using advil with some help - worse later in the day - initial injury lifting file cabinets 15 yrs ago?    Health Maintenance     Colonoscopy:  Due 12/2022   FIT:  given              Mammogram:  Due 11/2021              PAP:  NA   DEXA:  DUE 7/2023    Health Maintenance Due   Topic Date Due     FALL RISK ASSESSMENT  12/18/2020       Current Problem List    Patient Active Problem List   Diagnosis     Chondromalacia of patella     Hyperlipidemia LDL goal <100     Lumbago     S/P shoulder surgery     Advanced directives, counseling/discussion     Rotator cuff (capsule) sprain     Osteopenia     Carpal tunnel syndrome     Hyperglycemia     Subclinical hyperthyroidism     Osteoarthrosis of knee     History of pulmonary embolism     Primary osteoarthritis of right knee     MTHFR mutation (H)     History of deep venous thrombosis     Elevated serum protein level     Ulcerative rectosigmoiditis 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 (H)     Microscopic hematuria     Colon polyps     Primary osteoarthritis involving multiple joints     Class 1 obesity with serious comorbidity and body mass index (BMI) of 32.0 to 32.9 in adult, unspecified obesity type     Prediabetes       Past Medical History    Past  Medical History:   Diagnosis Date     Colon polyps 12/2017    tubular adenoma x 1 - 3mm - due 5 yrs     DVT (deep venous thrombosis) (H) 2011    PE - postop     Elevated serum protein level     Protein S     History of thrombophlebitis      Hyperlipidemia LDL goal <100 2006     Microscopic hematuria 2019    work up negative - Dr Redmond     MTHFR mutation (H)      Mumps      Osteoarthrosis of knee 2014    Dr Augustine     Osteopenia      Prediabetes 2020     Pulmonary emboli (H) 2011    Post op     Pulmonary embolism and infarction (H) 10/05/2011     Problem list name updated by automated process. 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    [facility] - Dr Telles/Paco - left sided       Past Surgical History    Past Surgical History:   Procedure Laterality Date     COLONOSCOPY  09, 9/15, 12/17    colitis, tubular adenoma x 1 - 3mm - diverticulosis - due 5 yrs     FLEXIBLE SIGMOIDOSCOPY  12/2018    benign, quiscient UC     SIGMOIDOSCOPY FLEXIBLE  09/2017    rectosigmoiditis     SURGICAL HISTORY OF -  Right 2011    Rt Rotator Cuff       Current Medications    Current Outpatient Medications   Medication Sig Dispense Refill     aspirin EC 81 MG tablet Take 81 mg by mouth daily.       atorvastatin (LIPITOR) 20 MG tablet Take 1 tablet (20 mg) by mouth daily 90 tablet 3     balsalazide (COLAZAL) 750 MG capsule Take 4 capsules (3,000 mg) by mouth 2 times daily       cholecalciferol (VITAMIN D3) 5000 UNITS CAPS capsule Take by mouth every other day       multivitamin  peds with iron (FLINTSTONES COMPLETE) 60 MG chewable tablet Take 1 chew tab by mouth daily.         Allergies    No Known Allergies    Immunizations    Immunization History   Administered Date(s) Administered     Zoster vaccine, live 07/25/2013       Family History    Family History   Problem Relation Age of Onset     Alcohol/Drug Father      Lipids Father         high cholesterol      Diabetes Paternal Grandmother      Diabetes Son         diet controlled     Lung Cancer Maternal Grandfather         smoker     Coronary Artery Disease Brother        Social History    Social History     Socioeconomic History     Marital status:      Spouse name: Anand     Number of children: 1     Years of education: 12     Highest education level: Not on file   Occupational History     Not on file   Social Needs     Financial resource strain: Not on file     Food insecurity     Worry: Not on file     Inability: Not on file     Transportation needs     Medical: Not on file     Non-medical: Not on file   Tobacco Use     Smoking status: Never Smoker     Smokeless tobacco: Never Used     Tobacco comment: 30 years ago   Substance and Sexual Activity     Alcohol use: Yes     Alcohol/week: 2.0 - 4.0 standard drinks     Types: 2 - 4 Standard drinks or equivalent per week     Comment: 1-2 drinks about 2 times/week     Drug use: Never     Sexual activity: Not Currently     Partners: Male     Birth control/protection: None   Lifestyle     Physical activity     Days per week: Not on file     Minutes per session: Not on file     Stress: Not on file   Relationships     Social connections     Talks on phone: Not on file     Gets together: Not on file     Attends Cheondoism service: Not on file     Active member of club or organization: Not on file     Attends meetings of clubs or organizations: Not on file     Relationship status: Not on file     Intimate partner violence     Fear of current or ex partner: Not on file     Emotionally abused: Not on file     Physically abused: Not on file     Forced sexual activity: Not on file   Other Topics Concern     Parent/sibling w/ CABG, MI or angioplasty before 65F 55M? No      Service Not Asked     Blood Transfusions Not Asked     Caffeine Concern Yes     Comment: 2-4 cans per week     Occupational Exposure Not Asked     Hobby Hazards Not Asked     Sleep Concern Not Asked  "    Stress Concern Not Asked     Weight Concern Not Asked     Special Diet Not Asked     Back Care Not Asked     Exercise Yes     Comment: 6,000 to 9,000 steps daily     Bike Helmet Not Asked     Seat Belt Yes     Self-Exams Not Asked   Social History Narrative     Not on file       ROS    CONSTITUTIONAL: NEGATIVE for fever, chills, change in weight  INTEGUMENTARY/SKIN: NEGATIVE for worrisome rashes, moles or lesions  EYES: NEGATIVE for vision changes or irritation  ENT/MOUTH: NEGATIVE for ear, mouth and throat problems  RESP: NEGATIVE for significant cough or SOB  BREAST: NEGATIVE for masses, tenderness or discharge  CV: NEGATIVE for chest pain, palpitations or peripheral edema  GI: NEGATIVE for nausea, abdominal pain, heartburn, or change in bowel habits  : NEGATIVE for frequency, dysuria, or hematuria  MUSCULOSKELETAL: NEGATIVE for significant arthralgias or myalgia  NEURO: NEGATIVE for weakness, dizziness or paresthesias  ENDOCRINE: NEGATIVE for temperature intolerance, skin/hair changes  HEME: NEGATIVE for bleeding problems  PSYCHIATRIC: NEGATIVE for changes in mood or affect    OBJECTIVE    /78   Pulse 107   Temp 97.6  F (36.4  C) (Tympanic)   Ht 1.499 m (4' 11\")   Wt 72.1 kg (159 lb)   LMP 06/03/2004   SpO2 99%   BMI 32.11 kg/m   Estimated body mass index is 32.11 kg/m  as calculated from the following:    Height as of this encounter: 1.499 m (4' 11\").    Weight as of this encounter: 72.1 kg (159 lb).  EXAM:   GENERAL: healthy, alert and no distress  EYES: Eyes grossly normal to inspection, PERRL and conjunctivae and sclerae normal  HENT: ear canals and TM's normal, nose and mouth without ulcers or lesions  RESP: lungs clear to auscultation - no rales, rhonchi or wheezes  BREAST: normal without masses, tenderness or nipple discharge and no palpable axillary masses or adenopathy  CV: regular rate and rhythm, normal S1 S2, no S3 or S4, no murmur, click or rub, no peripheral edema and peripheral " pulses strong  ABDOMEN: soft, nontender, no hepatosplenomegaly, no masses and bowel sounds normal   (female): Pt declines  MS: no gross musculoskeletal defects noted, no edema  SKIN: no suspicious lesions or rashes  NEURO: Normal strength and tone, mentation intact and speech normal  PSYCH: mentation appears normal, affect normal/bright  LYMPH: no cervical, supraclavicular, axillary, or inguinal adenopathy    DIAGNOSTICS/PROCEDURES    Pending, xrays note upper lumbar spurring, increased lordosis, mild scoliosis,     ASSESSMENT      ICD-10-CM    1. Encounter for routine adult health examination without abnormal findings  Z00.00 REVIEW OF HEALTH MAINTENANCE PROTOCOL ORDERS   2. Chronic bilateral low back pain without sciatica  M54.5 XR Lumbar Spine 2/3 Views    G89.29 REVIEW OF HEALTH MAINTENANCE PROTOCOL ORDERS     Orthopedic & Spine  Referral   3. Prediabetes  R73.03 REVIEW OF HEALTH MAINTENANCE PROTOCOL ORDERS   4. Hyperlipidemia LDL goal <100  E78.5 atorvastatin (LIPITOR) 20 MG tablet     REVIEW OF HEALTH MAINTENANCE PROTOCOL ORDERS   5. Ulcerative rectosigmoiditis 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 (H)  K51.30 REVIEW OF HEALTH MAINTENANCE PROTOCOL ORDERS   6. MTHFR mutation (H)  E72.12 REVIEW OF HEALTH MAINTENANCE PROTOCOL ORDERS   7. History of deep venous thrombosis  Z86.718 REVIEW OF HEALTH MAINTENANCE PROTOCOL ORDERS   8. History of pulmonary embolism  Z86.711 REVIEW OF HEALTH MAINTENANCE PROTOCOL ORDERS   9. Subclinical hyperthyroidism  E05.90 REVIEW OF HEALTH MAINTENANCE PROTOCOL ORDERS   10. Primary osteoarthritis involving multiple joints  M89.49 REVIEW OF HEALTH MAINTENANCE PROTOCOL ORDERS   11. Osteopenia of multiple sites  M85.89 REVIEW OF HEALTH MAINTENANCE PROTOCOL ORDERS   12. Class 1 obesity with serious comorbidity and body mass index (BMI) of 32.0 to 32.9 in adult, unspecified obesity type  E66.9     Z68.32    13. Polyp of colon, unspecified part of colon, unspecified type  K63.5 REVIEW OF HEALTH  "MAINTENANCE PROTOCOL ORDERS   14. Screen for colon cancer  Z12.11 REVIEW OF HEALTH MAINTENANCE PROTOCOL ORDERS   15. Encounter for screening mammogram for malignant neoplasm of breast  Z12.31 REVIEW OF HEALTH MAINTENANCE PROTOCOL ORDERS   16. Medication monitoring encounter  Z51.81 REVIEW OF HEALTH MAINTENANCE PROTOCOL ORDERS       PLAN    Discussed treatment/modality options, including risk and benefits, she desires:    advised alcohol consumption 1oz per day or less, advised aspirin 81 mg po daily, advised 1 multivitamin per day, advised calcium 7979-4232 mg/d and Vitamin D 800-1200 IU/d, advised dentist every 6 months, advised diet, exercise, and weight loss, advised opthalmologist every 1 years, advised self breast exam q month, further health care maintenance, further lab(s), medication refill(s) and observation    All diagnosis above reviewed and noted above, otherwise stable.      See [facility] orders for further details.      1) med refills prn    2) labs refilled    3) FSOC    Return in about 1 month (around 2/20/2021), or if symptoms worsen or fail to improve, for Medication Recheck Visit, Follow Up Acute, Follow Up Chronic.    Health Maintenance Due   Topic Date Due     FALL RISK ASSESSMENT  12/18/2020       End of Life Planning:  Patient currently has an advanced directive: pending    COUNSELING    Reviewed preventive health counseling, as reflected in patient instructions    Estimated body mass index is 32.11 kg/m  as calculated from the following:    Height as of this encounter: 1.499 m (4' 11\").    Weight as of this encounter: 72.1 kg (159 lb).    Weight management plan: diet and exercise     reports that she has never smoked. She has never used smokeless tobacco.      Appropriate preventive services were discussed with this patient, including applicable screening as appropriate for cardiovascular disease, diabetes, osteopenia/osteoporosis, and glaucoma.  As appropriate for age/gender, discussed " screening for colorectal cancer, prostate cancer, breast cancer, and cervical cancer. Checklist reviewing preventive services available has been given to the patient.    Reviewed patients plan of care and provided an AVS. The Intermediate Care Plan ( asthma action plan, low back pain action plan, and migraine action plan) for Jael meets the Care Plan requirement. This Care Plan has been established and reviewed with the Patient.         Ken Gardiner MD, FAAFP     [facility]  [address] [place] [id number]  [email or web address]   Office: [phone]  Fax: [phone]  Pager: [phone]

## 2021-01-29 ENCOUNTER — OFFICE VISIT (OUTPATIENT)
Dept: ORTHOPEDICS | Facility: CLINIC | Age: 71
End: 2021-01-29
Attending: FAMILY MEDICINE
Payer: COMMERCIAL

## 2021-01-29 VITALS
DIASTOLIC BLOOD PRESSURE: 76 MMHG | WEIGHT: 160 LBS | HEIGHT: 59 IN | BODY MASS INDEX: 32.25 KG/M2 | SYSTOLIC BLOOD PRESSURE: 130 MMHG

## 2021-01-29 DIAGNOSIS — G89.29 CHRONIC BILATERAL LOW BACK PAIN WITHOUT SCIATICA: ICD-10-CM

## 2021-01-29 DIAGNOSIS — M51.369 LUMBAR DEGENERATIVE DISC DISEASE: Primary | ICD-10-CM

## 2021-01-29 DIAGNOSIS — M54.50 CHRONIC BILATERAL LOW BACK PAIN WITHOUT SCIATICA: ICD-10-CM

## 2021-01-29 PROCEDURE — 99214 OFFICE O/P EST MOD 30 MIN: CPT | Performed by: FAMILY MEDICINE

## 2021-01-29 RX ORDER — METAXALONE 800 MG/1
800 TABLET ORAL 2 TIMES DAILY
Qty: 60 TABLET | Refills: 0 | Status: SHIPPED | OUTPATIENT
Start: 2021-01-29 | End: 2021-04-23

## 2021-01-29 ASSESSMENT — MIFFLIN-ST. JEOR: SCORE: 1156.39

## 2021-01-29 NOTE — LETTER
1/29/2021         RE: Jael Car  [address]  [place] [address]        Dear Colleague,    Thank you for referring your patient, Jael Car, to the [facility] [place]. Please see a copy of my visit note below.    ASSESSMENT & PLAN  Patient Instructions     1. Lumbar degenerative disc disease    2. Chronic bilateral low back pain without sciatica      -Patient has chronic low back pain due to aggravation of multilevel lumbar degenerative disc disease and arthritis  -Patient was start formal physical therapy and home exercise program  -Patient may continue with Advil as needed.  Patient will start Skelaxin 800 mg 1-2 times a day as needed for muscle spasms.  -Patient will follow up with pain does not improve for reevaluation and further treatment.  To consider an MRI of the lumbar spine if symptoms do not improve  -Call direct clinic number [[phone]] at any time with questions or concerns.    Albert Yeo MD [facility]  [facility] [place]          -----    SUBJECTIVE  Jael Car is a/an 70 year old female who is seen in consultation at the request of  Ken Gardiner M.D. for evaluation of low back pain. The patient is seen by themselves.    Onset: October 2020, pain has remained about the same since November 2020. Reports insidious onset without acute precipitating event.  Patient states ~15 years ago she injured her back due to a work related injury. Did Physical Therapy- at [facility].   Location of Pain: bilateral low back pain, spamsing and muscle tightness, sharp pains  Rating of Pain at worst: 9/10  Rating of Pain Currently: 6/10  Worsened by: bending in the morning, getting into bed at the end of the day, and worsening pain throughout the day.   Better with: activity avoidance   Treatments tried: massage manual and theragun, Advil, rest/ activity avoidance, previous use of ice.   Quality: sharp  Red flags:  Weakness: No, bowel/bladder loss: No, foot drop: No  Associated symptoms: no distal numbness or tingling; denies swelling or warmth  Orthopedic history: UNKNOWN  Relevant surgical history: NO  Social history: social history: retired    Past Medical History:   Diagnosis Date     Colon polyps 12/2017    tubular adenoma x 1 - 3mm - due 5 yrs     DVT (deep venous thrombosis) (H) 2011    PE - postop     Elevated serum protein level     Protein S     History of thrombophlebitis      Hyperlipidemia LDL goal <100 2006     Microscopic hematuria 2019    work up negative - Dr Redmond     MTHFR mutation (H)      Mumps      Osteoarthrosis of knee 2014    Dr Augustine     Osteopenia      Prediabetes 2020     Pulmonary emboli (H) 2011    Post op     Pulmonary embolism and infarction (H) 10/05/2011     Problem list name updated by automated process. 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    [facility] - Dr Telles/Paco - left sided     Social History     Socioeconomic History     Marital status:      Spouse name: Anand     Number of children: 1     Years of education: 12     Highest education level: Not on file   Occupational History     Not on file   Social Needs     Financial resource strain: Not on file     Food insecurity     Worry: Not on file     Inability: Not on file     Transportation needs     Medical: Not on file     Non-medical: Not on file   Tobacco Use     Smoking status: Never Smoker     Smokeless tobacco: Never Used     Tobacco comment: 30 years ago   Substance and Sexual Activity     Alcohol use: Yes     Alcohol/week: 2.0 - 4.0 standard drinks     Types: 2 - 4 Standard drinks or equivalent per week     Comment: 1-2 drinks about 2 times/week     Drug use: Never     Sexual activity: Not Currently     Partners: Male     Birth control/protection: None   Lifestyle     Physical activity     Days per week: Not on file     Minutes per  "session: Not on file     Stress: Not on file   Relationships     Social connections     Talks on phone: Not on file     Gets together: Not on file     Attends Restoration service: Not on file     Active member of club or organization: Not on file     Attends meetings of clubs or organizations: Not on file     Relationship status: Not on file     Intimate partner violence     Fear of current or ex partner: Not on file     Emotionally abused: Not on file     Physically abused: Not on file     Forced sexual activity: Not on file   Other Topics Concern     Parent/sibling w/ CABG, MI or angioplasty before 65F 55M? No      Service Not Asked     Blood Transfusions Not Asked     Caffeine Concern Yes     Comment: 2-4 cans per week     Occupational Exposure Not Asked     Hobby Hazards Not Asked     Sleep Concern Not Asked     Stress Concern Not Asked     Weight Concern Not Asked     Special Diet Not Asked     Back Care Not Asked     Exercise Yes     Comment: 6,000 to 9,000 steps daily     Bike Helmet Not Asked     Seat Belt Yes     Self-Exams Not Asked   Social History Narrative     Not on file         Patient's past medical, surgical, social, and family histories were reviewed today and no changes are noted.    REVIEW OF SYSTEMS:  10 point ROS is negative other than symptoms noted above in HPI, Past Medical History or as stated below  Constitutional: NEGATIVE for fever, chills, change in weight  Skin: NEGATIVE for worrisome rashes, moles or lesions  GI/: NEGATIVE for bowel or bladder changes  Neuro: NEGATIVE for weakness, dizziness or paresthesias    OBJECTIVE:  /76 (BP Location: Right arm, Patient Position: Chair, Cuff Size: Adult Regular)   Ht 1.499 m (4' 11\")   Wt 72.6 kg (160 lb)   LMP 06/03/2004   BMI 32.32 kg/m     General: healthy, alert and in no distress  HEENT: no scleral icterus or conjunctival erythema  Skin: no suspicious lesions or rash. No jaundice.  CV: no pedal edema  Resp: normal " respiratory effort without conversational dyspnea   Psych: normal mood and affect  Gait: normal steady gait with appropriate coordination and balance  Neuro: normal light touch sensory exam of the bilateral lower extremities.  DTR's 2+ patella and achilles bilaterally.  MSK:  THORACIC/LUMBAR SPINE  Inspection:    No gross deformity/asymmetry  Palpation:    Tender about the right para lumbar muscles. Otherwise remainder of landmarks are nontender.  Range of Motion:     Lumbar flexion limited slightly by pain    Lumbar extension limited slightly by pain  Strength:    Full strength throughout hips/quads/hamstrings  Special Tests:    Positive: none    Negative: straight leg raise (bilateral), slump test (bilateral)      Independent visualization of the below image:  LUMBAR SPINE TWO TO THREE VIEWS  1/20/2021 10:45 AM      COMPARISON: None.     HISTORY: Chronic bilateral low back pain without sciatica.                                                                      IMPRESSION: Five lumbar type vertebrae. Normal alignment. Vertebral  body heights are normal. No evidence for fracture. Degenerative  endplate spurring at the T11-T12, T12-L1 and L1-L2 levels. Facet  arthropathy at L5-S1.     JEFFREY BRACE, MD Albert Yeo MD [facility]        Again, thank you for allowing me to participate in the care of your patient.        Sincerely,        Albert Yeo, MD

## 2021-01-29 NOTE — PROGRESS NOTES
ASSESSMENT & PLAN  Patient Instructions     1. Lumbar degenerative disc disease    2. Chronic bilateral low back pain without sciatica      -Patient has chronic low back pain due to aggravation of multilevel lumbar degenerative disc disease and arthritis  -Patient was start formal physical therapy and home exercise program  -Patient may continue with Advil as needed.  Patient will start Skelaxin 800 mg 1-2 times a day as needed for muscle spasms.  -Patient will follow up with pain does not improve for reevaluation and further treatment.  To consider an MRI of the lumbar spine if symptoms do not improve  -Call direct clinic number [[phone]] at any time with questions or concerns.    Albert Yeo MD [facility]  [facility]          -----    SUBJECTIVE  Jael Car is a/an 70 year old female who is seen in consultation at the request of  Ken Gardiner M.D. for evaluation of low back pain. The patient is seen by themselves.    Onset: October 2020, pain has remained about the same since November 2020. Reports insidious onset without acute precipitating event.  Patient states ~15 years ago she injured her back due to a work related injury. Did Physical Therapy- at [facility].   Location of Pain: bilateral low back pain, spamsing and muscle tightness, sharp pains  Rating of Pain at worst: 9/10  Rating of Pain Currently: 6/10  Worsened by: bending in the morning, getting into bed at the end of the day, and worsening pain throughout the day.   Better with: activity avoidance   Treatments tried: massage manual and theragun, Advil, rest/ activity avoidance, previous use of ice.   Quality: sharp  Red flags: Weakness: No, bowel/bladder loss: No, foot drop: No  Associated symptoms: no distal numbness or tingling; denies swelling or warmth  Orthopedic history: UNKNOWN  Relevant surgical history: NO  Social history: social history: retired    Past Medical History:   Diagnosis  Date     Colon polyps 12/2017    tubular adenoma x 1 - 3mm - due 5 yrs     DVT (deep venous thrombosis) (H) 2011    PE - postop     Elevated serum protein level     Protein S     History of thrombophlebitis      Hyperlipidemia LDL goal <100 2006     Microscopic hematuria 2019    work up negative - Dr Redmond     MTHFR mutation (H)      Mumps      Osteoarthrosis of knee 2014    Dr Augustine     Osteopenia      Prediabetes 2020     Pulmonary emboli (H) 2011    Post op     Pulmonary embolism and infarction (H) 10/05/2011     Problem list name updated by automated process. 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    [facility] - Dr Telles/Paco - left sided     Social History     Socioeconomic History     Marital status:      Spouse name: Anand     Number of children: 1     Years of education: 12     Highest education level: Not on file   Occupational History     Not on file   Social Needs     Financial resource strain: Not on file     Food insecurity     Worry: Not on file     Inability: Not on file     Transportation needs     Medical: Not on file     Non-medical: Not on file   Tobacco Use     Smoking status: Never Smoker     Smokeless tobacco: Never Used     Tobacco comment: 30 years ago   Substance and Sexual Activity     Alcohol use: Yes     Alcohol/week: 2.0 - 4.0 standard drinks     Types: 2 - 4 Standard drinks or equivalent per week     Comment: 1-2 drinks about 2 times/week     Drug use: Never     Sexual activity: Not Currently     Partners: Male     Birth control/protection: None   Lifestyle     Physical activity     Days per week: Not on file     Minutes per session: Not on file     Stress: Not on file   Relationships     Social connections     Talks on phone: Not on file     Gets together: Not on file     Attends Jew service: Not on file     Active member of club or organization: Not on file     Attends meetings of clubs or  "organizations: Not on file     Relationship status: Not on file     Intimate partner violence     Fear of current or ex partner: Not on file     Emotionally abused: Not on file     Physically abused: Not on file     Forced sexual activity: Not on file   Other Topics Concern     Parent/sibling w/ CABG, MI or angioplasty before 65F 55M? No      Service Not Asked     Blood Transfusions Not Asked     Caffeine Concern Yes     Comment: 2-4 cans per week     Occupational Exposure Not Asked     Hobby Hazards Not Asked     Sleep Concern Not Asked     Stress Concern Not Asked     Weight Concern Not Asked     Special Diet Not Asked     Back Care Not Asked     Exercise Yes     Comment: 6,000 to 9,000 steps daily     Bike Helmet Not Asked     Seat Belt Yes     Self-Exams Not Asked   Social History Narrative     Not on file         Patient's past medical, surgical, social, and family histories were reviewed today and no changes are noted.    REVIEW OF SYSTEMS:  10 point ROS is negative other than symptoms noted above in HPI, Past Medical History or as stated below  Constitutional: NEGATIVE for fever, chills, change in weight  Skin: NEGATIVE for worrisome rashes, moles or lesions  GI/: NEGATIVE for bowel or bladder changes  Neuro: NEGATIVE for weakness, dizziness or paresthesias    OBJECTIVE:  /76 (BP Location: Right arm, Patient Position: Chair, Cuff Size: Adult Regular)   Ht 1.499 m (4' 11\")   Wt 72.6 kg (160 lb)   LMP 06/03/2004   BMI 32.32 kg/m     General: healthy, alert and in no distress  HEENT: no scleral icterus or conjunctival erythema  Skin: no suspicious lesions or rash. No jaundice.  CV: no pedal edema  Resp: normal respiratory effort without conversational dyspnea   Psych: normal mood and affect  Gait: normal steady gait with appropriate coordination and balance  Neuro: normal light touch sensory exam of the bilateral lower extremities.  DTR's 2+ patella and achilles " bilaterally.  MSK:  THORACIC/LUMBAR SPINE  Inspection:    No gross deformity/asymmetry  Palpation:    Tender about the right para lumbar muscles. Otherwise remainder of landmarks are nontender.  Range of Motion:     Lumbar flexion limited slightly by pain    Lumbar extension limited slightly by pain  Strength:    Full strength throughout hips/quads/hamstrings  Special Tests:    Positive: none    Negative: straight leg raise (bilateral), slump test (bilateral)      Independent visualization of the below image:  LUMBAR SPINE TWO TO THREE VIEWS  1/20/2021 10:45 AM      COMPARISON: None.     HISTORY: Chronic bilateral low back pain without sciatica.                                                                      IMPRESSION: Five lumbar type vertebrae. Normal alignment. Vertebral  body heights are normal. No evidence for fracture. Degenerative  endplate spurring at the T11-T12, T12-L1 and L1-L2 levels. Facet  arthropathy at L5-S1.     JEFFREY BRACE, MD Albert Yeo MD [facility]

## 2021-01-29 NOTE — PATIENT INSTRUCTIONS
1. Lumbar degenerative disc disease    2. Chronic bilateral low back pain without sciatica      -Patient has chronic low back pain due to aggravation of multilevel lumbar degenerative disc disease and arthritis  -Patient was start formal physical therapy and home exercise program  -Patient may continue with Advil as needed.  Patient will start Skelaxin 800 mg 1-2 times a day as needed for muscle spasms.  -Patient will follow up with pain does not improve for reevaluation and further treatment.  To consider an MRI of the lumbar spine if symptoms do not improve  -Call direct clinic number [[phone]] at any time with questions or concerns.    Albert Yeo MD CAQSM  [facility]  [facility] [place]

## 2021-02-01 ENCOUNTER — MYC MEDICAL ADVICE (OUTPATIENT)
Dept: ORTHOPEDICS | Facility: CLINIC | Age: 71
End: 2021-02-01

## 2021-02-03 ENCOUNTER — THERAPY VISIT (OUTPATIENT)
Dept: PHYSICAL THERAPY | Facility: CLINIC | Age: 71
End: 2021-02-03
Payer: COMMERCIAL

## 2021-02-03 DIAGNOSIS — M54.50 CHRONIC BILATERAL LOW BACK PAIN WITHOUT SCIATICA: ICD-10-CM

## 2021-02-03 DIAGNOSIS — M51.369 LUMBAR DEGENERATIVE DISC DISEASE: ICD-10-CM

## 2021-02-03 DIAGNOSIS — G89.29 CHRONIC BILATERAL LOW BACK PAIN WITHOUT SCIATICA: ICD-10-CM

## 2021-02-03 PROCEDURE — 97161 PT EVAL LOW COMPLEX 20 MIN: CPT | Mod: GP | Performed by: PHYSICAL THERAPIST

## 2021-02-03 PROCEDURE — 97110 THERAPEUTIC EXERCISES: CPT | Mod: GP | Performed by: PHYSICAL THERAPIST

## 2021-02-03 NOTE — PROGRESS NOTES
"[facility] Initial Evaluation  Subjective:  Mid October 2020 patient had insidious onset of right LBP.  At this time pain is constant right LB 1-9/10, described as \"sharp\" with intermittent pain across the LB.  Pain gets progressively worse as the day goes on.  She sleeps prone and does a few lumbar extension stretches before getting up from bed.  Symptoms increase with getting up from sitting, standing (as day goes on), bending forward, getting into bed and getting comfortable to sleep.  Symptoms decrease with nothing.  Patient is semi-retired and spends time during the day on her computer.      The history is provided by the patient. No  was used.   Patient Health History           General health as reported by patient is good.  Pertinent medical history includes: overweight.   Red flags:  None as reported by patient.  Medical allergies: none.   Surgeries include:  Orthopedic surgery. Other surgery history details: R RCR 2011.    Current medications:  Muscle relaxants (ibuprofen prn).    Current occupation is Accounting - during tax season.   Primary job tasks include:  Computer work.                  Therapist Generated HPI Evaluation                     Pain is worse in the P.M..  Since onset symptoms are unchanged.     Special tests included:  X-ray.    Restrictions due to condition include:  Working in normal job without restrictions.  Barriers include:  None as reported by patient.                        Objective:  Standing Alignment:    Cervical/thoracic deviations alignment: low right thoracic convexity and rib hump.    Lumbar:  Lordosis decr            Gait:    Gait Type:  Normal   Assistive Devices:  None                 Lumbar/SI Evaluation  ROM:    AROM Lumbar:   Flexion:          WNL - guarded  Ext:                    WNL   Side Bend:        Left:     Right:   Rotation:           Left:     Right:   Side Glide:        Left:  WNL    Right:  WNL      "           Lumbar Dermtomes:  normal                Neural Tension/Mobility:      Left side:Slump  negative.     Right side:   Slump  negative.                                                        General     ROS  Symptoms prior to test movements:  1/10 right LB   Correction of sitting posture with lumbar roll:  Abolish pain (needed small step under feet)  Prone:  Initially 3/10 right LB, then to 1/10  REIL:  0/10    Spring testing lumbar spine:  No tenderness    Assessment/Plan:    Patient is a 70 year old female with lumbar complaints.    Patient has the following significant findings with corresponding treatment plan.                Diagnosis 1:  LBP    Pain -  self management, education, directional preference exercise and home program  Decreased function - therapeutic activities and home program  Impaired posture - neuro re-education and home program    Cumulative Therapy Evaluation is: Low complexity.    Previous and current functional limitations:  (See Goal Flow Sheet for this information)    Short term and Long term goals: (See Goal Flow Sheet for this information)     Communication ability:  Patient appears to be able to clearly communicate and understand verbal and written communication and follow directions correctly.  Treatment Explanation - The following has been discussed with the patient:   RX ordered/plan of care  Anticipated outcomes  Possible risks and side effects  This patient would benefit from PT intervention to resume normal activities.   Rehab potential is excellent.    Frequency:  1 X week, once daily  Duration:  for 4-6 weeks  Discharge Plan:  Achieve all LTG.  Independent in home treatment program.  Reach maximal therapeutic benefit.    Please refer to the daily flowsheet for treatment today, total treatment time and time spent performing 1:1 timed codes.

## 2021-02-09 ENCOUNTER — THERAPY VISIT (OUTPATIENT)
Dept: PHYSICAL THERAPY | Facility: CLINIC | Age: 71
End: 2021-02-09
Payer: COMMERCIAL

## 2021-02-09 DIAGNOSIS — M54.50 LUMBAGO: Primary | ICD-10-CM

## 2021-02-09 PROCEDURE — 97140 MANUAL THERAPY 1/> REGIONS: CPT | Mod: GP | Performed by: PHYSICAL THERAPIST

## 2021-02-09 PROCEDURE — 97110 THERAPEUTIC EXERCISES: CPT | Mod: GP | Performed by: PHYSICAL THERAPIST

## 2021-02-11 NOTE — TELEPHONE ENCOUNTER
Message from Pharmacy, calling to follow up on a PA for a medication for Hannah. Please call back to update on the status.

## 2021-02-12 NOTE — TELEPHONE ENCOUNTER
Called the pharmacy and informed them they can cancel the PA. The patient no longer wants this rx, decided to go with Flexeril instead.    Jacquelin Currie ATC

## 2021-02-16 ENCOUNTER — THERAPY VISIT (OUTPATIENT)
Dept: PHYSICAL THERAPY | Facility: CLINIC | Age: 71
End: 2021-02-16
Payer: COMMERCIAL

## 2021-02-16 DIAGNOSIS — M54.50 LUMBAGO: ICD-10-CM

## 2021-02-16 PROCEDURE — 97110 THERAPEUTIC EXERCISES: CPT | Mod: GP | Performed by: PHYSICAL THERAPIST

## 2021-02-16 PROCEDURE — 97140 MANUAL THERAPY 1/> REGIONS: CPT | Mod: GP | Performed by: PHYSICAL THERAPIST

## 2021-02-23 ENCOUNTER — THERAPY VISIT (OUTPATIENT)
Dept: PHYSICAL THERAPY | Facility: CLINIC | Age: 71
End: 2021-02-23
Payer: COMMERCIAL

## 2021-02-23 DIAGNOSIS — M54.50 LUMBAGO: ICD-10-CM

## 2021-02-23 PROCEDURE — 97110 THERAPEUTIC EXERCISES: CPT | Mod: GP | Performed by: PHYSICAL THERAPIST

## 2021-03-02 ENCOUNTER — THERAPY VISIT (OUTPATIENT)
Dept: PHYSICAL THERAPY | Facility: CLINIC | Age: 71
End: 2021-03-02
Payer: COMMERCIAL

## 2021-03-02 DIAGNOSIS — M54.50 LUMBAGO: ICD-10-CM

## 2021-03-02 PROCEDURE — 97110 THERAPEUTIC EXERCISES: CPT | Mod: GP | Performed by: PHYSICAL THERAPIST

## 2021-03-16 ENCOUNTER — THERAPY VISIT (OUTPATIENT)
Dept: PHYSICAL THERAPY | Facility: CLINIC | Age: 71
End: 2021-03-16
Payer: COMMERCIAL

## 2021-03-16 DIAGNOSIS — M54.50 LUMBAGO: ICD-10-CM

## 2021-03-16 PROCEDURE — 97110 THERAPEUTIC EXERCISES: CPT | Mod: GP | Performed by: PHYSICAL THERAPIST

## 2021-03-16 PROCEDURE — 97530 THERAPEUTIC ACTIVITIES: CPT | Mod: GP | Performed by: PHYSICAL THERAPIST

## 2021-03-16 NOTE — PROGRESS NOTES
"Subjective:  HPI  Physical Exam                    Objective:  System    Physical Exam    General     ROS    Assessment/Plan:    PROGRESS  REPORT     Progress reporting period is from 2-3-21 to 3-16-21, 6 visits.       SUBJECTIVE  Patient initially had c/o constant right LBP 1-9/10, described as \"sharp\" with intermittent pain across the LB.  Pain would get progressively worse as the day went on, with getting up from sitting, standing (as day goes on), bending forward, getting into bed and getting comfortable to sleep.    Today patient reports she has not taken ibuprofen and  had had one episode of discomfort during the day in the last 12 days.  She gets minor symptoms it gets into bed too quickly each night, better than previous, and had one episode of discomfort 5 days ago getting into the passenger side of a car.  Has been getting ~1000 more steps per day average this week without discomfort.  No longer pain with sit-stand transfers.  Current pain level is 1/10 right LB   Initial Pain level: (1-9/10).   Changes in function:  Yes (See Goal flowsheet attached for changes in current functional level)  Adverse reaction to treatment or activity: None    OBJECTIVE   Lumbar extension and flexion WNL, left sideglide min restricted with pain right LB, right sideglide WNL. Pain/difficulty getting on/off plinth lying on the right side.      ASSESSMENT/PLAN  Updated problem list and treatment plan: Diagnosis 1:  LBP    Pain -  self management and home program  STG/LTGs have been met or progress has been made towards goals:  Yes (See Goal flow sheet completed today.)  Assessment of Progress: The patient's condition is improving.  Self Management Plans:  Patient is independent in a home treatment program.      Recommendations:  Patient to continue HEP until symptom-free, including with bed transfers for 1 week and then go to maintenance program as instructed.  Patient to call and return for 1-2 additional visits if not " progressing to symptom-free, otherwise will discharge from PT.    Please refer to the daily flowsheet for treatment today, total treatment time and time spent performing 1:1 timed codes.    ADDENDUM 5-13-21:  Patient did not return.  Will discharge PT chart at this time.   Candelaria Weinberg PT

## 2021-04-22 ENCOUNTER — MYC MEDICAL ADVICE (OUTPATIENT)
Dept: ENDOCRINOLOGY | Facility: CLINIC | Age: 71
End: 2021-04-22

## 2021-04-23 ENCOUNTER — VIRTUAL VISIT (OUTPATIENT)
Dept: ENDOCRINOLOGY | Facility: CLINIC | Age: 71
End: 2021-04-23
Payer: COMMERCIAL

## 2021-04-23 DIAGNOSIS — E05.90 SUBCLINICAL HYPERTHYROIDISM: ICD-10-CM

## 2021-04-23 DIAGNOSIS — R79.89 LOW TSH LEVEL: Primary | ICD-10-CM

## 2021-04-23 PROCEDURE — 99204 OFFICE O/P NEW MOD 45 MIN: CPT | Mod: 95 | Performed by: INTERNAL MEDICINE

## 2021-04-23 NOTE — PROGRESS NOTES
Hannah is a 70 year old who is being evaluated via a billable video visit.      How would you like to obtain your AVS? Verbally Reviewed  If the video visit is dropped, the invitation should be resent by: Cellphone  Will anyone else be joining your video visit? No      Video Start Time: 8:40 am    Video-Visit Details    Type of service:  Video Visit    Video End Time: 9:05 am    Originating Location (pt. Location): home    Distant Location (provider location):  [facility]     Platform used for Video Visit:  Zapoint      Name: Jael Car is a 70 year old woman, seen at the request of Dr. Ken Gardiner for evaluation of     Chief Complaint   Patient presents with     Endocrine Problem       HPI:  Recent issues:  Here for evaluation of thyroid problem  Reviewed medical history from patient and Epic chart record        History of low TSH with subclinical hyperthyroidism  5/2019 labs showed low TSH 0.23 and normal FT4 1.04.  Had seen Dr. Jose Muñoz, then Dr. Ken Gardiner    1/16/21. Thyroid nuclear scan:   Thyroid gland appears relatively normal in size   Homogeneous thyroid uptake. 24-hour uptake was calculated at 28.6% (normal 10-30%)    Additional health history:  Previous thyroid nodules:  none  Neck radiation treatments: none  Thyroid medication use: none  Fam Hx thyroid disease: none    Previous [facility] thyroid tests include:     Lab Test 12/16/20  0917 10/21/20  0844 01/24/20  1535 12/18/19  1019 05/08/19  1040 12/19/14  1637 12/19/14  1637   TSH 0.29* 0.67 0.27* 0.30* 0.23*   < > 0.46   T4 1.26 1.30 1.12 1.20 1.04   < > 1.07   FT3  --   --   --   --   --   --  3.1    < > = values in this interval not displayed.       Recent [facility] labs include:  Lab Results   Component Value Date    TSH 0.29 (L) 12/16/2020    T4 1.26 12/16/2020    FT3 3.1 12/19/2014         Lives in [place], [place]  Sees Dr. Ken Gardiner/[facility] for general medicine evaluations.    PMH/PSH:  Past  Medical History:   Diagnosis Date     Colon polyps 12/2017    tubular adenoma x 1 - 3mm - due 5 yrs     DVT (deep venous thrombosis) (H) 2011    PE - postop     Elevated serum protein level     Protein S     History of thrombophlebitis      Hyperlipidemia LDL goal <100 2006     Microscopic hematuria 2019    work up negative - Dr Redmond     MTHFR mutation (H)      Mumps      Osteoarthrosis of knee 2014    Dr Augustine     Osteopenia      Prediabetes 2020     Pulmonary emboli (H) 2011    Post op     Pulmonary embolism and infarction (H) 10/05/2011     Problem list name updated by automated process. 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    [facility] - Dr Telles/Paco - left sided     Past Surgical History:   Procedure Laterality Date     COLONOSCOPY  09, 9/15, 12/17    colitis, tubular adenoma x 1 - 3mm - diverticulosis - due 5 yrs     FLEXIBLE SIGMOIDOSCOPY  12/2018    benign, quiscient UC     SIGMOIDOSCOPY FLEXIBLE  09/2017    rectosigmoiditis     SURGICAL HISTORY OF -  Right 2011    Rt Rotator Cuff       Family Hx:  Family History   Problem Relation Age of Onset     Alcohol/Drug Father      Lipids Father         high cholesterol     Diabetes Paternal Grandmother      Diabetes Son         diet controlled     Lung Cancer Maternal Grandfather         smoker     Coronary Artery Disease Brother          Social Hx:  Social History     Socioeconomic History     Marital status:      Spouse name: Anand     Number of children: 1     Years of education: 12     Highest education level: Not on file   Occupational History     Not on file   Social Needs     Financial resource strain: Not on file     Food insecurity     Worry: Not on file     Inability: Not on file     Transportation needs     Medical: Not on file     Non-medical: Not on file   Tobacco Use     Smoking status: Never Smoker     Smokeless tobacco: Never Used     Tobacco comment: 30  years ago   Substance and Sexual Activity     Alcohol use: Yes     Alcohol/week: 2.0 - 4.0 standard drinks     Types: 2 - 4 Standard drinks or equivalent per week     Comment: 1-2 drinks about 2 times/week     Drug use: Never     Sexual activity: Not Currently     Partners: Male     Birth control/protection: None   Lifestyle     Physical activity     Days per week: Not on file     Minutes per session: Not on file     Stress: Not on file   Relationships     Social connections     Talks on phone: Not on file     Gets together: Not on file     Attends Anabaptist service: Not on file     Active member of club or organization: Not on file     Attends meetings of clubs or organizations: Not on file     Relationship status: Not on file     Intimate partner violence     Fear of current or ex partner: Not on file     Emotionally abused: Not on file     Physically abused: Not on file     Forced sexual activity: Not on file   Other Topics Concern     Parent/sibling w/ CABG, MI or angioplasty before 65F 55M? No      Service Not Asked     Blood Transfusions Not Asked     Caffeine Concern Yes     Comment: 2-4 cans per week     Occupational Exposure Not Asked     Hobby Hazards Not Asked     Sleep Concern Not Asked     Stress Concern Not Asked     Weight Concern Not Asked     Special Diet Not Asked     Back Care Not Asked     Exercise Yes     Comment: 6,000 to 9,000 steps daily     Bike Helmet Not Asked     Seat Belt Yes     Self-Exams Not Asked   Social History Narrative     Not on file          MEDICATIONS:  has a current medication list which includes the following prescription(s): aspirin, atorvastatin, balsalazide, cholecalciferol, and childrens multivitamin w/iron.    ROS:     ROS: 10 point ROS neg other than the symptoms noted above in the HPI.    GENERAL: energy good, wt stable; denies fevers, chills, night sweats.   HEENT: no dysphagia, odonophagia, diplopia, neck pain  THYROID:  no apparent hyper or hypothyroid  symptoms  CV: no chest pain, pressure, palpitations  LUNGS: no SOB, COOK, cough, wheezing   ABDOMEN:  Some soft stools but no diarrhea, constipation, abdominal pain  EXTREMITIES: occasional right ankle swelling; no tremors, rashes, ulcers  NEUROLOGY: no headaches, denies changes in vision, tingling, extremitiy numbness   MSK: some back pains; denies muscle weakness  SKIN: no rashes or lesions  : no menses  PSYCH:  stable mood, no significant anxiety or depression  ENDOCRINE: no heat or cold intolerance    Physical Exam (visual exam)  VS:  no vital signs taken for video visit  CONSTITUTIONAL: healthy, alert and NAD, well dressed, answering questions appropriately  ENT: no nose swelling or nasal discharge, mouth redness or gum changes.  EYES: eyes grossly normal to inspection, conjunctivae and sclerae normal, no exophthalmos or proptosis  THYROID:  no apparent nodules or goiter  LUNGS: no audible wheeze, cough or visible cyanosis, no visible retractions or increased work of breathing  ABDOMEN: abdomen not evaluated  EXTREMITIES: no hand tremors, limited exam  NEUROLOGY: CN grossly intact, mentation intact and speech normal   SKIN:  no apparent skin lesions, rash, or edema with visualized skin appearance  PSYCH: mentation appears normal, affect normal/bright, judgement and insight intact,   normal speech and appearance well groomed      LABS:    All pertinent notes, labs, and images personally reviewed by me.     A/P:  Encounter Diagnoses   Name Primary?     Low TSH level Yes     Subclinical hyperthyroidism        Comments:  Reviewed health history and the hyperthyroidism issues  No hyperthyroid symptoms noted.  Reviewed and interpreted tests that I previously ordered.   Ordered appropriate tests for the endocrinology disease management.    Management options discussed and implemented after shared medical decision making with the patient.    Plan:  Discussed general issues with the hyperthyroidism diagnosis and  management  Reviewed thyroid gland anatomy and hormone physiology  Discussed lab tests used to assess patient thyroid hormone levels  Reviewed imaging procedure options with the 24-hr radioiodine uptake/scan, also thyroid ultrasound  We discussed treatment options for hyperthyroidism    Recommend:  Need additional thyroid lab tests for evaluation  Plan lab appointment soon   Check thyroid and pituitary-related tests   Discussed the  PL clinic option   Lab orders placed  Monitor for symptom changes  Consider future thyroid imaging with thyroid U/S  Would not start antithyroid medication treatment at this time  Will summarize results and recommendations when results available    Addressed patient questions today    Future labs ordered today:   Orders Placed This Encounter   Procedures     TSH     T4 free     T3 Free     Thyroid stimulating immunoglobulin     Prolactin     Follicle stimulating hormone     Cortisol     Radiology/Consults ordered today: None    Total time spent in with the patient evaluation:  25 min  Additional time spent reviewing pertinent lab tests and chart notes, and documentation:  5 min    Follow-up:  NUSRAT Everett MD, MS  Endocrinology  [facility]    CC: Ken Gardiner

## 2021-04-23 NOTE — LETTER
4/23/2021         RE: Jael Car  [address]  [place] [address]        Dear Colleague,    Thank you for referring your patient, Jael Car, to the [facility]. Please see a copy of my visit note below.    Hannah is a 70 year old who is being evaluated via a billable video visit.      How would you like to obtain your AVS? Verbally Reviewed  If the video visit is dropped, the invitation should be resent by: Cellphone  Will anyone else be joining your video visit? No      Video Start Time: 8:40 am    Video-Visit Details    Type of service:  Video Visit    Video End Time: 9:05 am    Originating Location (pt. Location): home    Distant Location (provider location):  [facility]     Platform used for Video Visit:  Legend3D      Name: Jael Car is a 70 year old woman, seen at the request of Dr. Ken Gardiner for evaluation of     Chief Complaint   Patient presents with     Endocrine Problem       HPI:  Recent issues:  Here for evaluation of thyroid problem  Reviewed medical history from patient and Epic chart record        History of low TSH with subclinical hyperthyroidism  5/2019 labs showed low TSH 0.23 and normal FT4 1.04.  Had seen Dr. Jose Muñoz, then Dr. Ken Gardnier    1/16/21. Thyroid nuclear scan:   Thyroid gland appears relatively normal in size   Homogeneous thyroid uptake. 24-hour uptake was calculated at 28.6% (normal 10-30%)    Additional health history:  Previous thyroid nodules:  none  Neck radiation treatments: none  Thyroid medication use: none  Fam Hx thyroid disease: none    Previous [facility] thyroid tests include:     Lab Test 12/16/20  0917 10/21/20  0844 01/24/20  1535 12/18/19  1019 05/08/19  1040 12/19/14  1637 12/19/14  1637   TSH 0.29* 0.67 0.27* 0.30* 0.23*   < > 0.46   T4 1.26 1.30 1.12 1.20 1.04   < > 1.07   FT3  --   --   --   --   --   --  3.1    < > = values in this interval not displayed.       Recent [facility] labs  include:  Lab Results   Component Value Date    TSH 0.29 (L) 12/16/2020    T4 1.26 12/16/2020    FT3 3.1 12/19/2014         Lives in [place], [place]  Sees Dr. Ken Gardiner/[facility] for general medicine evaluations.    PMH/PSH:  Past Medical History:   Diagnosis Date     Colon polyps 12/2017    tubular adenoma x 1 - 3mm - due 5 yrs     DVT (deep venous thrombosis) (H) 2011    PE - postop     Elevated serum protein level     Protein S     History of thrombophlebitis      Hyperlipidemia LDL goal <100 2006     Microscopic hematuria 2019    work up negative - Dr Redmond     MTHFR mutation (H)      Mumps      Osteoarthrosis of knee 2014    Dr Augustine     Osteopenia      Prediabetes 2020     Pulmonary emboli (H) 2011    Post op     Pulmonary embolism and infarction (H) 10/05/2011     Problem list name updated by automated process. 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    [facility] - Dr Telles/[facility] - left sided     Past Surgical History:   Procedure Laterality Date     COLONOSCOPY  09, 9/15, 12/17    colitis, tubular adenoma x 1 - 3mm - diverticulosis - due 5 yrs     FLEXIBLE SIGMOIDOSCOPY  12/2018    benign, quiscient UC     SIGMOIDOSCOPY FLEXIBLE  09/2017    rectosigmoiditis     SURGICAL HISTORY OF -  Right 2011    Rt Rotator Cuff       Family Hx:  Family History   Problem Relation Age of Onset     Alcohol/Drug Father      Lipids Father         high cholesterol     Diabetes Paternal Grandmother      Diabetes Son         diet controlled     Lung Cancer Maternal Grandfather         smoker     Coronary Artery Disease Brother          Social Hx:  Social History     Socioeconomic History     Marital status:      Spouse name: Anand     Number of children: 1     Years of education: 12     Highest education level: Not on file   Occupational History     Not on file   Social Needs     Financial resource strain: Not on file      Food insecurity     Worry: Not on file     Inability: Not on file     Transportation needs     Medical: Not on file     Non-medical: Not on file   Tobacco Use     Smoking status: Never Smoker     Smokeless tobacco: Never Used     Tobacco comment: 30 years ago   Substance and Sexual Activity     Alcohol use: Yes     Alcohol/week: 2.0 - 4.0 standard drinks     Types: 2 - 4 Standard drinks or equivalent per week     Comment: 1-2 drinks about 2 times/week     Drug use: Never     Sexual activity: Not Currently     Partners: Male     Birth control/protection: None   Lifestyle     Physical activity     Days per week: Not on file     Minutes per session: Not on file     Stress: Not on file   Relationships     Social connections     Talks on phone: Not on file     Gets together: Not on file     Attends Faith service: Not on file     Active member of club or organization: Not on file     Attends meetings of clubs or organizations: Not on file     Relationship status: Not on file     Intimate partner violence     Fear of current or ex partner: Not on file     Emotionally abused: Not on file     Physically abused: Not on file     Forced sexual activity: Not on file   Other Topics Concern     Parent/sibling w/ CABG, MI or angioplasty before 65F 55M? No      Service Not Asked     Blood Transfusions Not Asked     Caffeine Concern Yes     Comment: 2-4 cans per week     Occupational Exposure Not Asked     Hobby Hazards Not Asked     Sleep Concern Not Asked     Stress Concern Not Asked     Weight Concern Not Asked     Special Diet Not Asked     Back Care Not Asked     Exercise Yes     Comment: 6,000 to 9,000 steps daily     Bike Helmet Not Asked     Seat Belt Yes     Self-Exams Not Asked   Social History Narrative     Not on file          MEDICATIONS:  has a current medication list which includes the following prescription(s): aspirin, atorvastatin, balsalazide, cholecalciferol, and childrens multivitamin w/iron.    ROS:      ROS: 10 point ROS neg other than the symptoms noted above in the HPI.    GENERAL: energy good, wt stable; denies fevers, chills, night sweats.   HEENT: no dysphagia, odonophagia, diplopia, neck pain  THYROID:  no apparent hyper or hypothyroid symptoms  CV: no chest pain, pressure, palpitations  LUNGS: no SOB, COOK, cough, wheezing   ABDOMEN:  Some soft stools but no diarrhea, constipation, abdominal pain  EXTREMITIES: occasional right ankle swelling; no tremors, rashes, ulcers  NEUROLOGY: no headaches, denies changes in vision, tingling, extremitiy numbness   MSK: some back pains; denies muscle weakness  SKIN: no rashes or lesions  : no menses  PSYCH:  stable mood, no significant anxiety or depression  ENDOCRINE: no heat or cold intolerance    Physical Exam (visual exam)  VS:  no vital signs taken for video visit  CONSTITUTIONAL: healthy, alert and NAD, well dressed, answering questions appropriately  ENT: no nose swelling or nasal discharge, mouth redness or gum changes.  EYES: eyes grossly normal to inspection, conjunctivae and sclerae normal, no exophthalmos or proptosis  THYROID:  no apparent nodules or goiter  LUNGS: no audible wheeze, cough or visible cyanosis, no visible retractions or increased work of breathing  ABDOMEN: abdomen not evaluated  EXTREMITIES: no hand tremors, limited exam  NEUROLOGY: CN grossly intact, mentation intact and speech normal   SKIN:  no apparent skin lesions, rash, or edema with visualized skin appearance  PSYCH: mentation appears normal, affect normal/bright, judgement and insight intact,   normal speech and appearance well groomed      LABS:    All pertinent notes, labs, and images personally reviewed by me.     A/P:  Encounter Diagnoses   Name Primary?     Low TSH level Yes     Subclinical hyperthyroidism        Comments:  Reviewed health history and the hyperthyroidism issues  No hyperthyroid symptoms noted.  Reviewed and interpreted tests that I previously ordered.    Ordered appropriate tests for the endocrinology disease management.    Management options discussed and implemented after shared medical decision making with the patient.    Plan:  Discussed general issues with the hyperthyroidism diagnosis and management  Reviewed thyroid gland anatomy and hormone physiology  Discussed lab tests used to assess patient thyroid hormone levels  Reviewed imaging procedure options with the 24-hr radioiodine uptake/scan, also thyroid ultrasound  We discussed treatment options for hyperthyroidism    Recommend:  Need additional thyroid lab tests for evaluation  Plan lab appointment soon   Check thyroid and pituitary-related tests   Discussed the  PL clinic option   Lab orders placed  Monitor for symptom changes  Consider future thyroid imaging with thyroid U/S  Would not start antithyroid medication treatment at this time  Will summarize results and recommendations when results available    Addressed patient questions today    Future labs ordered today:   Orders Placed This Encounter   Procedures     TSH     T4 free     T3 Free     Thyroid stimulating immunoglobulin     Prolactin     Follicle stimulating hormone     Cortisol     Radiology/Consults ordered today: None    Total time spent in with the patient evaluation:  25 min  Additional time spent reviewing pertinent lab tests and chart notes, and documentation:  5 min    Follow-up:  NUSRAT Everett MD, MS  Endocrinology  [facility]    CC: Ken Gardiner           Again, thank you for allowing me to participate in the care of your patient.        Sincerely,        Rony Everett MD

## 2021-05-04 DIAGNOSIS — R79.89 LOW TSH LEVEL: ICD-10-CM

## 2021-05-04 DIAGNOSIS — E05.90 SUBCLINICAL HYPERTHYROIDISM: ICD-10-CM

## 2021-05-04 LAB
CORTIS SERPL-MCNC: 9.1 UG/DL (ref 4–22)
FSH SERPL-ACNC: 61.7 IU/L
PROLACTIN SERPL-MCNC: 5 UG/L (ref 3–27)
T3FREE SERPL-MCNC: 2.9 PG/ML (ref 2.3–4.2)
T4 FREE SERPL-MCNC: 1 NG/DL (ref 0.76–1.46)
TSH SERPL DL<=0.005 MIU/L-ACNC: 0.32 MU/L (ref 0.4–4)

## 2021-05-04 PROCEDURE — 36415 COLL VENOUS BLD VENIPUNCTURE: CPT | Performed by: INTERNAL MEDICINE

## 2021-05-04 PROCEDURE — 99000 SPECIMEN HANDLING OFFICE-LAB: CPT | Performed by: INTERNAL MEDICINE

## 2021-05-04 PROCEDURE — 84443 ASSAY THYROID STIM HORMONE: CPT | Performed by: INTERNAL MEDICINE

## 2021-05-04 PROCEDURE — 83001 ASSAY OF GONADOTROPIN (FSH): CPT | Performed by: INTERNAL MEDICINE

## 2021-05-04 PROCEDURE — 84481 FREE ASSAY (FT-3): CPT | Performed by: INTERNAL MEDICINE

## 2021-05-04 PROCEDURE — 84439 ASSAY OF FREE THYROXINE: CPT | Performed by: INTERNAL MEDICINE

## 2021-05-04 PROCEDURE — 82533 TOTAL CORTISOL: CPT | Performed by: INTERNAL MEDICINE

## 2021-05-04 PROCEDURE — 84445 ASSAY OF TSI GLOBULIN: CPT | Mod: 90 | Performed by: INTERNAL MEDICINE

## 2021-05-04 PROCEDURE — 84146 ASSAY OF PROLACTIN: CPT | Performed by: INTERNAL MEDICINE

## 2021-05-10 LAB — TSI SER-ACNC: <1 TSI INDEX

## 2021-05-11 DIAGNOSIS — R79.89 LOW TSH LEVEL: Primary | ICD-10-CM

## 2021-05-11 DIAGNOSIS — E05.90 SUBCLINICAL HYPERTHYROIDISM: ICD-10-CM

## 2021-07-02 ENCOUNTER — TELEPHONE (OUTPATIENT)
Dept: FAMILY MEDICINE | Facility: CLINIC | Age: 71
End: 2021-07-02

## 2021-07-02 DIAGNOSIS — G89.29 CHRONIC BILATERAL LOW BACK PAIN WITHOUT SCIATICA: ICD-10-CM

## 2021-07-02 DIAGNOSIS — M54.50 CHRONIC BILATERAL LOW BACK PAIN WITHOUT SCIATICA: ICD-10-CM

## 2021-07-02 DIAGNOSIS — M15.0 PRIMARY OSTEOARTHRITIS INVOLVING MULTIPLE JOINTS: Primary | ICD-10-CM

## 2021-07-02 NOTE — TELEPHONE ENCOUNTER
Patient tried to dens a YAZUO message on this issue a week ago and it did not come thru to us.    Pt is requesting a referral for PT for her low back pain again.  Please advise.

## 2021-07-05 ENCOUNTER — MYC MEDICAL ADVICE (OUTPATIENT)
Dept: FAMILY MEDICINE | Facility: CLINIC | Age: 71
End: 2021-07-05

## 2021-07-05 NOTE — TELEPHONE ENCOUNTER
To: RV TRIAGE      From: Hannah Car      Created: 7/5/2021 9:57 AM        *-*-*This message has not been handled.*-*-*    I would like a referral for PT for my lower back at [facility] at [phone].     I sent a referral via my chart on Friday June 25th, heard nothing.  So Friday July 2nd, I tried to call the [facility], but got someone in [place], they said they were going to send you a note for my referral, but they didn't ask who was my referral for.     Therefore I am trying to do this via ask a question.     Thanks     Hannah Car  [phone]        This is a my chart message from pt from 7/5/2021    Katerina Kennedy RN, BSN  [facility] Triage

## 2021-07-07 NOTE — TELEPHONE ENCOUNTER
Referral faxed to [facility] at [phone]    Patient notified via yesenia Reyes

## 2021-07-07 NOTE — TELEPHONE ENCOUNTER
Chart reviewed in absence of PCP, Dr. Gardiner, who is out of office this week. Appears she tried PT through [facility] in March already. Ok to give new referral per her request as copied below. New orders signed. Please notify her.  Electronically Signed By: Kathy Carmona PA-C

## 2021-07-15 ENCOUNTER — MYC MEDICAL ADVICE (OUTPATIENT)
Dept: FAMILY MEDICINE | Facility: CLINIC | Age: 71
End: 2021-07-15

## 2021-07-21 ENCOUNTER — TELEPHONE (OUTPATIENT)
Dept: FAMILY MEDICINE | Facility: CLINIC | Age: 71
End: 2021-07-21

## 2021-07-21 ENCOUNTER — TRANSFERRED RECORDS (OUTPATIENT)
Dept: HEALTH INFORMATION MANAGEMENT | Facility: CLINIC | Age: 71
End: 2021-07-21

## 2021-07-21 NOTE — TELEPHONE ENCOUNTER
Reason for Call:  Form, our goal is to have forms completed with 72 hours, however, some forms may require a visit or additional information.    Type of letter, form or note:  medical    Who is the form from?: Home care    Where did the form come from: form was faxed in    What clinic location was the form placed at?: [facility]    Where the form was placed: Kathy Carmona Box/Folder    What number is listed as a contact on the form?: [phone]       Additional comments: Kathy Carmona signed original when Dr Gardiner was out, but routing to Dr Gardiner for these additional orders     Call taken on 7/21/2021 at 9:10 AM by Agnieszka Toledo

## 2021-07-21 NOTE — TELEPHONE ENCOUNTER
Form faxed to [facility] @ # [phone].     Original sent to be scanned.     Junior Jimenez

## 2021-08-16 ENCOUNTER — OFFICE VISIT (OUTPATIENT)
Dept: FAMILY MEDICINE | Facility: CLINIC | Age: 71
End: 2021-08-16
Payer: COMMERCIAL

## 2021-08-16 ENCOUNTER — ALLIED HEALTH/NURSE VISIT (OUTPATIENT)
Dept: NURSING | Facility: CLINIC | Age: 71
End: 2021-08-16
Payer: COMMERCIAL

## 2021-08-16 VITALS
SYSTOLIC BLOOD PRESSURE: 160 MMHG | HEART RATE: 114 BPM | DIASTOLIC BLOOD PRESSURE: 94 MMHG | TEMPERATURE: 99.2 F | WEIGHT: 165 LBS | OXYGEN SATURATION: 96 % | BODY MASS INDEX: 33.33 KG/M2

## 2021-08-16 VITALS
TEMPERATURE: 99.2 F | OXYGEN SATURATION: 95 % | BODY MASS INDEX: 33.33 KG/M2 | DIASTOLIC BLOOD PRESSURE: 94 MMHG | WEIGHT: 165 LBS | HEART RATE: 113 BPM | SYSTOLIC BLOOD PRESSURE: 160 MMHG

## 2021-08-16 DIAGNOSIS — R21 PAPULAR RASH: Primary | ICD-10-CM

## 2021-08-16 DIAGNOSIS — R21 RASH: Primary | ICD-10-CM

## 2021-08-16 PROCEDURE — 99213 OFFICE O/P EST LOW 20 MIN: CPT | Performed by: FAMILY MEDICINE

## 2021-08-16 PROCEDURE — 99207 PR NO CHARGE NURSE ONLY: CPT

## 2021-08-16 RX ORDER — PREDNISONE 20 MG/1
TABLET ORAL
Qty: 20 TABLET | Refills: 0 | Status: SHIPPED | OUTPATIENT
Start: 2021-08-16 | End: 2021-09-13

## 2021-08-16 NOTE — PATIENT INSTRUCTIONS
Please take prednisone in the morning to help with avoiding sleeping issues at night   Take zyrtec 10 mg daily   benadryl 25 mg every 4 - 6 hrs as needed for itching     Please follow up with call if things are not improving

## 2021-08-16 NOTE — PROGRESS NOTES
"Assessment & Plan       ICD-10-CM    1. Papular rash  R21 predniSONE (DELTASONE) 20 MG tablet       Discussed treatment/modality options, including risk and benefits, she desires:    1) zyrtec 10 mg AM    2) benadryl 25 mg every PM    3) prednisone burst and taper    4) play  for etiology    5) call if any issues    All diagnosis above reviewed and noted above, otherwise stable.      See [facility] orders for further details.        BMI:   Estimated body mass index is 33.33 kg/m  as calculated from the following:    Height as of 1/29/21: 1.499 m (4' 11\").    Weight as of this encounter: 74.8 kg (165 lb).   Weight management plan: diet and exercise    Return in about 1 week (around 8/23/2021) for Follow Up Acute, Medication Recheck Visit.    No LOS data to display    Doing chart review, history and exam, documentation and further activities as noted.           Ken Gardiner MD, FAAFP     [facility]  [address] [place] [address]  [email or web address]   Office: [phone]  Fax: [phone]  Pager: [phone]     Subjective      Hannah is a 71 year old who presents for the following health issues     HPI     Rash - seen as walk in - notes onset approximately 9 days ago - new lysol detergent additive 8-9 days ago - mild fine papular lesions on inner right wrist - rt buttock fine diffuse thickened warm moderate papular lesions with spread anteriorly and to the left - no discharge, no vesicular changes - using benadryl - no known contacts    Onset: 8/9/2021    Description:   Location: right butt cheek and right wrist and forearm   Character: round, raised, red  Itching (Pruritis): YES    Progression of Symptoms:  worsening    Accompanying Signs & Symptoms:  Fever: no   Body aches or joint pain: no   Sore throat symptoms: no   Recent cold symptoms: no     History:   Previous similar rash: no     Precipitating factors: "   Exposure to similar rash: no   New exposures: clothes detergant Lysol soap    Recent travel: no     Alleviating factors:  nothing      Therapies Tried and outcome: benadryl,  Cortisone cream,      LOV: 1/21/2021 for PX     Review of Systems   CONSTITUTIONAL: NEGATIVE for fever, chills, change in weight  INTEGUMENTARY/SKIN: rash arms right and buttocks right - fine  Papular rash  - it diffuse and thicken more to right than left in the buttocks  ENT/MOUTH: NEGATIVE for ear, mouth and throat problems  RESP: NEGATIVE for significant cough or SOB  CV: NEGATIVE for chest pain, palpitations or peripheral edema      Objective    BP (!) 160/94   Pulse 114   Temp 99.2  F (37.3  C)   Wt 74.8 kg (165 lb)   LMP 06/03/2004   SpO2 96%   BMI 33.33 kg/m    Body mass index is 33.33 kg/m .  Physical Exam   GENERAL: healthy, alert and no distress  EYES: Eyes grossly normal to inspection, PERRL and conjunctivae and sclerae normal  HENT: ear canals and TM's normal, nose and mouth without ulcers or lesions  NECK: no adenopathy, no asymmetry, masses, or scars and thyroid normal to palpation  RESP: lungs clear to auscultation - no rales, rhonchi or wheezes  CV: regular rate and rhythm, normal S1 S2, no S3 or S4, no murmur, click or rub, no peripheral edema and peripheral pulses strong  MS: no gross musculoskeletal defects noted, no edema  SKIN: as above  NEURO: Normal strength and tone, mentation intact and speech normal  PSYCH: mentation appears normal, affect normal/bright

## 2021-08-16 NOTE — PROGRESS NOTES
Rash  Onset: 8/9/2021    Description:   Location: right butt cheek and right wrist ad forearm   Character: round, raised, red  Itching (Pruritis): YES    Progression of Symptoms:  worsening    Accompanying Signs & Symptoms:  Fever: no   Body aches or joint pain: no   Sore throat symptoms: no   Recent cold symptoms: no     History:   Previous similar rash: no     Precipitating factors:   Exposure to similar rash: no   New exposures: clothes detergant Lysol soap    Recent travel: no     Alleviating factors:  nothing     Therapies Tried and outcome: benadryl,  Cortisone cream,     LOV: 1/21/2021 for PX     Current Outpatient Medications   Medication     aspirin EC 81 MG tablet     atorvastatin (LIPITOR) 20 MG tablet     balsalazide (COLAZAL) 750 MG capsule     cholecalciferol (VITAMIN D3) 5000 UNITS CAPS capsule     multivitamin  peds with iron (FLINTSTONES COMPLETE) 60 MG chewable tablet     No current facility-administered medications for this visit.     Advised pt to be seen     Made OV   See OV notes     Katerina Kennedy RN, BSN  [facility] Triage

## 2021-08-17 ENCOUNTER — LAB (OUTPATIENT)
Dept: LAB | Facility: CLINIC | Age: 71
End: 2021-08-17
Payer: COMMERCIAL

## 2021-08-17 DIAGNOSIS — E05.90 SUBCLINICAL HYPERTHYROIDISM: ICD-10-CM

## 2021-08-17 LAB
T3FREE SERPL-MCNC: 2.1 PG/ML (ref 2.3–4.2)
T4 FREE SERPL-MCNC: 1.01 NG/DL (ref 0.76–1.46)
TSH SERPL DL<=0.005 MIU/L-ACNC: 0.13 MU/L (ref 0.4–4)

## 2021-08-17 PROCEDURE — 84439 ASSAY OF FREE THYROXINE: CPT

## 2021-08-17 PROCEDURE — 84481 FREE ASSAY (FT-3): CPT

## 2021-08-17 PROCEDURE — 84443 ASSAY THYROID STIM HORMONE: CPT

## 2021-08-17 PROCEDURE — 36415 COLL VENOUS BLD VENIPUNCTURE: CPT

## 2021-08-24 ENCOUNTER — OFFICE VISIT (OUTPATIENT)
Dept: ENDOCRINOLOGY | Facility: CLINIC | Age: 71
End: 2021-08-24
Payer: COMMERCIAL

## 2021-08-24 VITALS
SYSTOLIC BLOOD PRESSURE: 155 MMHG | OXYGEN SATURATION: 95 % | TEMPERATURE: 99.8 F | BODY MASS INDEX: 33.73 KG/M2 | WEIGHT: 167 LBS | HEART RATE: 92 BPM | DIASTOLIC BLOOD PRESSURE: 96 MMHG

## 2021-08-24 DIAGNOSIS — E05.90 SUBCLINICAL HYPERTHYROIDISM: Primary | ICD-10-CM

## 2021-08-24 PROCEDURE — 99213 OFFICE O/P EST LOW 20 MIN: CPT | Performed by: INTERNAL MEDICINE

## 2021-08-24 ASSESSMENT — PAIN SCALES - GENERAL: PAINLEVEL: NO PAIN (0)

## 2021-08-24 NOTE — LETTER
8/24/2021         RE: Jael Car  [address]  [place] [address]        Dear Colleague,    Thank you for referring your patient, Jael Car, to the [facility] [place]. Please see a copy of my visit note below.        Recent issues:  Hyperthyroidism follow-up evaluation  Feeling well overall, no new health issues reported  Reviewed medical history from patient and Epic chart record        History of low TSH with subclinical hyperthyroidism  5/2019 labs showed low TSH 0.23 and normal FT4 1.04.  Had seen Dr. Jose Muñoz, then Dr. Ken Gardiner    1/16/21. Thyroid nuclear scan:   Thyroid gland appears relatively normal in size   Homogeneous thyroid uptake. 24-hour uptake was calculated at 28.6% (normal 10-30%)    Previous [facility] thyroid tests include:     Lab Test 12/16/20  0917 10/21/20  0844 01/24/20  1535 12/18/19  1019 05/08/19  1040 12/19/14  1637 12/19/14  1637   TSH 0.29* 0.67 0.27* 0.30* 0.23*   < > 0.46   T4 1.26 1.30 1.12 1.20 1.04   < > 1.07   FT3  --   --   --   --   --   --  3.1    < > = values in this interval not displayed.     Additional health history:  Previous thyroid nodules:  none  Neck radiation treatments: none  Thyroid medication use: none  Fam Hx thyroid disease: none      4/23/21. Initial thyroid evaluation with me at [place]  Reviewed health history and thyroid issues.  Continued observation plan for the presumed mild hyperthyroidism  Recent [facility] labs include:  Lab Results   Component Value Date    TSH 0.13 (L) 08/17/2021    T4 1.01 08/17/2021    FT3 2.1 (L) 08/17/2021         Lives in [place], [place]  Sees Dr. Ken Gardiner/[facility] for general medicine evaluations.    PMH/PSH:  Past Medical History:   Diagnosis Date     Colon polyps 12/2017    tubular adenoma x 1 - 3mm - due 5 yrs     DVT (deep venous thrombosis) (H) 2011    PE - postop     Elevated serum protein level     Protein S     History of thrombophlebitis      Hyperlipidemia LDL goal  <100 2006     Microscopic hematuria 2019    work up negative - Dr Redmond     MTHFR mutation (H)      Mumps      Osteoarthrosis of knee 2014    Dr Augustine     Osteopenia      Prediabetes 2020     Pulmonary emboli (H) 2011    Post op     Pulmonary embolism and infarction (H) 10/05/2011     Problem list name updated by automated process. 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    [facility] - [facility] Telles/[facility] - left sided     Past Surgical History:   Procedure Laterality Date     COLONOSCOPY  09, 9/15, 12/17    colitis, tubular adenoma x 1 - 3mm - diverticulosis - due 5 yrs     FLEXIBLE SIGMOIDOSCOPY  12/2018    benign, quiscient UC     SIGMOIDOSCOPY FLEXIBLE  09/2017    rectosigmoiditis     SURGICAL HISTORY OF -  Right 2011    Rt Rotator Cuff       Family Hx:  Family History   Problem Relation Age of Onset     Alcohol/Drug Father      Lipids Father         high cholesterol     Diabetes Paternal Grandmother      Diabetes Son         diet controlled     Lung Cancer Maternal Grandfather         smoker     Coronary Artery Disease Brother          Social Hx:  Social History     Socioeconomic History     Marital status:      Spouse name: Anand     Number of children: 1     Years of education: 12     Highest education level: Not on file   Occupational History     Not on file   Tobacco Use     Smoking status: Never Smoker     Smokeless tobacco: Never Used     Tobacco comment: 30 years ago   Substance and Sexual Activity     Alcohol use: Yes     Alcohol/week: 2.0 - 4.0 standard drinks     Types: 2 - 4 Standard drinks or equivalent per week     Comment: 1-2 drinks about 2 times/week     Drug use: Never     Sexual activity: Not Currently     Partners: Male     Birth control/protection: None   Other Topics Concern     Parent/sibling w/ CABG, MI or angioplasty before 65F 55M? No      Service Not Asked     Blood Transfusions Not Asked      Caffeine Concern Yes     Comment: 2-4 cans per week     Occupational Exposure Not Asked     Hobby Hazards Not Asked     Sleep Concern Not Asked     Stress Concern Not Asked     Weight Concern Not Asked     Special Diet Not Asked     Back Care Not Asked     Exercise Yes     Comment: 6,000 to 9,000 steps daily     Bike Helmet Not Asked     Seat Belt Yes     Self-Exams Not Asked   Social History Narrative     Not on file     Social Determinants of Health     Financial Resource Strain:      Difficulty of Paying Living Expenses:    Food Insecurity:      Worried About Running Out of Food in the Last Year:      Ran Out of Food in the Last Year:    Transportation Needs:      Lack of Transportation (Medical):      Lack of Transportation (Non-Medical):    Physical Activity:      Days of Exercise per Week:      Minutes of Exercise per Session:    Stress:      Feeling of Stress :    Social Connections:      Frequency of Communication with Friends and Family:      Frequency of Social Gatherings with Friends and Family:      Attends [employer] Services:      Active Member of Clubs or Organizations:      Attends Club or Organization Meetings:      Marital Status:    Intimate Partner Violence:      Fear of Current or Ex-Partner:      Emotionally Abused:      Physically Abused:      Sexually Abused:           MEDICATIONS:  has a current medication list which includes the following prescription(s): aspirin, atorvastatin, balsalazide, cholecalciferol, childrens multivitamin w/iron, and prednisone.    ROS:     ROS: 10 point ROS neg other than the symptoms noted above in the HPI.    GENERAL: energy good, wt stable; denies fevers, chills, night sweats.   HEENT: no dysphagia, odonophagia, diplopia, neck pain  THYROID:  no apparent hyper or hypothyroid symptoms  CV: no chest pain, pressure, palpitations  LUNGS: no SOB, COOK, cough, wheezing   ABDOMEN:  Some soft stools but no diarrhea, constipation, abdominal pain  EXTREMITIES: occasional  right ankle swelling; no tremors, rashes, ulcers  NEUROLOGY: no headaches, denies changes in vision, tingling, extremitiy numbness   MSK: some back pains; denies muscle weakness  SKIN: no rashes or lesions  : no menses  PSYCH:  stable mood, no significant anxiety or depression  ENDOCRINE: no heat or cold intolerance      Physical Exam   VS: BP (!) 155/96   Pulse 92   Temp 99.8  F (37.7  C) (Oral)   Wt 75.8 kg (167 lb)   LMP 06/03/2004   SpO2 95%   BMI 33.73 kg/m    GENERAL: AXOX3, NAD, short stature, well dressed, answering questions appropriately, appears stated age.  ENT: broad neck, no nose swelling or nasal discharge, mouth redness or gum changes.  EYES: eyes grossly normal to inspection, conjunctivae and sclerae normal, no exophthalmos or proptosis  THYROID:  no apparent nodules or goiter  LUNGS: no audible wheeze, cough or visible cyanosis, or increased work of breathing  ABDOMEN: abdomen size  EXTREMITIES: no edema noted  NEUROLOGY: CN grossly intact, no tremors  MSK: grossly intact  SKIN:  no apparent skin lesions, rash, or edema with visualized skin appearance  PSYCH: mentation appears normal, affect normal/bright, judgement and insight intact,   normal speech and appearance well groomed      LABS:    All pertinent notes, labs, and images personally reviewed by me.     A/P:  Encounter Diagnosis   Name Primary?     Subclinical hyperthyroidism Yes       Comments:  Reviewed health history and the hyperthyroidism issues  Previous radioiodine thyroid uptake and scan procedure normal  No significant changes with the low TSH and normal T4 trends and no hyperthyroid symptoms noted.  Reviewed and interpreted tests that I previously ordered.   Ordered appropriate tests for the endocrinology disease management.    Management options discussed and implemented after shared medical decision making with the patient.    Plan:  Discussed general issues with the hyperthyroidism diagnosis and management  Reviewed  thyroid gland anatomy and hormone physiology  Discussed lab tests used to assess patient thyroid hormone levels  Reviewed imaging procedure options with the 24-hr radioiodine uptake/scan, also thyroid ultrasound  We discussed treatment options for hyperthyroidism    Recommend:  Advise continuing the current observation plan for the subclinical hyperthyroidism  Plan repeat thyroid lab testing in 12/2021 or 1/2022   Discussed the [facility] clinic option   Lab orders placed  Monitor for symptom changes  Consider future thyroid imaging with thyroid U/S  Would not start antithyroid medication treatment at this time    Patient to follow up with their Primary Care Provider regarding the elevated blood pressure.  Addressed patient questions today    There are no Patient Instructions on file for this visit.    Future labs ordered today:   Orders Placed This Encounter   Procedures     TSH     T4 free     Radiology/Consults ordered today: None    Total time spent in with the patient evaluation:  10 min  Additional time spent reviewing pertinent lab tests and chart notes, and documentation:  10 min    Follow-up:  2/2022    LIZZY Everett MD, MS  Endocrinology  [facility]    CC: Ken Gardiner         Again, thank you for allowing me to participate in the care of your patient.        Sincerely,        Rony Everett MD

## 2021-08-24 NOTE — PROGRESS NOTES
Recent issues:  Hyperthyroidism follow-up evaluation  Feeling well overall, no new health issues reported  Reviewed medical history from patient and Epic chart record        History of low TSH with subclinical hyperthyroidism  5/2019 labs showed low TSH 0.23 and normal FT4 1.04.  Had seen Dr. Jose Muñoz, then Dr. Ken Gardiner    1/16/21. Thyroid nuclear scan:   Thyroid gland appears relatively normal in size   Homogeneous thyroid uptake. 24-hour uptake was calculated at 28.6% (normal 10-30%)    Previous  thyroid tests include:     Lab Test 12/16/20  0917 10/21/20  0844 01/24/20  1535 12/18/19  1019 05/08/19  1040 12/19/14  1637 12/19/14  1637   TSH 0.29* 0.67 0.27* 0.30* 0.23*   < > 0.46   T4 1.26 1.30 1.12 1.20 1.04   < > 1.07   FT3  --   --   --   --   --   --  3.1    < > = values in this interval not displayed.     Additional health history:  Previous thyroid nodules:  none  Neck radiation treatments: none  Thyroid medication use: none  Fam Hx thyroid disease: none      4/23/21. Initial thyroid evaluation with me at [place]  Reviewed health history and thyroid issues.  Continued observation plan for the presumed mild hyperthyroidism  Recent [facility] labs include:  Lab Results   Component Value Date    TSH 0.13 (L) 08/17/2021    T4 1.01 08/17/2021    FT3 2.1 (L) 08/17/2021         Lives in [place], [place]  Sees Dr. Ken Gardiner/[facility] for general medicine evaluations.    PMH/PSH:  Past Medical History:   Diagnosis Date     Colon polyps 12/2017    tubular adenoma x 1 - 3mm - due 5 yrs     DVT (deep venous thrombosis) (H) 2011    PE - postop     Elevated serum protein level     Protein S     History of thrombophlebitis      Hyperlipidemia LDL goal <100 2006     Microscopic hematuria 2019    work up negative - Dr Redmond     MTHFR mutation (H)      Mumps      Osteoarthrosis of knee 2014    Dr Augustine     Osteopenia      Prediabetes 2020     Pulmonary emboli (H) 2011    Post op     Pulmonary embolism  and infarction (H) 10/05/2011     Problem list name updated by automated process. 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    [facility] - [facility] Telles/[facility] - left sided     Past Surgical History:   Procedure Laterality Date     COLONOSCOPY  09, 9/15, 12/17    colitis, tubular adenoma x 1 - 3mm - diverticulosis - due 5 yrs     FLEXIBLE SIGMOIDOSCOPY  12/2018    benign, quiscient UC     SIGMOIDOSCOPY FLEXIBLE  09/2017    rectosigmoiditis     SURGICAL HISTORY OF -  Right 2011    Rt Rotator Cuff       Family Hx:  Family History   Problem Relation Age of Onset     Alcohol/Drug Father      Lipids Father         high cholesterol     Diabetes Paternal Grandmother      Diabetes Son         diet controlled     Lung Cancer Maternal Grandfather         smoker     Coronary Artery Disease Brother          Social Hx:  Social History     Socioeconomic History     Marital status:      Spouse name: Anand     Number of children: 1     Years of education: 12     Highest education level: Not on file   Occupational History     Not on file   Tobacco Use     Smoking status: Never Smoker     Smokeless tobacco: Never Used     Tobacco comment: 30 years ago   Substance and Sexual Activity     Alcohol use: Yes     Alcohol/week: 2.0 - 4.0 standard drinks     Types: 2 - 4 Standard drinks or equivalent per week     Comment: 1-2 drinks about 2 times/week     Drug use: Never     Sexual activity: Not Currently     Partners: Male     Birth control/protection: None   Other Topics Concern     Parent/sibling w/ CABG, MI or angioplasty before 65F 55M? No      Service Not Asked     Blood Transfusions Not Asked     Caffeine Concern Yes     Comment: 2-4 cans per week     Occupational Exposure Not Asked     Hobby Hazards Not Asked     Sleep Concern Not Asked     Stress Concern Not Asked     Weight Concern Not Asked     Special Diet Not Asked     Back Care Not Asked      Exercise Yes     Comment: 6,000 to 9,000 steps daily     Bike Helmet Not Asked     Seat Belt Yes     Self-Exams Not Asked   Social History Narrative     Not on file     Social Determinants of Health     Financial Resource Strain:      Difficulty of Paying Living Expenses:    Food Insecurity:      Worried About Running Out of Food in the Last Year:      Ran Out of Food in the Last Year:    Transportation Needs:      Lack of Transportation (Medical):      Lack of Transportation (Non-Medical):    Physical Activity:      Days of Exercise per Week:      Minutes of Exercise per Session:    Stress:      Feeling of Stress :    Social Connections:      Frequency of Communication with Friends and Family:      Frequency of Social Gatherings with Friends and Family:      Attends Yarsani Services:      Active Member of Clubs or Organizations:      Attends Club or Organization Meetings:      Marital Status:    Intimate Partner Violence:      Fear of Current or Ex-Partner:      Emotionally Abused:      Physically Abused:      Sexually Abused:           MEDICATIONS:  has a current medication list which includes the following prescription(s): aspirin, atorvastatin, balsalazide, cholecalciferol, childrens multivitamin w/iron, and prednisone.    ROS:     ROS: 10 point ROS neg other than the symptoms noted above in the HPI.    GENERAL: energy good, wt stable; denies fevers, chills, night sweats.   HEENT: no dysphagia, odonophagia, diplopia, neck pain  THYROID:  no apparent hyper or hypothyroid symptoms  CV: no chest pain, pressure, palpitations  LUNGS: no SOB, COOK, cough, wheezing   ABDOMEN:  Some soft stools but no diarrhea, constipation, abdominal pain  EXTREMITIES: occasional right ankle swelling; no tremors, rashes, ulcers  NEUROLOGY: no headaches, denies changes in vision, tingling, extremitiy numbness   MSK: some back pains; denies muscle weakness  SKIN: no rashes or lesions  : no menses  PSYCH:  stable mood, no  significant anxiety or depression  ENDOCRINE: no heat or cold intolerance      Physical Exam   VS: BP (!) 155/96   Pulse 92   Temp 99.8  F (37.7  C) (Oral)   Wt 75.8 kg (167 lb)   LMP 06/03/2004   SpO2 95%   BMI 33.73 kg/m    GENERAL: AXOX3, NAD, short stature, well dressed, answering questions appropriately, appears stated age.  ENT: broad neck, no nose swelling or nasal discharge, mouth redness or gum changes.  EYES: eyes grossly normal to inspection, conjunctivae and sclerae normal, no exophthalmos or proptosis  THYROID:  no apparent nodules or goiter  LUNGS: no audible wheeze, cough or visible cyanosis, or increased work of breathing  ABDOMEN: abdomen size  EXTREMITIES: no edema noted  NEUROLOGY: CN grossly intact, no tremors  MSK: grossly intact  SKIN:  no apparent skin lesions, rash, or edema with visualized skin appearance  PSYCH: mentation appears normal, affect normal/bright, judgement and insight intact,   normal speech and appearance well groomed      LABS:    All pertinent notes, labs, and images personally reviewed by me.     A/P:  Encounter Diagnosis   Name Primary?     Subclinical hyperthyroidism Yes       Comments:  Reviewed health history and the hyperthyroidism issues  Previous radioiodine thyroid uptake and scan procedure normal  No significant changes with the low TSH and normal T4 trends and no hyperthyroid symptoms noted.  Reviewed and interpreted tests that I previously ordered.   Ordered appropriate tests for the endocrinology disease management.    Management options discussed and implemented after shared medical decision making with the patient.    Plan:  Discussed general issues with the hyperthyroidism diagnosis and management  Reviewed thyroid gland anatomy and hormone physiology  Discussed lab tests used to assess patient thyroid hormone levels  Reviewed imaging procedure options with the 24-hr radioiodine uptake/scan, also thyroid ultrasound  We discussed treatment options for  hyperthyroidism    Recommend:  Advise continuing the current observation plan for the subclinical hyperthyroidism  Plan repeat thyroid lab testing in 12/2021 or 1/2022   Discussed the [facility] clinic option   Lab orders placed  Monitor for symptom changes  Consider future thyroid imaging with thyroid U/S  Would not start antithyroid medication treatment at this time    Patient to follow up with their Primary Care Provider regarding the elevated blood pressure.  Addressed patient questions today    There are no Patient Instructions on file for this visit.    Future labs ordered today:   Orders Placed This Encounter   Procedures     TSH     T4 free     Radiology/Consults ordered today: None    Total time spent in with the patient evaluation:  10 min  Additional time spent reviewing pertinent lab tests and chart notes, and documentation:  10 min    Follow-up:  2/2022    LIZZY Everett MD, MS  Endocrinology  [facility]    CC: Ken Gardiner

## 2021-09-04 ENCOUNTER — HEALTH MAINTENANCE LETTER (OUTPATIENT)
Age: 71
End: 2021-09-04

## 2021-09-12 ENCOUNTER — MYC MEDICAL ADVICE (OUTPATIENT)
Dept: FAMILY MEDICINE | Facility: CLINIC | Age: 71
End: 2021-09-12

## 2021-09-12 DIAGNOSIS — R21 PAPULAR RASH: ICD-10-CM

## 2021-09-13 RX ORDER — PREDNISONE 20 MG/1
TABLET ORAL
Qty: 20 TABLET | Refills: 0 | Status: SHIPPED | OUTPATIENT
Start: 2021-09-13 | End: 2021-10-25

## 2021-09-13 NOTE — TELEPHONE ENCOUNTER
LOV 8/16/2021 for rash     Please see my chart message below     Please review and advise     Thank you     Katerina Kennedy RN, BSN  [place] [facility]

## 2021-10-22 ENCOUNTER — MYC MEDICAL ADVICE (OUTPATIENT)
Dept: FAMILY MEDICINE | Facility: CLINIC | Age: 71
End: 2021-10-22

## 2021-10-25 RX ORDER — CLOBETASOL PROPIONATE 0.5 MG/G
CREAM TOPICAL 2 TIMES DAILY
COMMUNITY
Start: 2021-10-25

## 2021-11-10 ENCOUNTER — MYC MEDICAL ADVICE (OUTPATIENT)
Dept: FAMILY MEDICINE | Facility: CLINIC | Age: 71
End: 2021-11-10
Payer: COMMERCIAL

## 2021-11-17 ENCOUNTER — HOSPITAL ENCOUNTER (OUTPATIENT)
Dept: MAMMOGRAPHY | Facility: CLINIC | Age: 71
Discharge: HOME | End: 2021-11-17
Attending: FAMILY MEDICINE | Admitting: FAMILY MEDICINE
Payer: COMMERCIAL

## 2021-11-17 DIAGNOSIS — Z12.31 VISIT FOR SCREENING MAMMOGRAM: ICD-10-CM

## 2021-11-17 PROCEDURE — 77063 BREAST TOMOSYNTHESIS BI: CPT

## 2021-11-23 ENCOUNTER — LAB (OUTPATIENT)
Dept: LAB | Facility: CLINIC | Age: 71
End: 2021-11-23
Payer: COMMERCIAL

## 2021-11-23 DIAGNOSIS — Z11.59 ENCOUNTER FOR SCREENING FOR OTHER VIRAL DISEASES: ICD-10-CM

## 2021-11-23 PROCEDURE — 99000 SPECIMEN HANDLING OFFICE-LAB: CPT

## 2021-11-23 PROCEDURE — 36415 COLL VENOUS BLD VENIPUNCTURE: CPT

## 2021-11-23 PROCEDURE — 86769 SARS-COV-2 COVID-19 ANTIBODY: CPT | Mod: 90

## 2021-11-24 LAB — SARS-COV-2 AB SERPL QL IA: POSITIVE

## 2021-11-26 ENCOUNTER — MYC MEDICAL ADVICE (OUTPATIENT)
Dept: FAMILY MEDICINE | Facility: CLINIC | Age: 71
End: 2021-11-26
Payer: COMMERCIAL

## 2021-12-01 NOTE — TELEPHONE ENCOUNTER
Please help pt schedule her first COID shot sooner than the 16th if possible?     Thank you     Katerina Kennedy RN, BSN  [facility]

## 2021-12-13 ENCOUNTER — TRANSFERRED RECORDS (OUTPATIENT)
Dept: HEALTH INFORMATION MANAGEMENT | Facility: CLINIC | Age: 71
End: 2021-12-13
Payer: COMMERCIAL

## 2021-12-13 LAB
ALT SERPL-CCNC: 11 LU/L (ref 0–32)
AST SERPL-CCNC: 19 LU/L (ref 0–40)
CREATININE (EXTERNAL): 0.66 MG/DL (ref 0.57–1)
GFR ESTIMATED (EXTERNAL): 90 ML/MIN/1.73
GFR ESTIMATED (IF AFRICAN AMERICAN) (EXTERNAL): 104 ML/MIN/1.73

## 2021-12-16 ENCOUNTER — IMMUNIZATION (OUTPATIENT)
Dept: NURSING | Facility: CLINIC | Age: 71
End: 2021-12-16
Payer: COMMERCIAL

## 2021-12-16 DIAGNOSIS — Z23 HIGH PRIORITY FOR 2019-NCOV VACCINE: Primary | ICD-10-CM

## 2021-12-16 PROCEDURE — 91300 COVID-19,PF,PFIZER (12+ YRS): CPT

## 2021-12-16 PROCEDURE — 0001A COVID-19,PF,PFIZER (12+ YRS): CPT

## 2021-12-16 PROCEDURE — 99207 PR NO CHARGE LOS: CPT

## 2021-12-28 ENCOUNTER — MYC MEDICAL ADVICE (OUTPATIENT)
Dept: FAMILY MEDICINE | Facility: CLINIC | Age: 71
End: 2021-12-28
Payer: COMMERCIAL

## 2021-12-28 DIAGNOSIS — Z15.89 MTHFR MUTATION: ICD-10-CM

## 2021-12-28 DIAGNOSIS — R77.9 ELEVATED SERUM PROTEIN LEVEL: ICD-10-CM

## 2021-12-28 DIAGNOSIS — K63.5 POLYP OF COLON, UNSPECIFIED PART OF COLON, UNSPECIFIED TYPE: ICD-10-CM

## 2021-12-28 DIAGNOSIS — Z12.11 SCREEN FOR COLON CANCER: ICD-10-CM

## 2021-12-28 DIAGNOSIS — Z86.718 HISTORY OF DEEP VENOUS THROMBOSIS: ICD-10-CM

## 2021-12-28 DIAGNOSIS — M15.0 PRIMARY OSTEOARTHRITIS INVOLVING MULTIPLE JOINTS: ICD-10-CM

## 2021-12-28 DIAGNOSIS — Z00.00 ROUTINE GENERAL MEDICAL EXAMINATION AT A HEALTH CARE FACILITY: Primary | ICD-10-CM

## 2021-12-28 DIAGNOSIS — Z86.711 HISTORY OF PULMONARY EMBOLISM: ICD-10-CM

## 2021-12-28 DIAGNOSIS — R73.03 PREDIABETES: ICD-10-CM

## 2021-12-28 DIAGNOSIS — Z51.81 MEDICATION MONITORING ENCOUNTER: ICD-10-CM

## 2021-12-28 DIAGNOSIS — Z12.31 ENCOUNTER FOR SCREENING MAMMOGRAM FOR MALIGNANT NEOPLASM OF BREAST: ICD-10-CM

## 2021-12-28 DIAGNOSIS — K51.30 ULCERATIVE RECTOSIGMOIDITIS WITHOUT COMPLICATION (H): ICD-10-CM

## 2021-12-28 DIAGNOSIS — E78.5 HYPERLIPIDEMIA LDL GOAL <100: ICD-10-CM

## 2021-12-28 DIAGNOSIS — M85.89 OSTEOPENIA OF MULTIPLE SITES: ICD-10-CM

## 2021-12-28 DIAGNOSIS — Z13.820 SCREENING FOR OSTEOPOROSIS: ICD-10-CM

## 2021-12-28 DIAGNOSIS — E05.90 SUBCLINICAL HYPERTHYROIDISM: ICD-10-CM

## 2021-12-28 NOTE — TELEPHONE ENCOUNTER
Please see my chart message below     Please review and advise     Thank you     Katerina Kennedy RN, BSN  [place] [facility]

## 2022-01-15 ASSESSMENT — ENCOUNTER SYMPTOMS
WEAKNESS: 0
NAUSEA: 0
PARESTHESIAS: 0
BREAST MASS: 0
JOINT SWELLING: 0
ARTHRALGIAS: 0
SHORTNESS OF BREATH: 0
PALPITATIONS: 0
DYSURIA: 0
COUGH: 0
DIARRHEA: 0
FREQUENCY: 0
CONSTIPATION: 0
HEARTBURN: 0
HEADACHES: 0
CHILLS: 0
SORE THROAT: 0
FEVER: 0
NERVOUS/ANXIOUS: 0
DIZZINESS: 0
MYALGIAS: 0
HEMATOCHEZIA: 0
ABDOMINAL PAIN: 0
HEMATURIA: 0
EYE PAIN: 0

## 2022-01-15 ASSESSMENT — ACTIVITIES OF DAILY LIVING (ADL): CURRENT_FUNCTION: NO ASSISTANCE NEEDED

## 2022-01-18 ENCOUNTER — TRANSFERRED RECORDS (OUTPATIENT)
Dept: HEALTH INFORMATION MANAGEMENT | Facility: CLINIC | Age: 72
End: 2022-01-18
Payer: COMMERCIAL

## 2022-01-19 ENCOUNTER — LAB (OUTPATIENT)
Dept: LAB | Facility: CLINIC | Age: 72
End: 2022-01-19
Payer: COMMERCIAL

## 2022-01-19 DIAGNOSIS — Z86.711 HISTORY OF PULMONARY EMBOLISM: ICD-10-CM

## 2022-01-19 DIAGNOSIS — E78.5 HYPERLIPIDEMIA LDL GOAL <100: ICD-10-CM

## 2022-01-19 DIAGNOSIS — R73.03 PREDIABETES: ICD-10-CM

## 2022-01-19 DIAGNOSIS — R77.9 ELEVATED SERUM PROTEIN LEVEL: ICD-10-CM

## 2022-01-19 DIAGNOSIS — E05.90 SUBCLINICAL HYPERTHYROIDISM: ICD-10-CM

## 2022-01-19 DIAGNOSIS — M15.0 PRIMARY OSTEOARTHRITIS INVOLVING MULTIPLE JOINTS: ICD-10-CM

## 2022-01-19 DIAGNOSIS — Z15.89 MTHFR MUTATION: ICD-10-CM

## 2022-01-19 DIAGNOSIS — Z86.718 HISTORY OF DEEP VENOUS THROMBOSIS: ICD-10-CM

## 2022-01-19 DIAGNOSIS — M85.89 OSTEOPENIA OF MULTIPLE SITES: ICD-10-CM

## 2022-01-19 DIAGNOSIS — Z13.820 SCREENING FOR OSTEOPOROSIS: ICD-10-CM

## 2022-01-19 DIAGNOSIS — Z00.00 ROUTINE GENERAL MEDICAL EXAMINATION AT A HEALTH CARE FACILITY: ICD-10-CM

## 2022-01-19 DIAGNOSIS — Z51.81 MEDICATION MONITORING ENCOUNTER: ICD-10-CM

## 2022-01-19 DIAGNOSIS — K51.30 ULCERATIVE RECTOSIGMOIDITIS WITHOUT COMPLICATION (H): ICD-10-CM

## 2022-01-19 LAB
ALBUMIN SERPL-MCNC: 3.3 G/DL (ref 3.4–5)
ALP SERPL-CCNC: 118 U/L (ref 40–150)
ALT SERPL W P-5'-P-CCNC: 19 U/L (ref 0–50)
ANION GAP SERPL CALCULATED.3IONS-SCNC: 7 MMOL/L (ref 3–14)
AST SERPL W P-5'-P-CCNC: 16 U/L (ref 0–45)
BILIRUB SERPL-MCNC: 0.5 MG/DL (ref 0.2–1.3)
BUN SERPL-MCNC: 9 MG/DL (ref 7–30)
CALCIUM SERPL-MCNC: 9.1 MG/DL (ref 8.5–10.1)
CHLORIDE BLD-SCNC: 105 MMOL/L (ref 94–109)
CHOLEST SERPL-MCNC: 147 MG/DL
CK SERPL-CCNC: 36 U/L (ref 30–225)
CO2 SERPL-SCNC: 31 MMOL/L (ref 20–32)
CREAT SERPL-MCNC: 0.63 MG/DL (ref 0.52–1.04)
CRP SERPL-MCNC: 4.7 MG/L (ref 0–8)
DEPRECATED CALCIDIOL+CALCIFEROL SERPL-MC: 67 UG/L (ref 20–75)
ERYTHROCYTE [DISTWIDTH] IN BLOOD BY AUTOMATED COUNT: 13.7 % (ref 10–15)
ERYTHROCYTE [SEDIMENTATION RATE] IN BLOOD BY WESTERGREN METHOD: 17 MM/HR (ref 0–30)
FASTING STATUS PATIENT QL REPORTED: YES
GFR SERPL CREATININE-BSD FRML MDRD: >90 ML/MIN/1.73M2
GLUCOSE BLD-MCNC: 108 MG/DL (ref 70–99)
HBA1C MFR BLD: 5.7 % (ref 0–5.6)
HCT VFR BLD AUTO: 40.3 % (ref 35–47)
HDLC SERPL-MCNC: 54 MG/DL
HGB BLD-MCNC: 13.1 G/DL (ref 11.7–15.7)
LDLC SERPL CALC-MCNC: 63 MG/DL
MCH RBC QN AUTO: 26.9 PG (ref 26.5–33)
MCHC RBC AUTO-ENTMCNC: 32.5 G/DL (ref 31.5–36.5)
MCV RBC AUTO: 83 FL (ref 78–100)
NONHDLC SERPL-MCNC: 93 MG/DL
PLATELET # BLD AUTO: 261 10E3/UL (ref 150–450)
POTASSIUM BLD-SCNC: 2.8 MMOL/L (ref 3.4–5.3)
PROT SERPL-MCNC: 7.4 G/DL (ref 6.8–8.8)
RBC # BLD AUTO: 4.87 10E6/UL (ref 3.8–5.2)
SODIUM SERPL-SCNC: 143 MMOL/L (ref 133–144)
T4 FREE SERPL-MCNC: 1.28 NG/DL (ref 0.76–1.46)
TRIGL SERPL-MCNC: 151 MG/DL
TSH SERPL DL<=0.005 MIU/L-ACNC: 0.17 MU/L (ref 0.4–4)
WBC # BLD AUTO: 6.2 10E3/UL (ref 4–11)

## 2022-01-19 PROCEDURE — 83036 HEMOGLOBIN GLYCOSYLATED A1C: CPT

## 2022-01-19 PROCEDURE — 82550 ASSAY OF CK (CPK): CPT

## 2022-01-19 PROCEDURE — 80061 LIPID PANEL: CPT

## 2022-01-19 PROCEDURE — 36415 COLL VENOUS BLD VENIPUNCTURE: CPT

## 2022-01-19 PROCEDURE — 85652 RBC SED RATE AUTOMATED: CPT

## 2022-01-19 PROCEDURE — 82306 VITAMIN D 25 HYDROXY: CPT

## 2022-01-19 PROCEDURE — 86140 C-REACTIVE PROTEIN: CPT

## 2022-01-19 PROCEDURE — 80053 COMPREHEN METABOLIC PANEL: CPT

## 2022-01-19 PROCEDURE — 85027 COMPLETE CBC AUTOMATED: CPT

## 2022-01-19 PROCEDURE — 84443 ASSAY THYROID STIM HORMONE: CPT

## 2022-01-19 PROCEDURE — 84439 ASSAY OF FREE THYROXINE: CPT

## 2022-01-21 ENCOUNTER — TELEPHONE (OUTPATIENT)
Dept: FAMILY MEDICINE | Facility: CLINIC | Age: 72
End: 2022-01-21

## 2022-01-21 ENCOUNTER — MYC MEDICAL ADVICE (OUTPATIENT)
Dept: FAMILY MEDICINE | Facility: CLINIC | Age: 72
End: 2022-01-21

## 2022-01-21 ENCOUNTER — OFFICE VISIT (OUTPATIENT)
Dept: FAMILY MEDICINE | Facility: CLINIC | Age: 72
End: 2022-01-21
Payer: COMMERCIAL

## 2022-01-21 VITALS
HEART RATE: 108 BPM | HEIGHT: 58 IN | BODY MASS INDEX: 31.49 KG/M2 | DIASTOLIC BLOOD PRESSURE: 94 MMHG | OXYGEN SATURATION: 95 % | RESPIRATION RATE: 16 BRPM | SYSTOLIC BLOOD PRESSURE: 154 MMHG | WEIGHT: 150 LBS | TEMPERATURE: 98.6 F

## 2022-01-21 DIAGNOSIS — Z86.718 HISTORY OF DEEP VENOUS THROMBOSIS: ICD-10-CM

## 2022-01-21 DIAGNOSIS — M15.0 PRIMARY OSTEOARTHRITIS INVOLVING MULTIPLE JOINTS: ICD-10-CM

## 2022-01-21 DIAGNOSIS — Z15.89 MTHFR MUTATION: ICD-10-CM

## 2022-01-21 DIAGNOSIS — Z23 HIGH PRIORITY FOR 2019-NCOV VACCINE: ICD-10-CM

## 2022-01-21 DIAGNOSIS — E87.6 HYPOKALEMIA: ICD-10-CM

## 2022-01-21 DIAGNOSIS — M85.89 OSTEOPENIA OF MULTIPLE SITES: ICD-10-CM

## 2022-01-21 DIAGNOSIS — R73.03 PREDIABETES: ICD-10-CM

## 2022-01-21 DIAGNOSIS — E05.90 SUBCLINICAL HYPERTHYROIDISM: ICD-10-CM

## 2022-01-21 DIAGNOSIS — Z00.00 ROUTINE GENERAL MEDICAL EXAMINATION AT A HEALTH CARE FACILITY: Primary | ICD-10-CM

## 2022-01-21 DIAGNOSIS — K63.5 POLYP OF COLON, UNSPECIFIED PART OF COLON, UNSPECIFIED TYPE: ICD-10-CM

## 2022-01-21 DIAGNOSIS — E87.6 HYPOKALEMIA: Primary | ICD-10-CM

## 2022-01-21 DIAGNOSIS — K51.30 ULCERATIVE RECTOSIGMOIDITIS WITHOUT COMPLICATION (H): ICD-10-CM

## 2022-01-21 DIAGNOSIS — Z12.11 SCREEN FOR COLON CANCER: ICD-10-CM

## 2022-01-21 DIAGNOSIS — E72.12 METHYLENETETRAHYDROFOLATE REDUCTASE DEFICIENCY (H): ICD-10-CM

## 2022-01-21 DIAGNOSIS — E78.5 HYPERLIPIDEMIA LDL GOAL <100: ICD-10-CM

## 2022-01-21 DIAGNOSIS — Z51.81 MEDICATION MONITORING ENCOUNTER: ICD-10-CM

## 2022-01-21 DIAGNOSIS — F43.21 GRIEF: ICD-10-CM

## 2022-01-21 DIAGNOSIS — Z86.711 HISTORY OF PULMONARY EMBOLISM: ICD-10-CM

## 2022-01-21 LAB
ALBUMIN UR-MCNC: 30 MG/DL
APPEARANCE UR: CLEAR
BACTERIA #/AREA URNS HPF: ABNORMAL /HPF
BILIRUB UR QL STRIP: NEGATIVE
COLOR UR AUTO: YELLOW
GLUCOSE UR STRIP-MCNC: NEGATIVE MG/DL
HGB UR QL STRIP: NEGATIVE
HYALINE CASTS #/AREA URNS LPF: ABNORMAL /LPF
KETONES UR STRIP-MCNC: ABNORMAL MG/DL
LEUKOCYTE ESTERASE UR QL STRIP: NEGATIVE
MUCOUS THREADS #/AREA URNS LPF: PRESENT /LPF
NITRATE UR QL: NEGATIVE
PH UR STRIP: 6 [PH] (ref 5–7)
RBC #/AREA URNS AUTO: ABNORMAL /HPF
SP GR UR STRIP: 1.02 (ref 1–1.03)
SQUAMOUS #/AREA URNS AUTO: ABNORMAL /LPF
UROBILINOGEN UR STRIP-ACNC: 0.2 E.U./DL
WBC #/AREA URNS AUTO: ABNORMAL /HPF

## 2022-01-21 PROCEDURE — 82043 UR ALBUMIN QUANTITATIVE: CPT

## 2022-01-21 PROCEDURE — 81001 URINALYSIS AUTO W/SCOPE: CPT

## 2022-01-21 PROCEDURE — 91305 COVID-19,PF,PFIZER (12+ YRS): CPT | Performed by: FAMILY MEDICINE

## 2022-01-21 PROCEDURE — 0052A COVID-19,PF,PFIZER (12+ YRS): CPT | Performed by: FAMILY MEDICINE

## 2022-01-21 PROCEDURE — 99397 PER PM REEVAL EST PAT 65+ YR: CPT | Performed by: FAMILY MEDICINE

## 2022-01-21 RX ORDER — POTASSIUM CHLORIDE 1.5 G/1.58G
40 POWDER, FOR SOLUTION ORAL DAILY
Qty: 60 PACKET | Refills: 1 | Status: SHIPPED | OUTPATIENT
Start: 2022-01-21 | End: 2022-01-26 | Stop reason: ALTCHOICE

## 2022-01-21 RX ORDER — ATORVASTATIN CALCIUM 20 MG/1
20 TABLET, FILM COATED ORAL DAILY
Qty: 90 TABLET | Refills: 3 | Status: SHIPPED | OUTPATIENT
Start: 2022-01-21 | End: 2023-01-23

## 2022-01-21 ASSESSMENT — ENCOUNTER SYMPTOMS
HEMATURIA: 0
HEADACHES: 0
DYSURIA: 0
MYALGIAS: 0
PALPITATIONS: 0
WEAKNESS: 0
COUGH: 0
ARTHRALGIAS: 0
JOINT SWELLING: 0
FEVER: 0
SORE THROAT: 0
PARESTHESIAS: 0
CONSTIPATION: 0
BREAST MASS: 0
NERVOUS/ANXIOUS: 0
HEARTBURN: 0
DIZZINESS: 0
HEMATOCHEZIA: 0
NAUSEA: 0
FREQUENCY: 0
ABDOMINAL PAIN: 0
SHORTNESS OF BREATH: 0
EYE PAIN: 0
CHILLS: 0
DIARRHEA: 0

## 2022-01-21 ASSESSMENT — MIFFLIN-ST. JEOR: SCORE: 1082.21

## 2022-01-21 ASSESSMENT — ACTIVITIES OF DAILY LIVING (ADL): CURRENT_FUNCTION: NO ASSISTANCE NEEDED

## 2022-01-21 NOTE — TELEPHONE ENCOUNTER
Called [facility] pharmacy    Pt paid cash for 6 days supply, recheck labs on 1/24    KCL  (Klor-con) tabs - will dissolve in water, they are covered, as needed - reviewed with pharmacy, may need new rx

## 2022-01-21 NOTE — TELEPHONE ENCOUNTER
Reason for Call:  Form, our goal is to have forms completed with 72 hours, however, some forms may require a visit or additional information.    Type of letter, form or note:  Prior Auth    Who is the form from?: [facility] (if other please explain)    Where did the form come from: form was faxed in    What clinic location was the form placed at?: [facility]    Where the form was placed: Dr Gardiner Box/Folder    What number is listed as a contact on the form?: [phone]           Call taken on 1/21/2022 at 4:14 PM by Aria Ro

## 2022-01-21 NOTE — TELEPHONE ENCOUNTER
Prior Authorization Retail Medication Request    Medication/Dose: Klor-con 20 MEQ  ICD code (if different than what is on RX):    Previously Tried and Failed:    Rationale:      Insurance Name:  In chart  Insurance ID:        Pharmacy Information (if different than what is on RX)  Name:  [facility]  Phone:  [phone]    Cover my meds key:  [id number]    plan does not cover, please send new RX or advise to start a AHMET Reyes

## 2022-01-21 NOTE — PROGRESS NOTES
"[facility]    SUBJECTIVE    Jael Car is a 71 year old female who presents for Preventive Visit.    Patient has been advised of split billing requirements and indicates understanding: Yes  Are you in the first 12 months of your Medicare coverage?  No    Healthy Habits:     In general, how would you rate your overall health?  Good    Frequency of exercise:  None    Do you usually eat at least 4 servings of fruit and vegetables a day, include whole grains    & fiber and avoid regularly eating high fat or \"junk\" foods?  Yes    Taking medications regularly:  Yes    Medication side effects:  None    Ability to successfully perform activities of daily living:  No assistance needed    Home Safety:  No safety concerns identified    Hearing Impairment:  No hearing concerns    In the past 6 months, have you been bothered by leaking of urine?  No    In general, how would you rate your overall mental or emotional health?  Good      PHQ-2 Total Score: 1    Additional concerns today:  No     Do you feel safe in your environment? Yes    Have you ever done Advance Care Planning? (For example, a Health Directive, POLST, or a discussion with a medical provider or your loved ones about your wishes): Yes, patient states has an Advance Care Planning document and will bring a copy to the clinic.    Fall risk  Fallen 2 or more times in the past year?: No  Any fall with injury in the past year?: No    Cognitive Screening   1) Repeat 3 items (Leader, Season, Table)   2) Clock draw: NORMAL  3) 3 item recall: Recalls 3 objects  Results: 3 items recalled: COGNITIVE IMPAIRMENT LESS LIKELY    Mini-CogTM Copyright WONG Martin. Licensed by the author for use in [facility]; reprinted with permission ([email or web address]). All rights reserved.      Reviewed and updated as needed this visit by clinical staff  Tobacco  Allergies  Meds           Reviewed and updated as needed this visit by Provider               Social " History     Tobacco Use     Smoking status: Never Smoker     Smokeless tobacco: Never Used     Tobacco comment: 30 years ago   Substance Use Topics     Alcohol use: Yes     Alcohol/week: 3.0 - 4.0 standard drinks     Types: 3 - 4 Standard drinks or equivalent per week     Comment: 1-2 drinks about 2 times/week     If you drink alcohol do you typically have >3 drinks per day or >7 drinks per week? No    Alcohol Use 2022   Prescreen: >3 drinks/day or >7 drinks/week? -   Prescreen: >3 drinks/day or >7 drinks/week? No     Hypothyroid - Dr Everett    TSH   Date Value Ref Range Status   2022 0.17 (L) 0.40 - 4.00 mU/L Final   2021 0.32 (L) 0.40 - 4.00 mU/L Final     Hyperlipidemia Follow-Up      Are you regularly taking any medication or supplement to lower your cholesterol?   Yes- Atorvastatin    Are you having muscle aches or other side effects that you think could be caused by your cholesterol lowering medication?  No    HX of PE/DVT/MTHFR -  - post op    Prediabetes    Glucose   Date Value Ref Range Status   2022 108 (H) 70 - 99 mg/dL Final   2020 104 (H) 70 - 99 mg/dL Final     Comment:     Fasting specimen   10/21/2020 111 (H) 70 - 99 mg/dL Final     Comment:     Fasting specimen   2019 108 (H) 70 - 99 mg/dL Final     Comment:     Fasting specimen   2019 104 (H) 70 - 99 mg/dL Final     Comment:     Fasting specimen   2019 120 (H) 70 - 99 mg/dL Final     Comment:     Fasting specimen     Lab Results   Component Value Date    A1C 5.7 2022    A1C 5.8 2020    A1C 5.7 2019    A1C 5.6 2019    A1C 5.7 10/31/2018    A1C 5.7 2015     Grieving -   of COVID in 2021    Low potassium - has not been eating    Potassium   Date Value Ref Range Status   2022 2.8 (L) 3.4 - 5.3 mmol/L Final   2020 3.5 3.4 - 5.3 mmol/L Final     Osteopenia    HX of UC - Dr Antonio - doing well    Current providers sharing in care for this patient  include:   Patient Care Team:  Ken Gardiner MD as PCP - General (Family Practice)  Ken Gardiner MD as Assigned PCP  Vesna Egan RD as Diabetes Educator (Dietitian, Registered)  Yeo, Albert, MD as Assigned Musculoskeletal Provider  Rony Everett MD as Assigned Endocrinology Provider  Rony Everett MD as MD (Endocrinology, Diabetes, and Metabolism)  Christina Jones PA-C as Physician Assistant (Dermatology)  Ken Gardiner MD as Referring Physician (Family Medicine)    The following health maintenance items are reviewed in Epic and correct as of today:  Health Maintenance Due   Topic Date Due     ZOSTER IMMUNIZATION (3 of 3) 03/17/2021     Review of Systems   Constitutional: Negative for chills and fever.   HENT: Negative for congestion, ear pain, hearing loss and sore throat.    Eyes: Negative for pain and visual disturbance.   Respiratory: Negative for cough and shortness of breath.    Cardiovascular: Negative for chest pain, palpitations and peripheral edema.   Gastrointestinal: Negative for abdominal pain, constipation, diarrhea, heartburn, hematochezia and nausea.   Breasts:  Negative for tenderness, breast mass and discharge.   Genitourinary: Negative for dysuria, frequency, genital sores, hematuria, pelvic pain, urgency, vaginal bleeding and vaginal discharge.   Musculoskeletal: Negative for arthralgias, joint swelling and myalgias.   Skin: Negative for rash.   Neurological: Negative for dizziness, weakness, headaches and paresthesias.   Psychiatric/Behavioral: Negative for mood changes. The patient is not nervous/anxious.      Health Maintenance     Colonoscopy:  Due 8/2022   FIT:  NA              Mammogram:  Due 11/2022              PAP:  NA   DEXA:  Due 7/2023    Health Maintenance Due   Topic Date Due     ZOSTER IMMUNIZATION (3 of 3) 03/17/2021       Current Problem List    Patient Active Problem List   Diagnosis     Chondromalacia of patella     Hyperlipidemia LDL goal <100     S/P  shoulder surgery     Advanced directives, counseling/discussion     Rotator cuff (capsule) sprain     Osteopenia     Carpal tunnel syndrome     Hyperglycemia     Subclinical hyperthyroidism     Osteoarthrosis of knee     History of pulmonary embolism     Primary osteoarthritis of right knee     MTHFR mutation     History of deep venous thrombosis     Elevated serum protein level     Ulcerative rectosigmoiditis 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 (H)     Microscopic hematuria     Colon polyps     Primary osteoarthritis involving multiple joints     Class 1 obesity with serious comorbidity and body mass index (BMI) of 32.0 to 32.9 in adult, unspecified obesity type     Prediabetes     Methylenetetrahydrofolate reductase deficiency (H)       Past Medical History    Past Medical History:   Diagnosis Date     Colon polyps 12/2017    tubular adenoma x 1 - 3mm - due 5 yrs     DVT (deep venous thrombosis) (H) 2011    PE - postop     Elevated serum protein level     Protein S     History of thrombophlebitis      Hyperlipidemia LDL goal <100 2006     Microscopic hematuria 2019    work up negative - Dr Redmond     MTHFR mutation      Mumps      Osteoarthrosis of knee 2014    Dr Augustine     Osteopenia      Prediabetes 2020     Pulmonary emboli (H) 2011    Post op     Pulmonary embolism and infarction (H) 10/05/2011     Problem list name updated by automated process. 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       atorvastatin (LIPITOR) 20 MG tablet Take 1 tablet (20 mg) by mouth daily 90 tablet 3     balsalazide (COLAZAL) 750 MG capsule Take 4 capsules (3,000 mg) by mouth 2 times daily       cholecalciferol (VITAMIN D3) 5000 UNITS CAPS capsule Take by mouth every other day       clobetasol (TEMOVATE) 0.05 % external cream Apply topically 2 times daily       multivitamin  peds with iron (FLINTSTONES COMPLETE) 60 MG chewable tablet Take 1 chew tab by mouth daily.       potassium chloride (KLOR-CON) 20 MEQ packet Take 40 mEq by mouth daily 60 packet 1     sertraline (ZOLOFT) 50 MG tablet Take 1 tablet (50 mg) by mouth daily 30 tablet 3       Allergies    No Known Allergies    Immunizations    Immunization History   Administered Date(s) Administered     COVID-19,PF,Pfizer (12+ Yrs) 12/16/2021, 01/21/2022     Zoster vaccine, live 07/25/2013       Family History    Family History   Problem Relation Age of Onset     Alcohol/Drug Father      Lipids Father         high cholesterol     Diabetes Paternal Grandmother      Diabetes Son         diet controlled     Lung Cancer Maternal Grandfather         smoker     Coronary Artery Disease Brother        Social History    Social History     Socioeconomic History     Marital status:      Spouse name: Anand     Number of children: 1     Years of education: 12     Highest education level: Not on file   Occupational History     Not on file   Tobacco Use     Smoking status: Never Smoker     Smokeless tobacco: Never Used     Tobacco comment: 30 years ago   Vaping Use     Vaping Use: Never used   Substance and Sexual Activity     Alcohol use: Yes     Alcohol/week: 3.0 - 4.0 standard drinks     Types: 3 - 4 Standard drinks or equivalent per week     Comment: 1-2 drinks about 2 times/week     Drug use: Never     Sexual activity: Not Currently     Partners: Male     Birth  "control/protection: None   Other Topics Concern     Parent/sibling w/ CABG, MI or angioplasty before 65F 55M? No      Service Not Asked     Blood Transfusions Not Asked     Caffeine Concern Yes     Comment: 2-4 cans per week     Occupational Exposure Not Asked     Hobby Hazards Not Asked     Sleep Concern Not Asked     Stress Concern Not Asked     Weight Concern Not Asked     Special Diet Not Asked     Back Care Not Asked     Exercise Yes     Comment: 6,000 to 9,000 steps daily     Bike Helmet Not Asked     Seat Belt Yes     Self-Exams Not Asked   Social History Narrative     Not on file     Social Determinants of Health     Financial Resource Strain: Not on file   Food Insecurity: Not on file   Transportation Needs: Not on file   Physical Activity: Not on file   Stress: Not on file   Social Connections: Not on file   Intimate Partner Violence: Not on file   Housing Stability: Not on file       ROS    CONSTITUTIONAL: NEGATIVE for fever, chills, change in weight  INTEGUMENTARY/SKIN: NEGATIVE for worrisome rashes, moles or lesions  EYES: NEGATIVE for vision changes or irritation  ENT/MOUTH: NEGATIVE for ear, mouth and throat problems  RESP: NEGATIVE for significant cough or SOB  BREAST: NEGATIVE for masses, tenderness or discharge  CV: NEGATIVE for chest pain, palpitations or peripheral edema  GI: NEGATIVE for nausea, abdominal pain, heartburn, or change in bowel habits  : NEGATIVE for frequency, dysuria, or hematuria  MUSCULOSKELETAL: NEGATIVE for significant arthralgias or myalgia  NEURO: NEGATIVE for weakness, dizziness or paresthesias  ENDOCRINE: NEGATIVE for temperature intolerance, skin/hair changes  HEME: NEGATIVE for bleeding problems  PSYCHIATRIC: NEGATIVE for changes in mood or affect    OBJECTIVE    BP (!) 154/94   Pulse 108   Temp 98.6  F (37  C)   Resp 16   Ht 1.461 m (4' 9.5\")   Wt 68 kg (150 lb)   LMP 06/03/2004   SpO2 95%   BMI 31.90 kg/m    EXAM:  GENERAL: healthy, alert and no " distress  EYES: Eyes grossly normal to inspection, PERRL and conjunctivae and sclerae normal  HENT: ear canals and TM's normal, nose and mouth without ulcers or lesions  NECK: no adenopathy, no asymmetry, masses, or scars and thyroid normal to palpation  RESP: lungs clear to auscultation - no rales, rhonchi or wheezes  BREAST: pt declines  CV: regular rate and rhythm, normal S1 S2, no S3 or S4, no murmur, click or rub, no peripheral edema and peripheral pulses strong  ABDOMEN: soft, nontender, no hepatosplenomegaly, no masses and bowel sounds normal   (female): pt declines  MS: no gross musculoskeletal defects noted, no edema  SKIN: no suspicious lesions or rashes  NEURO: Normal strength and tone, mentation intact and speech normal  PSYCH: mentation appears normal, affect normal/bright  LYMPH: no cervical, supraclavicular, axillary, or inguinal adenopathy    DIAGNOSTICS/PROCEDURES    Reviewed labs    ASSESSMENT      ICD-10-CM    1. Routine general medical examination at a health care facility  Z00.00    2. Prediabetes  R73.03    3. Hyperlipidemia LDL goal <100  E78.5 atorvastatin (LIPITOR) 20 MG tablet   4. Ulcerative rectosigmoiditis 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 (H)  K51.30    5. History of deep venous thrombosis  Z86.718    6. History of pulmonary embolism  Z86.711    7. MTHFR mutation  Z15.89    8. Subclinical hyperthyroidism  E05.90    9. Primary osteoarthritis involving multiple joints  M89.49    10. Osteopenia of multiple sites  M85.89    11. Polyp of colon, unspecified part of colon, unspecified type  K63.5    12. Screen for colon cancer  Z12.11    13. High priority for 2019-nCoV vaccine  Z23 COVID-19,PF,PFIZER (12+ Yrs GRAY LABEL)   14. Medication monitoring encounter  Z51.81    15. Methylenetetrahydrofolate reductase deficiency (H)  E72.12    16. Grief  F43.21 sertraline (ZOLOFT) 50 MG tablet   17. Hypokalemia  E87.6 potassium chloride (KLOR-CON) 20 MEQ packet     Basic metabolic panel  (Ca, Cl, CO2, Creat, Gluc,  "K, Na, BUN)       PLAN    Discussed treatment/modality options, including risk and benefits, she desires:    advised alcohol consumption 1oz per day or less, advised aspirin 81 mg po daily, advised 1 multivitamin per day, advised calcium 2266-8449 mg/d and Vitamin D 800-1200 IU/d, advised diet, exercise, and weight loss, advised opthalmologist every 1-2 years, advised self breast exam q month, immunization(s), medication refill(s), new medication(s), MICHAEL 7, completed and reviewed, PHQ-9, Depression Action Plan, completed and reviewed and observation    All diagnosis above reviewed and noted above, otherwise stable.      See [facility] orders for further details.      1) add potassium, declined IV/ER    2) add sertaline, consider counselor    3) meds refilled    4) labs reviewed, recheck labs 1/24    5) follow up with me 1 week - med check, bp recheck    6) colonoscopy in 8/2022    7) COVID booster given, consider further immunizations, declines at this time    Return in about 1 month (around 2/21/2022).    Health Maintenance Due   Topic Date Due     ZOSTER IMMUNIZATION (3 of 3) 03/17/2021       COUNSELING    Reviewed preventive health counseling, as reflected in patient instructions    BP Readings from Last 1 Encounters:   01/21/22 (!) 154/94     Estimated body mass index is 31.9 kg/m  as calculated from the following:    Height as of this encounter: 1.461 m (4' 9.5\").    Weight as of this encounter: 68 kg (150 lb).      Weight management plan: diet and exercise     reports that she has never smoked. She has never used smokeless tobacco.           Ken Gardiner MD, FAAFP     [facility]  [address] [place] [id number]  [email or web address]   Office: [phone]  Fax: [phone]  Pager: [phone]     Identified Health Risks:  "

## 2022-01-22 LAB
CREAT UR-MCNC: 288 MG/DL
MICROALBUMIN UR-MCNC: 81 MG/L
MICROALBUMIN/CREAT UR: 28.13 MG/G CR (ref 0–25)

## 2022-01-24 ENCOUNTER — LAB (OUTPATIENT)
Dept: LAB | Facility: CLINIC | Age: 72
End: 2022-01-24
Payer: COMMERCIAL

## 2022-01-24 DIAGNOSIS — E87.6 HYPOKALEMIA: ICD-10-CM

## 2022-01-24 PROCEDURE — 80048 BASIC METABOLIC PNL TOTAL CA: CPT

## 2022-01-24 PROCEDURE — 36415 COLL VENOUS BLD VENIPUNCTURE: CPT

## 2022-01-25 LAB
ANION GAP SERPL CALCULATED.3IONS-SCNC: 6 MMOL/L (ref 3–14)
BUN SERPL-MCNC: 10 MG/DL (ref 7–30)
CALCIUM SERPL-MCNC: 8.9 MG/DL (ref 8.5–10.1)
CHLORIDE BLD-SCNC: 109 MMOL/L (ref 94–109)
CO2 SERPL-SCNC: 27 MMOL/L (ref 20–32)
CREAT SERPL-MCNC: 0.69 MG/DL (ref 0.52–1.04)
GFR SERPL CREATININE-BSD FRML MDRD: >90 ML/MIN/1.73M2
GLUCOSE BLD-MCNC: 99 MG/DL (ref 70–99)
POTASSIUM BLD-SCNC: 3.6 MMOL/L (ref 3.4–5.3)
SODIUM SERPL-SCNC: 142 MMOL/L (ref 133–144)

## 2022-01-25 NOTE — TELEPHONE ENCOUNTER
Please see my chart message below     Please review and advise     Thank you     Katerina Kennedy RN, BSN  [place] [facility]

## 2022-01-25 NOTE — TELEPHONE ENCOUNTER
See note from 1/21, discussion from pharmacy    Potassium   Date Value Ref Range Status   01/19/2022 2.8 (L) 3.4 - 5.3 mmol/L Final   12/16/2020 3.5 3.4 - 5.3 mmol/L Final     Please get potassium result from 1/24

## 2022-01-26 DIAGNOSIS — E87.6 HYPOKALEMIA: Primary | ICD-10-CM

## 2022-01-26 RX ORDER — POTASSIUM CHLORIDE 1.5 G/1.58G
20 POWDER, FOR SOLUTION ORAL DAILY
Qty: 30 EACH | Refills: 1 | Status: SHIPPED | OUTPATIENT
Start: 2022-01-26 | End: 2022-01-26 | Stop reason: ALTCHOICE

## 2022-01-26 RX ORDER — POTASSIUM CHLORIDE 1500 MG/1
20 TABLET, EXTENDED RELEASE ORAL DAILY
Qty: 90 TABLET | Refills: 0 | Status: SHIPPED | OUTPATIENT
Start: 2022-01-26 | End: 2022-01-26

## 2022-01-26 RX ORDER — POTASSIUM CHLORIDE 1.5 G/1.58G
20 POWDER, FOR SOLUTION ORAL DAILY
Qty: 60 EACH | Refills: 2 | Status: SHIPPED | OUTPATIENT
Start: 2022-01-26 | End: 2022-03-16

## 2022-01-26 NOTE — TELEPHONE ENCOUNTER
Pt calling stating that she found a way to cover the packets - as she found a good rx coupon to help with the cost     Rn reordered the packets and canceled the tabs     Katerina Kennedy RN, BSN  [facility]

## 2022-01-26 NOTE — TELEPHONE ENCOUNTER
Potassium   Date Value Ref Range Status   01/24/2022 3.6 3.4 - 5.3 mmol/L Final   12/16/2020 3.5 3.4 - 5.3 mmol/L Final     Please see my chart message below     Please review and advise     Thank you     Katerina Kennedy RN, BSN  [place] [facility]

## 2022-01-26 NOTE — TELEPHONE ENCOUNTER
Please see my note from yesterday    Potassium   Date Value Ref Range Status   01/24/2022 3.6 3.4 - 5.3 mmol/L Final   12/16/2020 3.5 3.4 - 5.3 mmol/L Final     Was 2.8 on 1/19/2022    Called [facility] pharmacy     Pt paid cash for 6 days supply, recheck labs on 1/24     KCL  (Klor-con) tabs - will dissolve in water, they are covered, as needed - reviewed with pharmacy, may need new rx

## 2022-01-26 NOTE — TELEPHONE ENCOUNTER
My chart message sent     Katerina Kennedy RN, BSN  [facility]

## 2022-01-28 ENCOUNTER — OFFICE VISIT (OUTPATIENT)
Dept: FAMILY MEDICINE | Facility: CLINIC | Age: 72
End: 2022-01-28
Payer: COMMERCIAL

## 2022-01-28 VITALS
HEART RATE: 102 BPM | OXYGEN SATURATION: 96 % | DIASTOLIC BLOOD PRESSURE: 82 MMHG | TEMPERATURE: 97.5 F | RESPIRATION RATE: 16 BRPM | SYSTOLIC BLOOD PRESSURE: 136 MMHG | HEIGHT: 58 IN | WEIGHT: 149.3 LBS | BODY MASS INDEX: 31.34 KG/M2

## 2022-01-28 DIAGNOSIS — R73.03 PREDIABETES: ICD-10-CM

## 2022-01-28 DIAGNOSIS — E87.6 HYPOKALEMIA: Primary | ICD-10-CM

## 2022-01-28 DIAGNOSIS — Z15.89 MTHFR MUTATION: ICD-10-CM

## 2022-01-28 DIAGNOSIS — F43.21 GRIEF: ICD-10-CM

## 2022-01-28 DIAGNOSIS — M85.89 OSTEOPENIA OF MULTIPLE SITES: ICD-10-CM

## 2022-01-28 DIAGNOSIS — E05.90 SUBCLINICAL HYPERTHYROIDISM: ICD-10-CM

## 2022-01-28 DIAGNOSIS — M15.0 PRIMARY OSTEOARTHRITIS INVOLVING MULTIPLE JOINTS: ICD-10-CM

## 2022-01-28 DIAGNOSIS — K51.30 ULCERATIVE RECTOSIGMOIDITIS WITHOUT COMPLICATION (H): ICD-10-CM

## 2022-01-28 DIAGNOSIS — Z86.711 HISTORY OF PULMONARY EMBOLISM: ICD-10-CM

## 2022-01-28 DIAGNOSIS — E78.5 HYPERLIPIDEMIA LDL GOAL <100: ICD-10-CM

## 2022-01-28 DIAGNOSIS — Z86.718 HISTORY OF DEEP VENOUS THROMBOSIS: ICD-10-CM

## 2022-01-28 DIAGNOSIS — Z51.81 MEDICATION MONITORING ENCOUNTER: ICD-10-CM

## 2022-01-28 PROBLEM — D12.5 BENIGN NEOPLASM OF SIGMOID COLON: Status: ACTIVE | Noted: 2017-12-18

## 2022-01-28 PROBLEM — K57.30 DIVERTICULAR DISEASE OF LARGE INTESTINE: Status: ACTIVE | Noted: 2017-12-14

## 2022-01-28 PROBLEM — K64.9 HEMORRHOIDS: Status: ACTIVE | Noted: 2018-05-08

## 2022-01-28 PROBLEM — R19.7 DIARRHEA: Status: ACTIVE | Noted: 2017-11-21

## 2022-01-28 PROCEDURE — 99214 OFFICE O/P EST MOD 30 MIN: CPT | Performed by: FAMILY MEDICINE

## 2022-01-28 ASSESSMENT — MIFFLIN-ST. JEOR: SCORE: 1079.03

## 2022-01-28 NOTE — PROGRESS NOTES
Assessment & Plan       ICD-10-CM    1. Hypokalemia  E87.6 Basic metabolic panel  (Ca, Cl, CO2, Creat, Gluc, K, Na, BUN)   2. Grief  F43.21    3. Prediabetes  R73.03 Basic metabolic panel  (Ca, Cl, CO2, Creat, Gluc, K, Na, BUN)   4. Hyperlipidemia LDL goal <100  E78.5    5. Ulcerative rectosigmoiditis 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 (H)  K51.30    6. History of deep venous thrombosis  Z86.718    7. History of pulmonary embolism  Z86.711    8. MTHFR mutation  Z15.89    9. Subclinical hyperthyroidism  E05.90    10. Osteopenia of multiple sites  M85.89    11. Primary osteoarthritis involving multiple joints  M89.49    12. Medication monitoring encounter  Z51.81        Discussed treatment/modality options, including risk and benefits, she desires:    1) complete KCL 20  Meq packets, recheck BMP in 1 month    2) continue sertaline 50 mg daily, refills available    3) consider counseling    4) reviewed health directive    5) reviewed labs    All diagnosis above reviewed and noted above, otherwise stable.      See [facility] orders for further details.      Return in about 3 months (around 4/28/2022) for Medication Recheck Visit, Follow Up Chronic.    No LOS data to display    Doing chart review, history and exam, documentation and further activities as noted.           Ken Gardiner MD, FAAFP     [facility]  [address] [place] [id number]  [email or web address]   Office: [phone]  Fax: [phone]  Pager: [phone]       Subjective   Hannah is a 71 year old who presents for the following health issues     HPI     Follow up     Hypokalemia - lowest was 2.8 - on KCL upplement    Potassium   Date Value Ref Range Status   01/24/2022 3.6 3.4 - 5.3 mmol/L Final   12/16/2020 3.5 3.4 - 5.3 mmol/L Final     Grief - better with sertaline    Wt Readings from Last 4 Encounters:   01/28/22 67.7 kg (149 lb 4.8 oz)   01/21/22 68 kg (150 lb)   08/24/21  75.8 kg (167 lb)   08/16/21 74.8 kg (165 lb)     Prediabetes    Glucose   Date Value Ref Range Status   01/24/2022 99 70 - 99 mg/dL Final   01/19/2022 108 (H) 70 - 99 mg/dL Final   12/16/2020 104 (H) 70 - 99 mg/dL Final     Comment:     Fasting specimen   10/21/2020 111 (H) 70 - 99 mg/dL Final     Comment:     Fasting specimen   12/18/2019 108 (H) 70 - 99 mg/dL Final     Comment:     Fasting specimen   08/14/2019 104 (H) 70 - 99 mg/dL Final     Comment:     Fasting specimen   05/08/2019 120 (H) 70 - 99 mg/dL Final     Comment:     Fasting specimen     Lab Results   Component Value Date    A1C 5.7 01/19/2022    A1C 5.8 12/16/2020    A1C 5.7 12/18/2019    A1C 5.6 08/14/2019    A1C 5.7 10/31/2018    A1C 5.7 08/14/2015     Lipids    Recent Labs   Lab Test 01/19/22  0932 12/16/20  0917 10/04/16  0922 08/14/15  0913 07/30/14  0828   CHOL 147 129   < > 156 161   HDL 54 43*   < > 55 47*   LDL 63 66   < > 78 87   TRIG 151* 101   < > 116 137   CHOLHDLRATIO  --   --   --  2.8 3.4    < > = values in this interval not displayed.     UC - no issues    Hx of PE/DVT - MTHFR - no issues    Hypothyroid    TSH   Date Value Ref Range Status   01/19/2022 0.17 (L) 0.40 - 4.00 mU/L Final   05/04/2021 0.32 (L) 0.40 - 4.00 mU/L Final         How many servings of fruits and vegetables do you eat daily?  0-1    On average, how many sweetened beverages do you drink each day (Examples: soda, juice, sweet tea, etc.  Do NOT count diet or artificially sweetened beverages)?   0    How many days per week do you exercise enough to make your heart beat faster? 3 or less    How many minutes a day do you exercise enough to make your heart beat faster? 9 or less    How many days per week do you miss taking your medication? 0    Review of Systems   CONSTITUTIONAL: NEGATIVE for fever, chills, change in weight  INTEGUMENTARY/SKIN: NEGATIVE for worrisome rashes, moles or lesions  EYES: NEGATIVE for vision changes or irritation  ENT/MOUTH: NEGATIVE for ear,  "mouth and throat problems  RESP: NEGATIVE for significant cough or SOB  CV: NEGATIVE for chest pain, palpitations or peripheral edema  GI: NEGATIVE for nausea, abdominal pain, heartburn, or change in bowel habits  : NEGATIVE for frequency, dysuria, or hematuria  MUSCULOSKELETAL: NEGATIVE for significant arthralgias or myalgia  NEURO: NEGATIVE for weakness, dizziness or paresthesias  ENDOCRINE: NEGATIVE for temperature intolerance, skin/hair changes  HEME: NEGATIVE for bleeding problems  PSYCHIATRIC: NEGATIVE for changes in mood or affect      Objective    /82   Pulse 102   Temp 97.5  F (36.4  C) (Tympanic)   Resp 16   Ht 1.461 m (4' 9.5\")   Wt 67.7 kg (149 lb 4.8 oz)   LMP 06/03/2004   SpO2 96%   BMI 31.75 kg/m    Body mass index is 31.75 kg/m .  Physical Exam   GENERAL: healthy, alert and no distress  EYES: Eyes grossly normal to inspection, PERRL and conjunctivae and sclerae normal  HENT: ear canals and TM's normal, nose and mouth without ulcers or lesions  NECK: no adenopathy, no asymmetry, masses, or scars and thyroid normal to palpation  RESP: lungs clear to auscultation - no rales, rhonchi or wheezes  CV: regular rate and rhythm, normal S1 S2, no S3 or S4, no murmur, click or rub, no peripheral edema and peripheral pulses strong  ABDOMEN: soft, nontender, no hepatosplenomegaly, no masses and bowel sounds normal  MS: no gross musculoskeletal defects noted, no edema  SKIN: no suspicious lesions or rashes  NEURO: Normal strength and tone, mentation intact and speech normal  PSYCH: mentation appears normal, affect normal/bright    Reviewed recent labs        "

## 2022-02-17 PROBLEM — Z71.89 ADVANCED DIRECTIVES, COUNSELING/DISCUSSION: Status: ACTIVE | Noted: 2017-10-04

## 2022-02-28 ENCOUNTER — LAB (OUTPATIENT)
Dept: LAB | Facility: CLINIC | Age: 72
End: 2022-02-28
Payer: COMMERCIAL

## 2022-02-28 DIAGNOSIS — E87.6 HYPOKALEMIA: ICD-10-CM

## 2022-02-28 DIAGNOSIS — R73.03 PREDIABETES: ICD-10-CM

## 2022-02-28 LAB
ANION GAP SERPL CALCULATED.3IONS-SCNC: 3 MMOL/L (ref 3–14)
BUN SERPL-MCNC: 12 MG/DL (ref 7–30)
CALCIUM SERPL-MCNC: 8.9 MG/DL (ref 8.5–10.1)
CHLORIDE BLD-SCNC: 110 MMOL/L (ref 94–109)
CO2 SERPL-SCNC: 27 MMOL/L (ref 20–32)
CREAT SERPL-MCNC: 0.67 MG/DL (ref 0.52–1.04)
GFR SERPL CREATININE-BSD FRML MDRD: >90 ML/MIN/1.73M2
GLUCOSE BLD-MCNC: 106 MG/DL (ref 70–99)
POTASSIUM BLD-SCNC: 3.8 MMOL/L (ref 3.4–5.3)
SODIUM SERPL-SCNC: 140 MMOL/L (ref 133–144)

## 2022-02-28 PROCEDURE — 80048 BASIC METABOLIC PNL TOTAL CA: CPT

## 2022-02-28 PROCEDURE — 36415 COLL VENOUS BLD VENIPUNCTURE: CPT

## 2022-03-23 ENCOUNTER — LAB (OUTPATIENT)
Dept: LAB | Facility: CLINIC | Age: 72
End: 2022-03-23
Payer: COMMERCIAL

## 2022-03-23 DIAGNOSIS — E87.6 HYPOKALEMIA: ICD-10-CM

## 2022-03-23 LAB
ANION GAP SERPL CALCULATED.3IONS-SCNC: 8 MMOL/L (ref 3–14)
BUN SERPL-MCNC: 14 MG/DL (ref 7–30)
CALCIUM SERPL-MCNC: 10.1 MG/DL (ref 8.5–10.1)
CHLORIDE BLD-SCNC: 106 MMOL/L (ref 94–109)
CO2 SERPL-SCNC: 25 MMOL/L (ref 20–32)
CREAT SERPL-MCNC: 0.75 MG/DL (ref 0.52–1.04)
GFR SERPL CREATININE-BSD FRML MDRD: 85 ML/MIN/1.73M2
GLUCOSE BLD-MCNC: 106 MG/DL (ref 70–99)
POTASSIUM BLD-SCNC: 4 MMOL/L (ref 3.4–5.3)
SODIUM SERPL-SCNC: 139 MMOL/L (ref 133–144)

## 2022-03-23 PROCEDURE — 80048 BASIC METABOLIC PNL TOTAL CA: CPT

## 2022-03-23 PROCEDURE — 36415 COLL VENOUS BLD VENIPUNCTURE: CPT

## 2022-04-27 ENCOUNTER — MYC MEDICAL ADVICE (OUTPATIENT)
Dept: FAMILY MEDICINE | Facility: CLINIC | Age: 72
End: 2022-04-27
Payer: COMMERCIAL

## 2022-04-27 DIAGNOSIS — E87.6 HYPOKALEMIA: Primary | ICD-10-CM

## 2022-04-27 NOTE — TELEPHONE ENCOUNTER
Potassium   Date Value Ref Range Status   03/23/2022 4.0 3.4 - 5.3 mmol/L Final   12/16/2020 3.5 3.4 - 5.3 mmol/L Final     Please see my chart message below     Please review and advise     Thank you     Katerina Kennedy RN, BSN  [place] [facility]

## 2022-04-29 NOTE — TELEPHONE ENCOUNTER
See mychart, advise continue potassium (please refill), and order BMP, please review with patient

## 2022-05-02 NOTE — TELEPHONE ENCOUNTER
[employer] message sent    BNP  Order placed per Dr. Gardiner.   Potassium does not need to reordered yet.   potassium chloride (KLOR-CON) 20 MEQ packet 60 each 2 3/16     Kaylee CHOI RN   [facility]

## 2022-05-11 ENCOUNTER — LAB (OUTPATIENT)
Dept: LAB | Facility: CLINIC | Age: 72
End: 2022-05-11
Payer: COMMERCIAL

## 2022-05-11 DIAGNOSIS — E87.6 HYPOKALEMIA: ICD-10-CM

## 2022-05-11 LAB
ANION GAP SERPL CALCULATED.3IONS-SCNC: 7 MMOL/L (ref 3–14)
BUN SERPL-MCNC: 15 MG/DL (ref 7–30)
CALCIUM SERPL-MCNC: 9.1 MG/DL (ref 8.5–10.1)
CHLORIDE BLD-SCNC: 107 MMOL/L (ref 94–109)
CO2 SERPL-SCNC: 26 MMOL/L (ref 20–32)
CREAT SERPL-MCNC: 0.63 MG/DL (ref 0.52–1.04)
GFR SERPL CREATININE-BSD FRML MDRD: >90 ML/MIN/1.73M2
GLUCOSE BLD-MCNC: 115 MG/DL (ref 70–99)
POTASSIUM BLD-SCNC: 3.6 MMOL/L (ref 3.4–5.3)
SODIUM SERPL-SCNC: 140 MMOL/L (ref 133–144)

## 2022-05-11 PROCEDURE — 80048 BASIC METABOLIC PNL TOTAL CA: CPT

## 2022-05-11 PROCEDURE — 36415 COLL VENOUS BLD VENIPUNCTURE: CPT

## 2022-09-30 ENCOUNTER — MYC MEDICAL ADVICE (OUTPATIENT)
Dept: FAMILY MEDICINE | Facility: CLINIC | Age: 72
End: 2022-09-30

## 2022-09-30 DIAGNOSIS — E87.6 HYPOKALEMIA: ICD-10-CM

## 2022-10-03 RX ORDER — POTASSIUM CHLORIDE 1.5 G/1.58G
20 POWDER, FOR SOLUTION ORAL DAILY
Qty: 60 EACH | Refills: 2 | Status: CANCELLED | OUTPATIENT
Start: 2022-10-03

## 2022-10-03 NOTE — TELEPHONE ENCOUNTER
Routing refill request to provider for review/approval because:  A break in medication    Last filled 3/16/22 30 day and 2 RF    Agnieszka Blanchard RN

## 2022-10-04 NOTE — TELEPHONE ENCOUNTER
Has patient been taking on a consistent basis?   Concern regarding a break in medication.      If not - let's plan a recheck of BMP and determine continued need for potassium supplement.      - Coco, CNP

## 2022-10-06 NOTE — TELEPHONE ENCOUNTER
Called and spoke with patient.     Has not missed more than a dose or two. Has been taking it continuously.     She takes it daily so 60 doses is for 2 months at a time.   Last received refill on  - 2 boxes of 60 doses.   States this is expensive for her. Does she need to continue?  Last time it was $274 for 60 doses.     States her   in 2021 and wasn't eating well. Has been eating better now.     Routing to provider to review and advise.     Bere Moreno RN  [facility]

## 2022-10-07 NOTE — TELEPHONE ENCOUNTER
Ok to hold potassium and recheck labs in 2 weeks - BMP    BP Readings from Last 3 Encounters:   01/28/22 136/82   01/21/22 (!) 154/94   08/24/21 (!) 155/96     Creatinine   Date Value Ref Range Status   05/11/2022 0.63 0.52 - 1.04 mg/dL Final   12/16/2020 0.61 0.52 - 1.04 mg/dL Final     GFR Estimate   Date Value Ref Range Status   05/11/2022 >90 >60 mL/min/1.73m2 Final     Comment:     Effective December 21, 2021 eGFRcr in adults is calculated using the 2021 CKD-EPI creatinine equation which includes age and gender ([place] et al., NEJM, DOI: 10.1056/EYEAnp8829288)   12/16/2020 >90 >60 mL/min/[1.73_m2] Final     Comment:     Non  GFR Calc  Starting 12/18/2018, serum creatinine based estimated GFR (eGFR) will be   calculated using the Chronic Kidney Disease Epidemiology Collaboration   (CKD-EPI) equation.

## 2022-10-07 NOTE — TELEPHONE ENCOUNTER
This was addressed for Dr. Gardiner while he was out of clinic.     I would be ok with patient stopping and then planning to recheck BMP in 1 month after discontinuing potassium supplement.      Will forward to Dr. Gardiner to confirm his plan for this patient as he is PCP.      - JMeixl, CNP

## 2022-10-18 NOTE — TELEPHONE ENCOUNTER
Called and spoke with patient.     Advised of notes below. Scheduled lab for 10/21. Last dose of potassium was the weekend of the October 8th/9th.     WALTER BENDER RN on 10/18/2022 at 12:58 PM   [facility]

## 2022-10-21 ENCOUNTER — LAB (OUTPATIENT)
Dept: LAB | Facility: CLINIC | Age: 72
End: 2022-10-21
Payer: COMMERCIAL

## 2022-10-21 DIAGNOSIS — E87.6 HYPOKALEMIA: ICD-10-CM

## 2022-10-21 LAB
ANION GAP SERPL CALCULATED.3IONS-SCNC: 7 MMOL/L (ref 3–14)
BUN SERPL-MCNC: 13 MG/DL (ref 7–30)
CALCIUM SERPL-MCNC: 9 MG/DL (ref 8.5–10.1)
CHLORIDE BLD-SCNC: 108 MMOL/L (ref 94–109)
CO2 SERPL-SCNC: 25 MMOL/L (ref 20–32)
CREAT SERPL-MCNC: 0.6 MG/DL (ref 0.52–1.04)
GFR SERPL CREATININE-BSD FRML MDRD: >90 ML/MIN/1.73M2
GLUCOSE BLD-MCNC: 105 MG/DL (ref 70–99)
POTASSIUM BLD-SCNC: 3.4 MMOL/L (ref 3.4–5.3)
SODIUM SERPL-SCNC: 140 MMOL/L (ref 133–144)

## 2022-10-21 PROCEDURE — 80048 BASIC METABOLIC PNL TOTAL CA: CPT

## 2022-10-21 PROCEDURE — 36415 COLL VENOUS BLD VENIPUNCTURE: CPT

## 2022-10-22 ENCOUNTER — HEALTH MAINTENANCE LETTER (OUTPATIENT)
Age: 72
End: 2022-10-22

## 2022-11-18 ENCOUNTER — HOSPITAL ENCOUNTER (OUTPATIENT)
Dept: MAMMOGRAPHY | Facility: CLINIC | Age: 72
Discharge: HOME | End: 2022-11-18
Attending: FAMILY MEDICINE | Admitting: FAMILY MEDICINE
Payer: COMMERCIAL

## 2022-11-18 DIAGNOSIS — Z12.31 VISIT FOR SCREENING MAMMOGRAM: ICD-10-CM

## 2022-11-18 PROCEDURE — 77067 SCR MAMMO BI INCL CAD: CPT

## 2022-12-19 ENCOUNTER — TRANSFERRED RECORDS (OUTPATIENT)
Dept: HEALTH INFORMATION MANAGEMENT | Facility: CLINIC | Age: 72
End: 2022-12-19

## 2022-12-19 LAB
ALT SERPL-CCNC: 10 IU/L (ref 0–32)
AST SERPL-CCNC: 17 IU/L (ref 0–40)
CREATININE (EXTERNAL): 0.56 MG/DL (ref 0.57–1)
GFR ESTIMATED (EXTERNAL): 98 ML/MIN/1.73

## 2022-12-21 ENCOUNTER — MYC MEDICAL ADVICE (OUTPATIENT)
Dept: FAMILY MEDICINE | Facility: CLINIC | Age: 72
End: 2022-12-21

## 2022-12-21 DIAGNOSIS — M15.0 PRIMARY OSTEOARTHRITIS INVOLVING MULTIPLE JOINTS: ICD-10-CM

## 2022-12-21 DIAGNOSIS — Z15.89 MTHFR MUTATION: ICD-10-CM

## 2022-12-21 DIAGNOSIS — D12.5 BENIGN NEOPLASM OF SIGMOID COLON: ICD-10-CM

## 2022-12-21 DIAGNOSIS — R73.03 PREDIABETES: ICD-10-CM

## 2022-12-21 DIAGNOSIS — K51.30 ULCERATIVE RECTOSIGMOIDITIS WITHOUT COMPLICATION (H): ICD-10-CM

## 2022-12-21 DIAGNOSIS — Z86.718 HISTORY OF DEEP VENOUS THROMBOSIS: ICD-10-CM

## 2022-12-21 DIAGNOSIS — K63.5 POLYP OF COLON, UNSPECIFIED PART OF COLON, UNSPECIFIED TYPE: ICD-10-CM

## 2022-12-21 DIAGNOSIS — Z00.00 ROUTINE GENERAL MEDICAL EXAMINATION AT A HEALTH CARE FACILITY: Primary | ICD-10-CM

## 2022-12-21 DIAGNOSIS — E05.90 SUBCLINICAL HYPERTHYROIDISM: ICD-10-CM

## 2022-12-21 DIAGNOSIS — Z12.11 SCREEN FOR COLON CANCER: ICD-10-CM

## 2022-12-21 DIAGNOSIS — E78.5 HYPERLIPIDEMIA LDL GOAL <100: ICD-10-CM

## 2022-12-21 DIAGNOSIS — Z51.81 MEDICATION MONITORING ENCOUNTER: ICD-10-CM

## 2022-12-21 DIAGNOSIS — Z86.711 HISTORY OF PULMONARY EMBOLISM: ICD-10-CM

## 2022-12-21 DIAGNOSIS — M85.89 OSTEOPENIA OF MULTIPLE SITES: ICD-10-CM

## 2022-12-21 NOTE — TELEPHONE ENCOUNTER
Review MyChart and advise. Patient requesting to have labs drawn ahead of appt.     Cole Doyle RN [facility]

## 2023-01-16 ASSESSMENT — ENCOUNTER SYMPTOMS
DYSURIA: 0
ABDOMINAL PAIN: 0
SORE THROAT: 0
HEMATOCHEZIA: 0
WEAKNESS: 0
HEARTBURN: 0
MYALGIAS: 0
FREQUENCY: 0
JOINT SWELLING: 0
ARTHRALGIAS: 0
EYE PAIN: 0
PARESTHESIAS: 0
DIARRHEA: 0
HEMATURIA: 0
BREAST MASS: 0
SHORTNESS OF BREATH: 0
NAUSEA: 0
COUGH: 0
HEADACHES: 0
FEVER: 0
CONSTIPATION: 0
DIZZINESS: 0
PALPITATIONS: 0
NERVOUS/ANXIOUS: 0
CHILLS: 0

## 2023-01-16 ASSESSMENT — ACTIVITIES OF DAILY LIVING (ADL): CURRENT_FUNCTION: NO ASSISTANCE NEEDED

## 2023-01-19 ENCOUNTER — LAB (OUTPATIENT)
Dept: LAB | Facility: CLINIC | Age: 73
End: 2023-01-19
Payer: COMMERCIAL

## 2023-01-19 DIAGNOSIS — K63.5 POLYP OF COLON, UNSPECIFIED PART OF COLON, UNSPECIFIED TYPE: ICD-10-CM

## 2023-01-19 DIAGNOSIS — Z12.11 SCREEN FOR COLON CANCER: ICD-10-CM

## 2023-01-19 DIAGNOSIS — M85.89 OSTEOPENIA OF MULTIPLE SITES: ICD-10-CM

## 2023-01-19 DIAGNOSIS — K51.30 ULCERATIVE RECTOSIGMOIDITIS WITHOUT COMPLICATION (H): ICD-10-CM

## 2023-01-19 DIAGNOSIS — R73.03 PREDIABETES: ICD-10-CM

## 2023-01-19 DIAGNOSIS — Z86.711 HISTORY OF PULMONARY EMBOLISM: ICD-10-CM

## 2023-01-19 DIAGNOSIS — E78.5 HYPERLIPIDEMIA LDL GOAL <100: Primary | ICD-10-CM

## 2023-01-19 DIAGNOSIS — Z86.718 HISTORY OF DEEP VENOUS THROMBOSIS: ICD-10-CM

## 2023-01-19 DIAGNOSIS — E05.90 SUBCLINICAL HYPERTHYROIDISM: ICD-10-CM

## 2023-01-19 DIAGNOSIS — D12.5 BENIGN NEOPLASM OF SIGMOID COLON: ICD-10-CM

## 2023-01-19 DIAGNOSIS — Z15.89 MTHFR MUTATION: ICD-10-CM

## 2023-01-19 DIAGNOSIS — Z51.81 MEDICATION MONITORING ENCOUNTER: ICD-10-CM

## 2023-01-19 DIAGNOSIS — Z00.00 ROUTINE GENERAL MEDICAL EXAMINATION AT A HEALTH CARE FACILITY: ICD-10-CM

## 2023-01-19 LAB
ALBUMIN SERPL BCG-MCNC: 3.9 G/DL (ref 3.5–5.2)
ALP SERPL-CCNC: 123 U/L (ref 35–104)
ALT SERPL W P-5'-P-CCNC: 7 U/L (ref 10–35)
ANION GAP SERPL CALCULATED.3IONS-SCNC: 13 MMOL/L (ref 7–15)
AST SERPL W P-5'-P-CCNC: 29 U/L (ref 10–35)
BILIRUB SERPL-MCNC: 0.3 MG/DL
BUN SERPL-MCNC: 11.1 MG/DL (ref 8–23)
CALCIUM SERPL-MCNC: 9.3 MG/DL (ref 8.8–10.2)
CHLORIDE SERPL-SCNC: 105 MMOL/L (ref 98–107)
CHOLEST SERPL-MCNC: 133 MG/DL
CK SERPL-CCNC: 25 U/L (ref 26–192)
CREAT SERPL-MCNC: 0.66 MG/DL (ref 0.51–0.95)
CRP SERPL-MCNC: 14.1 MG/L
DEPRECATED HCO3 PLAS-SCNC: 23 MMOL/L (ref 22–29)
ERYTHROCYTE [DISTWIDTH] IN BLOOD BY AUTOMATED COUNT: 13.5 % (ref 10–15)
ERYTHROCYTE [SEDIMENTATION RATE] IN BLOOD BY WESTERGREN METHOD: 18 MM/HR (ref 0–30)
GFR SERPL CREATININE-BSD FRML MDRD: >90 ML/MIN/1.73M2
GLUCOSE SERPL-MCNC: 105 MG/DL (ref 70–99)
HBA1C MFR BLD: 5.7 % (ref 0–5.6)
HCT VFR BLD AUTO: 41.5 % (ref 35–47)
HDLC SERPL-MCNC: 33 MG/DL
HGB BLD-MCNC: 13.6 G/DL (ref 11.7–15.7)
LDLC SERPL CALC-MCNC: 72 MG/DL
MCH RBC QN AUTO: 27.3 PG (ref 26.5–33)
MCHC RBC AUTO-ENTMCNC: 32.8 G/DL (ref 31.5–36.5)
MCV RBC AUTO: 83 FL (ref 78–100)
NONHDLC SERPL-MCNC: 100 MG/DL
PLATELET # BLD AUTO: 201 10E3/UL (ref 150–450)
POTASSIUM SERPL-SCNC: 3.6 MMOL/L (ref 3.4–5.3)
PROT SERPL-MCNC: 7.3 G/DL (ref 6.4–8.3)
RBC # BLD AUTO: 4.99 10E6/UL (ref 3.8–5.2)
SODIUM SERPL-SCNC: 141 MMOL/L (ref 136–145)
T4 FREE SERPL-MCNC: 1.06 NG/DL (ref 0.9–1.7)
TRIGL SERPL-MCNC: 138 MG/DL
TSH SERPL DL<=0.005 MIU/L-ACNC: 0.37 UIU/ML (ref 0.3–4.2)
WBC # BLD AUTO: 3.6 10E3/UL (ref 4–11)

## 2023-01-19 PROCEDURE — 80053 COMPREHEN METABOLIC PANEL: CPT

## 2023-01-19 PROCEDURE — 82550 ASSAY OF CK (CPK): CPT

## 2023-01-19 PROCEDURE — 84443 ASSAY THYROID STIM HORMONE: CPT

## 2023-01-19 PROCEDURE — 82306 VITAMIN D 25 HYDROXY: CPT

## 2023-01-19 PROCEDURE — 36415 COLL VENOUS BLD VENIPUNCTURE: CPT

## 2023-01-19 PROCEDURE — 85027 COMPLETE CBC AUTOMATED: CPT

## 2023-01-19 PROCEDURE — 83036 HEMOGLOBIN GLYCOSYLATED A1C: CPT

## 2023-01-19 PROCEDURE — 84439 ASSAY OF FREE THYROXINE: CPT

## 2023-01-19 PROCEDURE — 80061 LIPID PANEL: CPT

## 2023-01-19 PROCEDURE — 85652 RBC SED RATE AUTOMATED: CPT

## 2023-01-19 PROCEDURE — 86140 C-REACTIVE PROTEIN: CPT

## 2023-01-20 LAB — DEPRECATED CALCIDIOL+CALCIFEROL SERPL-MC: 60 UG/L (ref 20–75)

## 2023-01-23 ENCOUNTER — LAB (OUTPATIENT)
Dept: LAB | Facility: CLINIC | Age: 73
End: 2023-01-23
Payer: COMMERCIAL

## 2023-01-23 ENCOUNTER — OFFICE VISIT (OUTPATIENT)
Dept: FAMILY MEDICINE | Facility: CLINIC | Age: 73
End: 2023-01-23
Payer: COMMERCIAL

## 2023-01-23 VITALS
DIASTOLIC BLOOD PRESSURE: 84 MMHG | SYSTOLIC BLOOD PRESSURE: 134 MMHG | RESPIRATION RATE: 16 BRPM | HEIGHT: 58 IN | BODY MASS INDEX: 31.97 KG/M2 | OXYGEN SATURATION: 96 % | WEIGHT: 152.3 LBS | TEMPERATURE: 98.3 F | HEART RATE: 84 BPM

## 2023-01-23 DIAGNOSIS — Z51.81 MEDICATION MONITORING ENCOUNTER: ICD-10-CM

## 2023-01-23 DIAGNOSIS — E72.12 METHYLENETETRAHYDROFOLATE REDUCTASE DEFICIENCY (H): ICD-10-CM

## 2023-01-23 DIAGNOSIS — Z15.89 MTHFR MUTATION: ICD-10-CM

## 2023-01-23 DIAGNOSIS — Z12.11 SCREEN FOR COLON CANCER: ICD-10-CM

## 2023-01-23 DIAGNOSIS — Z86.718 HISTORY OF DEEP VENOUS THROMBOSIS: ICD-10-CM

## 2023-01-23 DIAGNOSIS — R73.03 PREDIABETES: ICD-10-CM

## 2023-01-23 DIAGNOSIS — E78.5 HYPERLIPIDEMIA LDL GOAL <100: ICD-10-CM

## 2023-01-23 DIAGNOSIS — Z12.31 ENCOUNTER FOR SCREENING MAMMOGRAM FOR MALIGNANT NEOPLASM OF BREAST: ICD-10-CM

## 2023-01-23 DIAGNOSIS — Z00.00 ROUTINE GENERAL MEDICAL EXAMINATION AT A HEALTH CARE FACILITY: ICD-10-CM

## 2023-01-23 DIAGNOSIS — E05.90 SUBCLINICAL HYPERTHYROIDISM: ICD-10-CM

## 2023-01-23 DIAGNOSIS — M85.89 OSTEOPENIA OF MULTIPLE SITES: ICD-10-CM

## 2023-01-23 DIAGNOSIS — K51.30 ULCERATIVE RECTOSIGMOIDITIS WITHOUT COMPLICATION (H): ICD-10-CM

## 2023-01-23 DIAGNOSIS — Z13.820 SCREENING FOR OSTEOPOROSIS: ICD-10-CM

## 2023-01-23 DIAGNOSIS — E66.811 CLASS 1 OBESITY WITH SERIOUS COMORBIDITY AND BODY MASS INDEX (BMI) OF 32.0 TO 32.9 IN ADULT, UNSPECIFIED OBESITY TYPE: ICD-10-CM

## 2023-01-23 DIAGNOSIS — Z00.00 MEDICARE ANNUAL WELLNESS VISIT, SUBSEQUENT: ICD-10-CM

## 2023-01-23 DIAGNOSIS — Z86.711 HISTORY OF PULMONARY EMBOLISM: ICD-10-CM

## 2023-01-23 DIAGNOSIS — Z00.00 ROUTINE GENERAL MEDICAL EXAMINATION AT A HEALTH CARE FACILITY: Primary | ICD-10-CM

## 2023-01-23 DIAGNOSIS — K63.5 POLYP OF COLON, UNSPECIFIED PART OF COLON, UNSPECIFIED TYPE: ICD-10-CM

## 2023-01-23 DIAGNOSIS — M15.0 PRIMARY OSTEOARTHRITIS INVOLVING MULTIPLE JOINTS: ICD-10-CM

## 2023-01-23 LAB
ALBUMIN UR-MCNC: NEGATIVE MG/DL
APPEARANCE UR: CLEAR
BACTERIA #/AREA URNS HPF: ABNORMAL /HPF
BILIRUB UR QL STRIP: NEGATIVE
COLOR UR AUTO: YELLOW
CREAT UR-MCNC: 28 MG/DL
GLUCOSE UR STRIP-MCNC: NEGATIVE MG/DL
HGB UR QL STRIP: ABNORMAL
KETONES UR STRIP-MCNC: NEGATIVE MG/DL
LEUKOCYTE ESTERASE UR QL STRIP: NEGATIVE
MICROALBUMIN UR-MCNC: <12 MG/L
MICROALBUMIN/CREAT UR: NORMAL MG/G{CREAT}
NITRATE UR QL: NEGATIVE
PH UR STRIP: 7 [PH] (ref 5–7)
RBC #/AREA URNS AUTO: ABNORMAL /HPF
SP GR UR STRIP: 1.02 (ref 1–1.03)
UROBILINOGEN UR STRIP-ACNC: 0.2 E.U./DL
WBC #/AREA URNS AUTO: ABNORMAL /HPF

## 2023-01-23 PROCEDURE — 82570 ASSAY OF URINE CREATININE: CPT

## 2023-01-23 PROCEDURE — 99214 OFFICE O/P EST MOD 30 MIN: CPT | Mod: 25 | Performed by: FAMILY MEDICINE

## 2023-01-23 PROCEDURE — 81001 URINALYSIS AUTO W/SCOPE: CPT

## 2023-01-23 PROCEDURE — 82043 UR ALBUMIN QUANTITATIVE: CPT

## 2023-01-23 PROCEDURE — G0439 PPPS, SUBSEQ VISIT: HCPCS | Performed by: FAMILY MEDICINE

## 2023-01-23 RX ORDER — ATORVASTATIN CALCIUM 20 MG/1
20 TABLET, FILM COATED ORAL DAILY
Qty: 90 TABLET | Refills: 3 | Status: SHIPPED | OUTPATIENT
Start: 2023-01-23 | End: 2024-01-09

## 2023-01-23 ASSESSMENT — ENCOUNTER SYMPTOMS
CONSTIPATION: 0
DIARRHEA: 0
FEVER: 0
ARTHRALGIAS: 0
HEARTBURN: 0
CHILLS: 0
WEAKNESS: 0
ABDOMINAL PAIN: 0
SORE THROAT: 0
EYE PAIN: 0
HEADACHES: 0
BREAST MASS: 0
PARESTHESIAS: 0
HEMATURIA: 0
HEMATOCHEZIA: 0
FREQUENCY: 0
NERVOUS/ANXIOUS: 0
JOINT SWELLING: 0
COUGH: 0
DIZZINESS: 0
SHORTNESS OF BREATH: 0
MYALGIAS: 0
NAUSEA: 0
DYSURIA: 0
PALPITATIONS: 0

## 2023-01-23 ASSESSMENT — ACTIVITIES OF DAILY LIVING (ADL): CURRENT_FUNCTION: NO ASSISTANCE NEEDED

## 2023-01-23 NOTE — PATIENT INSTRUCTIONS
Patient Education   Personalized Prevention Plan  You are due for the preventive services outlined below.  Your care team is available to assist you in scheduling these services.  If you have already completed any of these items, please share that information with your care team to update in your medical record.  Health Maintenance Due   Topic Date Due     Zoster (Shingles) Vaccine (2 of 2) 03/17/2021     COVID-19 Vaccine (3 - Pfizer risk series) 02/18/2022     Flu Vaccine (1) 09/01/2022     ANNUAL REVIEW OF HM ORDERS  01/21/2023     Colorectal Cancer Screening  12/14/2022     Annual Wellness Visit  01/21/2023

## 2023-01-23 NOTE — PROGRESS NOTES
"Hannah is a 72 year old who presents for Preventive Visit.         Patient has been advised of split billing requirements and indicates understanding: Yes     Are you in the first 12 months of your Medicare coverage?  No    Healthy Habits:     In general, how would you rate your overall health?  Good    Frequency of exercise:  None    Do you usually eat at least 4 servings of fruit and vegetables a day, include whole grains    & fiber and avoid regularly eating high fat or \"junk\" foods?  No    Taking medications regularly:  Yes    Medication side effects:  None    Ability to successfully perform activities of daily living:  No assistance needed    Home Safety:  No safety concerns identified    Hearing Impairment:  No hearing concerns    In the past 6 months, have you been bothered by leaking of urine?  No    In general, how would you rate your overall mental or emotional health?  Good      PHQ-2 Total Score: 0    Additional concerns today:  No    Have you ever done Advance Care Planning? (For example, a Health Directive, POLST, or a discussion with a medical provider or your loved ones about your wishes): Yes, advance care planning is on file.     Fall risk  Fallen 2 or more times in the past year?: No  Any fall with injury in the past year?: No    Cognitive Screening   1) Repeat 3 items (Leader, Season, Table)    2) Clock draw: NORMAL  3) 3 item recall: Recalls 2 objects   Results: NORMAL clock, 1-2 items recalled: COGNITIVE IMPAIRMENT LESS LIKELY    Mini-CogTM Copyright S Mario. Licensed by the author for use in [facility]; reprinted with permission ([email or web address]). All rights reserved.      Do you have sleep apnea, excessive snoring or daytime drowsiness?: no    Reviewed and updated as needed this visit by clinical staff   Tobacco  Allergies  Meds  Problems  Med Hx  Surg Hx  Fam Hx          Reviewed and updated as needed this visit by Provider                 Social History     Tobacco Use     " Smoking status: Never     Smokeless tobacco: Never     Tobacco comments:     30 years ago   Substance Use Topics     Alcohol use: Yes     Alcohol/week: 3.0 - 4.0 standard drinks     Types: 3 - 4 Standard drinks or equivalent per week     Comment: 1-2 drinks about 2 times/week     If you drink alcohol do you typically have >3 drinks per day or >7 drinks per week? No    No flowsheet data found.    Current providers sharing in care for this patient include:     Patient Care Team:  Ken Gardiner MD as PCP - General (Family Practice)  Ken Gardiner MD as Assigned PCP  Vesna Egan RD as Diabetes Educator (Dietitian, Registered)  Rony Everett MD as Assigned Endocrinology Provider  Rony Everett MD as MD (Endocrinology, Diabetes, and Metabolism)  Christina Jones PA-C as Physician Assistant (Dermatology)  Ken Gardiner MD as Referring Physician (Family Medicine)    The following health maintenance items are reviewed in Epic and correct as of today:  Health Maintenance   Topic Date Due     ZOSTER IMMUNIZATION (2 of 2) 03/17/2021     COVID-19 Vaccine (3 - Pfizer risk series) 02/18/2022     INFLUENZA VACCINE (1) 09/01/2022     COLORECTAL CANCER SCREENING  12/14/2022     DEXA  07/08/2023     MAMMO SCREENING  11/18/2023     ALT  01/19/2024     LIPID  01/19/2024     MEDICARE ANNUAL WELLNESS VISIT  01/23/2024     ANNUAL REVIEW OF HM ORDERS  01/23/2024     FALL RISK ASSESSMENT  01/23/2024     ADVANCE CARE PLANNING  01/23/2028     DTAP/TDAP/TD IMMUNIZATION (2 - Td or Tdap) 01/20/2031     HEPATITIS C SCREENING  Completed     PHQ-2 (once per calendar year)  Completed     Pneumococcal Vaccine: 65+ Years  Addressed     IPV IMMUNIZATION  Aged Out     MENINGITIS IMMUNIZATION  Aged Out     Review of Systems   Constitutional: Negative for chills and fever.   HENT: Negative for congestion, ear pain, hearing loss and sore throat.    Eyes: Negative for pain and visual disturbance.   Respiratory: Negative for cough and  shortness of breath.    Cardiovascular: Negative for chest pain, palpitations and peripheral edema.   Gastrointestinal: Negative for abdominal pain, constipation, diarrhea, heartburn, hematochezia and nausea.   Breasts:  Negative for tenderness, breast mass and discharge.   Genitourinary: Negative for dysuria, frequency, genital sores, hematuria, pelvic pain, urgency, vaginal bleeding and vaginal discharge.   Musculoskeletal: Negative for arthralgias, joint swelling and myalgias.   Skin: Negative for rash.   Neurological: Negative for dizziness, weakness, headaches and paresthesias.   Psychiatric/Behavioral: Negative for mood changes. The patient is not nervous/anxious.      UC - controlled with meds - follows with Dr Antonio - [facility] - appt 2/16    Prediabetes    Lab Results   Component Value Date    A1C 5.7 01/19/2023    A1C 5.7 01/19/2022    A1C 5.8 12/16/2020    A1C 5.7 12/18/2019    A1C 5.6 08/14/2019    A1C 5.7 10/31/2018    A1C 5.7 08/14/2015     Hx of PE/DVT - MTHFR - on asa 81 - off AC since 2012    Hypothyroid    TSH   Date Value Ref Range Status   01/19/2023 0.37 0.30 - 4.20 uIU/mL Final   01/19/2022 0.17 (L) 0.40 - 4.00 mU/L Final   05/04/2021 0.32 (L) 0.40 - 4.00 mU/L Final     Lipids    Recent Labs   Lab Test 01/19/23  0937 01/19/22  0932 10/04/16  0922 08/14/15  0913   CHOL 133 147   < > 156   HDL 33* 54   < > 55   LDL 72 63   < > 78   TRIG 138 151*   < > 116   CHOLHDLRATIO  --   --   --  2.8    < > = values in this interval not displayed.     OA / Osteopenia - no issues    Health Maintenance     Colonoscopy:  Last colonoscopy - 12/2017 - due - Dr Antonio   FIT:  NA              Pap: NA              Mammo: due 11/2023   DEXA:  Due 7/2023    Health Maintenance Due   Topic Date Due     ZOSTER IMMUNIZATION (2 of 2) 03/17/2021     COVID-19 Vaccine (3 - Pfizer risk series) 02/18/2022     INFLUENZA VACCINE (1) 09/01/2022     COLORECTAL CANCER SCREENING  12/14/2022       Current Problem List    Patient Active  Problem List   Diagnosis     Chondromalacia of patella     Hyperlipidemia LDL goal <100     S/P shoulder surgery     Advanced directives, counseling/discussion     Rotator cuff (capsule) sprain     Osteopenia     Carpal tunnel syndrome     Hyperglycemia     Subclinical hyperthyroidism     Osteoarthrosis of knee     History of pulmonary embolism     Primary osteoarthritis of right knee     MTHFR mutation     History of deep venous thrombosis     Elevated serum protein level     Ulcerative rectosigmoiditis 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 (H)     Microscopic hematuria     Colon polyps     Primary osteoarthritis involving multiple joints     Class 1 obesity with serious comorbidity and body mass index (BMI) of 32.0 to 32.9 in adult, unspecified obesity type     Prediabetes     Methylenetetrahydrofolate reductase deficiency (H)     Abnormal blood chemistry level     Abnormal liver function tests     Benign neoplasm of sigmoid colon     Collagenous colitis     Diarrhea     Diverticular disease of colon     Diverticular disease of large intestine     Hemorrhoids     Noninfectious gastroenteritis       Past Medical History    Past Medical History:   Diagnosis Date     Colon polyps 12/2017    tubular adenoma x 1 - 3mm - due 5 yrs     DVT (deep venous thrombosis) (H) 2011    PE - postop     Elevated serum protein level     Protein S     History of thrombophlebitis      Hyperlipidemia LDL goal <100 2006     Microscopic hematuria 2019    work up negative - Dr Redmond     MTHFR mutation      Mumps      Osteoarthrosis of knee 2014    Dr Augustine     Osteopenia      Prediabetes 2020     Pulmonary emboli (H) 2011    Post op     Pulmonary embolism and infarction (H) 10/05/2011     Problem list name updated by automated process. 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       atorvastatin (LIPITOR) 20 MG tablet Take 1 tablet (20 mg) by mouth daily 90 tablet 3     balsalazide (COLAZAL) 750 MG capsule Take 4 capsules (3,000 mg) by mouth 2 times daily       cholecalciferol (VITAMIN D3) 5000 UNITS CAPS capsule Take by mouth every other day       clobetasol (TEMOVATE) 0.05 % external cream Apply topically 2 times daily       multivitamin  peds with iron (FLINTSTONES COMPLETE) 60 MG chewable tablet Take 1 chew tab by mouth daily.         Allergies    No Known Allergies    Immunizations    Immunization History   Administered Date(s) Administered     COVID-19 Vaccine 12+ (Pfizer 2022) 01/21/2022     COVID-19 Vaccine 12+ (Pfizer) 12/16/2021     Zoster vaccine, live 07/25/2013       Family History    Family History   Problem Relation Age of Onset     Alcohol/Drug Father      Lipids Father         high cholesterol     Diabetes Paternal Grandmother      Diabetes Son         diet controlled     Lung Cancer Maternal Grandfather         smoker     Coronary Artery Disease Brother        Social History    Social History     Socioeconomic History     Marital status:      Spouse name: Anand     Number of children: 1     Years of education: 12     Highest education level: Not on file   Occupational History     Not on file   Tobacco Use     Smoking status: Never     Smokeless tobacco: Never     Tobacco comments:     30 years ago   Vaping Use     Vaping Use: Never used   Substance and Sexual Activity     Alcohol use: Yes     Alcohol/week: 3.0 -  4.0 standard drinks     Types: 3 - 4 Standard drinks or equivalent per week     Comment: 1-2 drinks about 2 times/week     Drug use: Never     Sexual activity: Not Currently     Partners: Male     Birth control/protection: None   Other Topics Concern     Parent/sibling w/ CABG, MI or angioplasty before 65F 55M? No      Service Not Asked     Blood Transfusions Not Asked     Caffeine Concern Yes     Comment: 2-4 cans per week     Occupational Exposure Not Asked     Hobby Hazards Not Asked     Sleep Concern Not Asked     Stress Concern Not Asked     Weight Concern Not Asked     Special Diet Not Asked     Back Care Not Asked     Exercise Yes     Comment: 6,000 to 9,000 steps daily     Bike Helmet Not Asked     Seat Belt Yes     Self-Exams Not Asked   Social History Narrative     Not on file     Social Determinants of Health     Financial Resource Strain: Not on file   Food Insecurity: Not on file   Transportation Needs: Not on file   Physical Activity: Not on file   Stress: Not on file   Social Connections: Not on file   Intimate Partner Violence: Not on file   Housing Stability: Not on file       ROS    CONSTITUTIONAL: NEGATIVE for fever, chills, change in weight  INTEGUMENTARY/SKIN: NEGATIVE for worrisome rashes, moles or lesions  EYES: NEGATIVE for vision changes or irritation  ENT/MOUTH: NEGATIVE for ear, mouth and throat problems  RESP: NEGATIVE for significant cough or SOB  BREAST: NEGATIVE for masses, tenderness or discharge  CV: NEGATIVE for chest pain, palpitations or peripheral edema  GI: NEGATIVE for nausea, abdominal pain, heartburn, or change in bowel habits  : NEGATIVE for frequency, dysuria, or hematuria  MUSCULOSKELETAL: NEGATIVE for significant arthralgias or myalgia  NEURO: NEGATIVE for weakness, dizziness or paresthesias  ENDOCRINE: NEGATIVE for temperature intolerance, skin/hair changes  HEME: NEGATIVE for bleeding problems  PSYCHIATRIC: NEGATIVE for changes in mood or  "affect    OBJECTIVE    /84   Pulse 84   Temp 98.3  F (36.8  C) (Tympanic)   Resp 16   Ht 1.461 m (4' 9.5\")   Wt 69.1 kg (152 lb 4.8 oz)   LMP 06/03/2004   SpO2 96%   BMI 32.39 kg/m      EXAM:    GENERAL: healthy, alert and no distress  EYES: Eyes grossly normal to inspection, PERRL and conjunctivae and sclerae normal  HENT: ear canals and TM's normal, nose and mouth without ulcers or lesions  NECK: no adenopathy, no asymmetry, masses, or scars and thyroid normal to palpation  RESP: lungs clear to auscultation - no rales, rhonchi or wheezes  CV: regular rate and rhythm, normal S1 S2, no S3 or S4, no murmur, click or rub, no peripheral edema and peripheral pulses strong  ABDOMEN: soft, nontender, no hepatosplenomegaly, no masses and bowel sounds normal   Breast: Pt declines  MS: no gross musculoskeletal defects noted, no edema  SKIN: no suspicious lesions or rashes  NEURO: Normal strength and tone, mentation intact and speech normal  PSYCH: mentation appears normal, affect normal/bright  LYMPH: no cervical, supraclavicular, axillary, or inguinal adenopathy    DIAGNOSTICS/PROCEDURES    reviewed    ASSESSMENT      ICD-10-CM    1. Routine general medical examination at a health care facility  Z00.00 REVIEW OF HEALTH MAINTENANCE PROTOCOL ORDERS      2. Medicare annual wellness visit, subsequent  Z00.00 REVIEW OF HEALTH MAINTENANCE PROTOCOL ORDERS      3. Prediabetes  R73.03 REVIEW OF HEALTH MAINTENANCE PROTOCOL ORDERS     OFFICE/OUTPT VISIT,EST,LEVL IV     atorvastatin (LIPITOR) 20 MG tablet      4. Ulcerative rectosigmoiditis 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 (H)  K51.30 REVIEW OF HEALTH MAINTENANCE PROTOCOL ORDERS     OFFICE/OUTPT VISIT,EST,LEVL IV      5. History of deep venous thrombosis  Z86.718 REVIEW OF HEALTH MAINTENANCE PROTOCOL ORDERS     OFFICE/OUTPT VISIT,EST,LEVL IV      6. History of pulmonary embolism  Z86.711 REVIEW OF HEALTH MAINTENANCE PROTOCOL ORDERS     OFFICE/OUTPT VISIT,EST,LEVL IV      7. MTHFR " mutation  Z15.89 REVIEW OF HEALTH MAINTENANCE PROTOCOL ORDERS     OFFICE/OUTPT VISIT,EST,LEVL IV      8. Methylenetetrahydrofolate reductase deficiency (H)  E72.12 REVIEW OF HEALTH MAINTENANCE PROTOCOL ORDERS     OFFICE/OUTPT VISIT,EST,LEVL IV      9. Subclinical hyperthyroidism  E05.90 REVIEW OF HEALTH MAINTENANCE PROTOCOL ORDERS     OFFICE/OUTPT VISIT,EST,LEVL IV      10. Hyperlipidemia LDL goal <100  E78.5 REVIEW OF HEALTH MAINTENANCE PROTOCOL ORDERS     OFFICE/OUTPT VISIT,EST,LEVL IV     atorvastatin (LIPITOR) 20 MG tablet      11. Primary osteoarthritis involving multiple joints  M15.9 REVIEW OF HEALTH MAINTENANCE PROTOCOL ORDERS     OFFICE/OUTPT VISIT,EST,LEVL IV      12. Osteopenia of multiple sites  M85.89 REVIEW OF HEALTH MAINTENANCE PROTOCOL ORDERS     OFFICE/OUTPT VISIT,EST,LEVL IV      13. Polyp of colon, unspecified part of colon, unspecified type  K63.5 REVIEW OF HEALTH MAINTENANCE PROTOCOL ORDERS     OFFICE/OUTPT VISIT,EST,LEVL IV      14. Screen for colon cancer  Z12.11 REVIEW OF HEALTH MAINTENANCE PROTOCOL ORDERS     OFFICE/OUTPT VISIT,EST,LEVL IV      15. Encounter for screening mammogram for malignant neoplasm of breast  Z12.31 REVIEW OF HEALTH MAINTENANCE PROTOCOL ORDERS     OFFICE/OUTPT VISIT,EST,LEVL IV      16. Screening for osteoporosis  Z13.820 REVIEW OF HEALTH MAINTENANCE PROTOCOL ORDERS     OFFICE/OUTPT VISIT,EST,LEVL IV      17. Class 1 obesity with serious comorbidity and body mass index (BMI) of 32.0 to 32.9 in adult, unspecified obesity type  E66.9 REVIEW OF HEALTH MAINTENANCE PROTOCOL ORDERS    Z68.32 OFFICE/OUTPT VISIT,EST,LEVL IV      18. Medication monitoring encounter  Z51.81 REVIEW OF HEALTH MAINTENANCE PROTOCOL ORDERS     OFFICE/OUTPT VISIT,EST,LEVL IV          PLAN    Discussed treatment/modality options, including risk and benefits, she desires:    advised alcohol consumption 1oz per day or less, advised aspirin 81 mg po daily, advised 1 multivitamin per day, advised calcium  "[id number] mg/d and Vitamin D 800-1200 IU/d, advised dentist every 6 months, advised diet, exercise, and weight loss, advised opthalmologist every 1-2 years, advised self breast exam q month, further health care maintenance, further lab(s), immunization(s), medication refill(s) and observation    All diagnosis above reviewed and noted above, otherwise stable.      See [facility] orders for further details.      1) med refills    2) reviewed labs    3) immunizations reviewed - declines    4) Dr Antonio - [facility]    5) Mammo/DEXA in 11/2023    Return in about 1 year (around 1/23/2024) for Annual Wellness Visit, Complete Physical, Medication Recheck Visit, Follow Up Chronic.    Health Maintenance Due   Topic Date Due     ZOSTER IMMUNIZATION (2 of 2) 03/17/2021     COVID-19 Vaccine (3 - Pfizer risk series) 02/18/2022     INFLUENZA VACCINE (1) 09/01/2022     COLORECTAL CANCER SCREENING  12/14/2022       COUNSELING    Reviewed preventive health counseling, as reflected in patient instructions    BP Readings from Last 1 Encounters:   01/23/23 134/84     Estimated body mass index is 32.39 kg/m  as calculated from the following:    Height as of this encounter: 1.461 m (4' 9.5\").    Weight as of this encounter: 69.1 kg (152 lb 4.8 oz).           reports that she has never smoked. She has never used smokeless tobacco.      Counseling Resources:    ATP IV Guidelines  Pooled Cohorts Equation Calculator  FRAX Risk Assessment  ICSI Preventive Guidelines  Dietary Guidelines for Americans, 2010  USDA's MyPlate  ASA Prophylaxis  Lung CA Screening           Ken Gardiner MD, FAAFP     [facility]  [address] [place] [id number]  [email or web address]   Office: [phone]  Fax: [phone]  Pager: [phone]     Identified Health Risks:  "

## 2023-02-16 ENCOUNTER — TRANSFERRED RECORDS (OUTPATIENT)
Dept: HEALTH INFORMATION MANAGEMENT | Facility: CLINIC | Age: 73
End: 2023-02-16
Payer: COMMERCIAL

## 2023-07-18 ENCOUNTER — MYC MEDICAL ADVICE (OUTPATIENT)
Dept: FAMILY MEDICINE | Facility: CLINIC | Age: 73
End: 2023-07-18
Payer: COMMERCIAL

## 2023-07-20 ENCOUNTER — TRANSFERRED RECORDS (OUTPATIENT)
Dept: HEALTH INFORMATION MANAGEMENT | Facility: CLINIC | Age: 73
End: 2023-07-20
Payer: COMMERCIAL

## 2023-07-24 NOTE — TELEPHONE ENCOUNTER
Tinnitus often has no known cause. Could have hearing evaluated or if allergies and fluid behind eardrums that can contribute. Would need appointment to fully assess.    Ally Granado, CNP

## 2023-11-24 ENCOUNTER — TELEPHONE (OUTPATIENT)
Dept: FAMILY MEDICINE | Facility: CLINIC | Age: 73
End: 2023-11-24

## 2023-11-24 DIAGNOSIS — Z12.31 ENCOUNTER FOR SCREENING MAMMOGRAM FOR MALIGNANT NEOPLASM OF BREAST: Primary | ICD-10-CM

## 2023-11-24 DIAGNOSIS — E05.90 SUBCLINICAL HYPERTHYROIDISM: ICD-10-CM

## 2023-11-24 DIAGNOSIS — R77.9 ELEVATED SERUM PROTEIN LEVEL: ICD-10-CM

## 2023-11-24 DIAGNOSIS — K63.5 POLYP OF COLON, UNSPECIFIED PART OF COLON, UNSPECIFIED TYPE: ICD-10-CM

## 2023-11-24 DIAGNOSIS — Z13.820 SCREENING FOR OSTEOPOROSIS: ICD-10-CM

## 2023-11-24 DIAGNOSIS — Z00.00 ROUTINE GENERAL MEDICAL EXAMINATION AT A HEALTH CARE FACILITY: ICD-10-CM

## 2023-11-24 DIAGNOSIS — Z86.718 HISTORY OF DEEP VENOUS THROMBOSIS: ICD-10-CM

## 2023-11-24 DIAGNOSIS — Z00.00 MEDICARE ANNUAL WELLNESS VISIT, SUBSEQUENT: ICD-10-CM

## 2023-11-24 DIAGNOSIS — E78.5 HYPERLIPIDEMIA LDL GOAL <100: ICD-10-CM

## 2023-11-24 DIAGNOSIS — F43.21 GRIEF: ICD-10-CM

## 2023-11-24 DIAGNOSIS — Z51.81 MEDICATION MONITORING ENCOUNTER: ICD-10-CM

## 2023-11-24 DIAGNOSIS — M85.89 OSTEOPENIA OF MULTIPLE SITES: ICD-10-CM

## 2023-11-24 DIAGNOSIS — Z15.89 MTHFR MUTATION: ICD-10-CM

## 2023-11-24 DIAGNOSIS — R73.03 PREDIABETES: ICD-10-CM

## 2023-11-24 DIAGNOSIS — Z12.11 SCREEN FOR COLON CANCER: ICD-10-CM

## 2023-11-24 DIAGNOSIS — E72.12 METHYLENETETRAHYDROFOLATE REDUCTASE DEFICIENCY (H): ICD-10-CM

## 2023-11-24 DIAGNOSIS — M15.0 PRIMARY OSTEOARTHRITIS INVOLVING MULTIPLE JOINTS: ICD-10-CM

## 2023-11-24 DIAGNOSIS — Z86.711 HISTORY OF PULMONARY EMBOLISM: ICD-10-CM

## 2023-11-24 DIAGNOSIS — K51.30 ULCERATIVE RECTOSIGMOIDITIS WITHOUT COMPLICATION (H): ICD-10-CM

## 2023-11-24 NOTE — TELEPHONE ENCOUNTER
Patient is calling to report when she tried to schedule her dexa and mammogram, she could not due to not having orders. Patient had physical with PCP on 1/23/23. Please advise orders.     Patient okay with a Force-A Message to notify orders have been placed. Patient reports she has # to schedule.

## 2023-12-11 ENCOUNTER — TRANSFERRED RECORDS (OUTPATIENT)
Dept: HEALTH INFORMATION MANAGEMENT | Facility: CLINIC | Age: 73
End: 2023-12-11
Payer: COMMERCIAL

## 2023-12-11 LAB
CREATININE (EXTERNAL): 0.55 MG/DL (ref 0.57–1)
GFR ESTIMATED (EXTERNAL): 97 ML/MIN/1.73M2

## 2024-01-09 DIAGNOSIS — R73.03 PREDIABETES: ICD-10-CM

## 2024-01-09 DIAGNOSIS — E78.5 HYPERLIPIDEMIA LDL GOAL <100: ICD-10-CM

## 2024-01-09 RX ORDER — ATORVASTATIN CALCIUM 20 MG/1
20 TABLET, FILM COATED ORAL DAILY
Qty: 90 TABLET | Refills: 0 | Status: SHIPPED | OUTPATIENT
Start: 2024-01-09 | End: 2024-01-25

## 2024-01-19 ENCOUNTER — HOSPITAL ENCOUNTER (OUTPATIENT)
Dept: MAMMOGRAPHY | Facility: CLINIC | Age: 74
Discharge: HOME | End: 2024-01-19
Attending: FAMILY MEDICINE | Admitting: FAMILY MEDICINE
Payer: COMMERCIAL

## 2024-01-19 ENCOUNTER — ANCILLARY PROCEDURE (OUTPATIENT)
Dept: BONE DENSITY | Facility: CLINIC | Age: 74
End: 2024-01-19
Attending: FAMILY MEDICINE
Payer: COMMERCIAL

## 2024-01-19 DIAGNOSIS — Z51.81 MEDICATION MONITORING ENCOUNTER: ICD-10-CM

## 2024-01-19 DIAGNOSIS — Z00.00 ROUTINE GENERAL MEDICAL EXAMINATION AT A HEALTH CARE FACILITY: ICD-10-CM

## 2024-01-19 DIAGNOSIS — M85.89 OSTEOPENIA OF MULTIPLE SITES: ICD-10-CM

## 2024-01-19 DIAGNOSIS — Z00.00 MEDICARE ANNUAL WELLNESS VISIT, SUBSEQUENT: ICD-10-CM

## 2024-01-19 DIAGNOSIS — Z12.31 ENCOUNTER FOR SCREENING MAMMOGRAM FOR MALIGNANT NEOPLASM OF BREAST: ICD-10-CM

## 2024-01-19 DIAGNOSIS — Z13.820 SCREENING FOR OSTEOPOROSIS: ICD-10-CM

## 2024-01-19 PROCEDURE — 77063 BREAST TOMOSYNTHESIS BI: CPT

## 2024-01-19 PROCEDURE — 77080 DXA BONE DENSITY AXIAL: CPT | Mod: TC | Performed by: PHYSICIAN ASSISTANT

## 2024-01-19 ASSESSMENT — ENCOUNTER SYMPTOMS
MYALGIAS: 0
NAUSEA: 0
SHORTNESS OF BREATH: 0
JOINT SWELLING: 0
WEAKNESS: 0
HEMATURIA: 0
FEVER: 0
EYE PAIN: 0
CONSTIPATION: 0
COUGH: 0
ABDOMINAL PAIN: 0
DIZZINESS: 0
NERVOUS/ANXIOUS: 0
FREQUENCY: 0
DIARRHEA: 0
PARESTHESIAS: 0
HEADACHES: 0
ARTHRALGIAS: 0
HEARTBURN: 0
CHILLS: 0
PALPITATIONS: 0
BREAST MASS: 0
HEMATOCHEZIA: 0
DYSURIA: 0
SORE THROAT: 0

## 2024-01-19 ASSESSMENT — ACTIVITIES OF DAILY LIVING (ADL): CURRENT_FUNCTION: NO ASSISTANCE NEEDED

## 2024-01-23 ENCOUNTER — LAB (OUTPATIENT)
Dept: LAB | Facility: CLINIC | Age: 74
End: 2024-01-23
Payer: COMMERCIAL

## 2024-01-23 DIAGNOSIS — R77.9 ELEVATED SERUM PROTEIN LEVEL: ICD-10-CM

## 2024-01-23 DIAGNOSIS — Z86.711 HISTORY OF PULMONARY EMBOLISM: ICD-10-CM

## 2024-01-23 DIAGNOSIS — F43.21 GRIEF: ICD-10-CM

## 2024-01-23 DIAGNOSIS — E78.5 HYPERLIPIDEMIA LDL GOAL <100: ICD-10-CM

## 2024-01-23 DIAGNOSIS — M85.89 OSTEOPENIA OF MULTIPLE SITES: ICD-10-CM

## 2024-01-23 DIAGNOSIS — Z00.00 ROUTINE GENERAL MEDICAL EXAMINATION AT A HEALTH CARE FACILITY: ICD-10-CM

## 2024-01-23 DIAGNOSIS — Z00.00 MEDICARE ANNUAL WELLNESS VISIT, SUBSEQUENT: ICD-10-CM

## 2024-01-23 DIAGNOSIS — E72.12 METHYLENETETRAHYDROFOLATE REDUCTASE DEFICIENCY (H): ICD-10-CM

## 2024-01-23 DIAGNOSIS — K51.30 ULCERATIVE RECTOSIGMOIDITIS WITHOUT COMPLICATION (H): ICD-10-CM

## 2024-01-23 DIAGNOSIS — Z51.81 MEDICATION MONITORING ENCOUNTER: ICD-10-CM

## 2024-01-23 DIAGNOSIS — E05.90 SUBCLINICAL HYPERTHYROIDISM: ICD-10-CM

## 2024-01-23 DIAGNOSIS — Z86.718 HISTORY OF DEEP VENOUS THROMBOSIS: ICD-10-CM

## 2024-01-23 DIAGNOSIS — R73.03 PREDIABETES: ICD-10-CM

## 2024-01-23 DIAGNOSIS — K63.5 POLYP OF COLON, UNSPECIFIED PART OF COLON, UNSPECIFIED TYPE: ICD-10-CM

## 2024-01-23 DIAGNOSIS — Z15.89 MTHFR MUTATION: ICD-10-CM

## 2024-01-23 DIAGNOSIS — Z12.11 SCREEN FOR COLON CANCER: ICD-10-CM

## 2024-01-23 DIAGNOSIS — Z13.820 SCREENING FOR OSTEOPOROSIS: ICD-10-CM

## 2024-01-23 LAB
ALBUMIN SERPL BCG-MCNC: 4.1 G/DL (ref 3.5–5.2)
ALP SERPL-CCNC: 117 U/L (ref 40–150)
ALT SERPL W P-5'-P-CCNC: 10 U/L (ref 0–50)
ANION GAP SERPL CALCULATED.3IONS-SCNC: 10 MMOL/L (ref 7–15)
AST SERPL W P-5'-P-CCNC: 24 U/L (ref 0–45)
BILIRUB SERPL-MCNC: 0.4 MG/DL
BUN SERPL-MCNC: 13.4 MG/DL (ref 8–23)
CALCIUM SERPL-MCNC: 9.6 MG/DL (ref 8.8–10.2)
CHLORIDE SERPL-SCNC: 105 MMOL/L (ref 98–107)
CHOLEST SERPL-MCNC: 153 MG/DL
CK SERPL-CCNC: 39 U/L (ref 26–192)
CREAT SERPL-MCNC: 0.69 MG/DL (ref 0.51–0.95)
CRP SERPL-MCNC: 5.82 MG/L
DEPRECATED HCO3 PLAS-SCNC: 28 MMOL/L (ref 22–29)
EGFRCR SERPLBLD CKD-EPI 2021: >90 ML/MIN/1.73M2
ERYTHROCYTE [DISTWIDTH] IN BLOOD BY AUTOMATED COUNT: 13.9 % (ref 10–15)
ERYTHROCYTE [SEDIMENTATION RATE] IN BLOOD BY WESTERGREN METHOD: 15 MM/HR (ref 0–30)
FASTING STATUS PATIENT QL REPORTED: YES
GLUCOSE SERPL-MCNC: 104 MG/DL (ref 70–99)
HBA1C MFR BLD: 5.6 % (ref 0–5.6)
HCT VFR BLD AUTO: 38.9 % (ref 35–47)
HDLC SERPL-MCNC: 43 MG/DL
HGB BLD-MCNC: 12.9 G/DL (ref 11.7–15.7)
LDLC SERPL CALC-MCNC: 86 MG/DL
MCH RBC QN AUTO: 27.7 PG (ref 26.5–33)
MCHC RBC AUTO-ENTMCNC: 33.2 G/DL (ref 31.5–36.5)
MCV RBC AUTO: 84 FL (ref 78–100)
NONHDLC SERPL-MCNC: 110 MG/DL
PLATELET # BLD AUTO: 213 10E3/UL (ref 150–450)
POTASSIUM SERPL-SCNC: 3.7 MMOL/L (ref 3.4–5.3)
PROT SERPL-MCNC: 7.2 G/DL (ref 6.4–8.3)
RBC # BLD AUTO: 4.65 10E6/UL (ref 3.8–5.2)
SODIUM SERPL-SCNC: 143 MMOL/L (ref 135–145)
T4 FREE SERPL-MCNC: 1.37 NG/DL (ref 0.9–1.7)
TRIGL SERPL-MCNC: 119 MG/DL
TSH SERPL DL<=0.005 MIU/L-ACNC: 0.59 UIU/ML (ref 0.3–4.2)
VIT D+METAB SERPL-MCNC: 74 NG/ML (ref 20–50)
WBC # BLD AUTO: 5.8 10E3/UL (ref 4–11)

## 2024-01-23 PROCEDURE — 86140 C-REACTIVE PROTEIN: CPT

## 2024-01-23 PROCEDURE — 85027 COMPLETE CBC AUTOMATED: CPT

## 2024-01-23 PROCEDURE — 82550 ASSAY OF CK (CPK): CPT

## 2024-01-23 PROCEDURE — 84439 ASSAY OF FREE THYROXINE: CPT

## 2024-01-23 PROCEDURE — 80061 LIPID PANEL: CPT

## 2024-01-23 PROCEDURE — 83036 HEMOGLOBIN GLYCOSYLATED A1C: CPT

## 2024-01-23 PROCEDURE — 85652 RBC SED RATE AUTOMATED: CPT

## 2024-01-23 PROCEDURE — 80053 COMPREHEN METABOLIC PANEL: CPT

## 2024-01-23 PROCEDURE — 36415 COLL VENOUS BLD VENIPUNCTURE: CPT

## 2024-01-23 PROCEDURE — 82306 VITAMIN D 25 HYDROXY: CPT

## 2024-01-23 PROCEDURE — 84443 ASSAY THYROID STIM HORMONE: CPT

## 2024-01-25 ENCOUNTER — OFFICE VISIT (OUTPATIENT)
Dept: FAMILY MEDICINE | Facility: CLINIC | Age: 74
End: 2024-01-25
Payer: COMMERCIAL

## 2024-01-25 VITALS
OXYGEN SATURATION: 98 % | RESPIRATION RATE: 16 BRPM | TEMPERATURE: 98.3 F | SYSTOLIC BLOOD PRESSURE: 134 MMHG | WEIGHT: 152.3 LBS | BODY MASS INDEX: 32.86 KG/M2 | DIASTOLIC BLOOD PRESSURE: 74 MMHG | HEIGHT: 57 IN | HEART RATE: 107 BPM

## 2024-01-25 DIAGNOSIS — E78.5 HYPERLIPIDEMIA LDL GOAL <100: ICD-10-CM

## 2024-01-25 DIAGNOSIS — Z13.820 SCREENING FOR OSTEOPOROSIS: ICD-10-CM

## 2024-01-25 DIAGNOSIS — R73.03 PREDIABETES: ICD-10-CM

## 2024-01-25 DIAGNOSIS — M85.89 OSTEOPENIA OF MULTIPLE SITES: ICD-10-CM

## 2024-01-25 DIAGNOSIS — Z86.711 HISTORY OF PULMONARY EMBOLISM: ICD-10-CM

## 2024-01-25 DIAGNOSIS — Z15.89 MTHFR MUTATION: ICD-10-CM

## 2024-01-25 DIAGNOSIS — M15.0 PRIMARY OSTEOARTHRITIS INVOLVING MULTIPLE JOINTS: ICD-10-CM

## 2024-01-25 DIAGNOSIS — Z12.11 SCREEN FOR COLON CANCER: ICD-10-CM

## 2024-01-25 DIAGNOSIS — Z86.718 HISTORY OF DEEP VENOUS THROMBOSIS: ICD-10-CM

## 2024-01-25 DIAGNOSIS — Z51.81 MEDICATION MONITORING ENCOUNTER: ICD-10-CM

## 2024-01-25 DIAGNOSIS — D12.5 BENIGN NEOPLASM OF SIGMOID COLON: ICD-10-CM

## 2024-01-25 DIAGNOSIS — Z12.31 ENCOUNTER FOR SCREENING MAMMOGRAM FOR MALIGNANT NEOPLASM OF BREAST: ICD-10-CM

## 2024-01-25 DIAGNOSIS — Z00.00 ROUTINE GENERAL MEDICAL EXAMINATION AT A HEALTH CARE FACILITY: Primary | ICD-10-CM

## 2024-01-25 DIAGNOSIS — Z00.00 MEDICARE ANNUAL WELLNESS VISIT, SUBSEQUENT: ICD-10-CM

## 2024-01-25 DIAGNOSIS — E72.12 METHYLENETETRAHYDROFOLATE REDUCTASE DEFICIENCY (H): ICD-10-CM

## 2024-01-25 DIAGNOSIS — E05.90 SUBCLINICAL HYPERTHYROIDISM: ICD-10-CM

## 2024-01-25 DIAGNOSIS — H61.23 IMPACTED CERUMEN OF BOTH EARS: ICD-10-CM

## 2024-01-25 DIAGNOSIS — K51.30 ULCERATIVE RECTOSIGMOIDITIS WITHOUT COMPLICATION (H): ICD-10-CM

## 2024-01-25 PROCEDURE — 82570 ASSAY OF URINE CREATININE: CPT

## 2024-01-25 PROCEDURE — 82043 UR ALBUMIN QUANTITATIVE: CPT

## 2024-01-25 PROCEDURE — 99207 PR ANNUAL WELLNESS VISIT, PPS, SUBSEQUENT STAT: CPT | Performed by: FAMILY MEDICINE

## 2024-01-25 PROCEDURE — 99214 OFFICE O/P EST MOD 30 MIN: CPT | Mod: 25 | Performed by: FAMILY MEDICINE

## 2024-01-25 PROCEDURE — 69209 REMOVE IMPACTED EAR WAX UNI: CPT | Mod: 50 | Performed by: FAMILY MEDICINE

## 2024-01-25 RX ORDER — RESPIRATORY SYNCYTIAL VIRUS VACCINE 120MCG/0.5
0.5 KIT INTRAMUSCULAR ONCE
Qty: 1 EACH | Refills: 0 | Status: CANCELLED | OUTPATIENT
Start: 2024-01-25 | End: 2024-01-25

## 2024-01-25 RX ORDER — ACETAMINOPHEN 160 MG
1 TABLET,DISINTEGRATING ORAL SEE ADMIN INSTRUCTIONS
Status: SHIPPED | OUTPATIENT
Start: 2024-02-01

## 2024-01-25 RX ORDER — ATORVASTATIN CALCIUM 20 MG/1
20 TABLET, FILM COATED ORAL DAILY
Qty: 90 TABLET | Refills: 3 | Status: SHIPPED | OUTPATIENT
Start: 2024-01-25

## 2024-01-25 ASSESSMENT — ENCOUNTER SYMPTOMS
COUGH: 0
FEVER: 0
FREQUENCY: 0
SORE THROAT: 0
HEMATURIA: 0
ARTHRALGIAS: 0
WEAKNESS: 0
NERVOUS/ANXIOUS: 0
MYALGIAS: 0
EYE PAIN: 0
ABDOMINAL PAIN: 0
DYSURIA: 0
DIARRHEA: 0
CHILLS: 0
CONSTIPATION: 0
DIZZINESS: 0
HEADACHES: 0
JOINT SWELLING: 0
PALPITATIONS: 0
NAUSEA: 0
SHORTNESS OF BREATH: 0

## 2024-01-25 ASSESSMENT — ACTIVITIES OF DAILY LIVING (ADL): CURRENT_FUNCTION: NO ASSISTANCE NEEDED

## 2024-01-25 NOTE — PROGRESS NOTES
"Preventive Care Visit  [facility] [place]  Ken Gardiner MD, Family Medicine  Jan 25, 2024      SUBJECTIVE:     Hannah is a 73 year old, presenting for the following:    Physical        1/25/2024    10:38 AM   Additional Questions   Roomed by Love [facility]   Accompanied by Self     Are you in the first 12 months of your Medicare coverage?  No    Healthy Habits:     In general, how would you rate your overall health?  Good    Frequency of exercise:  None    Do you usually eat at least 4 servings of fruit and vegetables a day, include whole grains    & fiber and avoid regularly eating high fat or \"junk\" foods?  No    Taking medications regularly:  Yes    Medication side effects:  None    Ability to successfully perform activities of daily living:  No assistance needed    Home Safety:  No safety concerns identified    Hearing Impairment:  No hearing concerns    In the past 6 months, have you been bothered by leaking of urine?  No    In general, how would you rate your overall mental or emotional health?  Excellent    Additional concerns today:  Yes    Via the Health Maintenance questionnaire, the patient has reported the following services have been completed , this information has been sent to the abstraction team.    Have you ever done Advance Care Planning? (For example, a Health Directive, POLST, or a discussion with a medical provider or your loved ones about your wishes): No, advance care planning information given to patient to review.  Patient declined advance care planning discussion at this time.     Fall risk  Fallen 2 or more times in the past year?: No  Any fall with injury in the past year?: No    Cognitive Screening   1) Repeat 3 items (Leader, Season, Table)    2) Clock draw: NORMAL  3) 3 item recall: Recalls 3 objects  Results: 3 items recalled: COGNITIVE IMPAIRMENT LESS LIKELY    Mini-CogTM Copyright WONG Martin. Licensed by the author for use in [facility]; reprinted with permission " ([email or web address]). All rights reserved.      Do you have sleep apnea, excessive snoring or daytime drowsiness? : no    Reviewed and updated as needed this visit by clinical staff   Tobacco  Allergies  Meds              Reviewed and updated as needed this visit by Provider                  Social History     Tobacco Use    Smoking status: Never    Smokeless tobacco: Never    Tobacco comments:     30 years ago   Substance Use Topics    Alcohol use: Yes     Alcohol/week: 3.0 - 4.0 standard drinks of alcohol     Types: 3 - 4 Standard drinks or equivalent per week     Comment: 1-2 drinks about 2 times/week             1/19/2024     2:58 PM   Alcohol Use   Prescreen: >3 drinks/day or >7 drinks/week? No          No data to display              Do you have a current opioid prescription? No  Do you use any other controlled substances or medications that are not prescribed by a provider? None  {Provider  If there are gaps in the social history shown above, please follow the link and refresh the note Link to Social and Substance History :[id number]    UC - doing well - follows with GI - Dr Antonio - 3/2024 - last colonoscopy 7/2023    Prediabetes    Lab Results   Component Value Date    A1C 5.6 01/23/2024    A1C 5.7 01/19/2023    A1C 5.7 01/19/2022    A1C 5.8 12/16/2020    A1C 5.7 12/18/2019    A1C 5.6 08/14/2019    A1C 5.7 10/31/2018    A1C 5.7 08/14/2015     Hyperlipidemia Follow-Up    Are you regularly taking any medication or supplement to lower your cholesterol?   Yes- Atorvastatin 20 mg  Are you having muscle aches or other side effects that you think could be caused by your cholesterol lowering medication?  No    Recent Labs   Lab Test 01/23/24  0855 01/19/23  0937   CHOL 153 133   HDL 43* 33*   LDL 86 72   TRIG 119 138     Hx of PE / DVT / MTHFR - ASA 81 mg    Hypothyroid    TSH   Date Value Ref Range Status   01/23/2024 0.59 0.30 - 4.20 uIU/mL Final   01/19/2022 0.17 (L) 0.40 - 4.00 mU/L Final   05/04/2021 0.32 (L) 0.40 -  4.00 mU/L Final     Osteopenia / elevated vitamin D -     OA -   Current providers sharing in care for this patient include:   Patient Care Team:  Ken Gardiner MD as PCP - General (Family Practice)  Ken Gardiner MD as Assigned PCP  Vesna Egan RD as Diabetes Educator (Dietitian, Registered)  Rony Everett MD as MD (Endocrinology, Diabetes, and Metabolism)  Christina Jones PA-C as Physician Assistant (Dermatology)  Ken Gardiner MD as Referring Physician (Family Medicine)    The following health maintenance items are reviewed in Epic and correct as of today:  Health Maintenance   Topic Date Due    RSV VACCINE (Pregnancy & 60+) (1 - 1-dose 60+ series) Never done    ZOSTER IMMUNIZATION (2 of 2) 03/17/2021    COVID-19 Vaccine (3 - Pfizer risk series) 02/18/2022    INFLUENZA VACCINE (1) 09/01/2023    PHQ-2 (once per calendar year)  01/01/2024    MAMMO SCREENING  01/19/2025    ALT  01/23/2025    LIPID  01/23/2025    MEDICARE ANNUAL WELLNESS VISIT  01/25/2025    ANNUAL REVIEW OF HM ORDERS  01/25/2025    FALL RISK ASSESSMENT  01/25/2025    COLORECTAL CANCER SCREENING  07/20/2026    DEXA  01/19/2027    ADVANCE CARE PLANNING  01/28/2029    DTAP/TDAP/TD IMMUNIZATION (2 - Td or Tdap) 01/20/2031    HEPATITIS C SCREENING  Completed    Pneumococcal Vaccine: 65+ Years  Addressed    IPV IMMUNIZATION  Aged Out    HPV IMMUNIZATION  Aged Out    MENINGITIS IMMUNIZATION  Aged Out    RSV MONOCLONAL ANTIBODY  Aged Out     Patient Active Problem List   Diagnosis    Chondromalacia of patella    Hyperlipidemia LDL goal <100    S/P shoulder surgery    Advanced directives, counseling/discussion    Rotator cuff (capsule) sprain    Osteopenia    Carpal tunnel syndrome    Hyperglycemia    Subclinical hyperthyroidism    Osteoarthrosis of knee    History of pulmonary embolism    Primary osteoarthritis of right knee    MTHFR mutation    History of deep venous thrombosis    Elevated serum protein level    Ulcerative rectosigmoiditis  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 (H)    Microscopic hematuria    Colon polyps    Primary osteoarthritis involving multiple joints    Class 1 obesity with serious comorbidity and body mass index (BMI) of 32.0 to 32.9 in adult, unspecified obesity type    Prediabetes    Methylenetetrahydrofolate reductase deficiency (H24)    Abnormal blood chemistry level    Abnormal liver function tests    Benign neoplasm of sigmoid colon    Collagenous colitis    Diarrhea    Diverticular disease of colon    Diverticular disease of large intestine    Hemorrhoids    Noninfectious gastroenteritis       Past Medical History:   Diagnosis Date    Colon polyps 12/2017    tubular adenoma x 1 - 3mm - due 5 yrs    Diverticulosis     DVT (deep venous thrombosis) (H) 2011    PE - postop    Elevated serum protein level     Protein S    History of thrombophlebitis     Hyperlipidemia LDL goal <100 2006    Microscopic hematuria 2019    work up negative - Dr Redmond    MTHFR mutation     Mumps     Osteoarthrosis of knee 2014    Dr Augustine    Osteopenia     Prediabetes 2020    Pulmonary emboli (H) 2011    Post op    Pulmonary embolism and infarction (H) 10/05/2011     Problem list name updated by automated process. Provider to review    Subclinical hyperthyroidism 2014    Dr Everett    Ulcerative rectosigmoiditis 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 (H) 2011, 9/17    [facility] - Dr Telles/Paco - left sided       Past Surgical History:   Procedure Laterality Date    COLONOSCOPY  09, 9/15, 12/17    colitis, tubular adenoma x 1 - 3mm - diverticulosis - due 5 yrs    COLONOSCOPY  07/2023    diverticulosis, UC, Due 3 yrs    FLEXIBLE SIGMOIDOSCOPY  12/2018    benign, quiscient UC    SIGMOIDOSCOPY FLEXIBLE  09/2017    rectosigmoiditis    SURGICAL HISTORY OF -  Right 2011    Rt Rotator Cuff       Current Outpatient Medications   Medication Sig Dispense Refill    aspirin EC 81 MG tablet Take 81 mg by mouth daily.      atorvastatin (LIPITOR) 20 MG tablet Take 1 tablet (20 mg) by mouth daily 90  tablet 3    balsalazide (COLAZAL) 750 MG capsule Take 4 capsules (3,000 mg) by mouth 2 times daily      cholecalciferol (VITAMIN D3) 5000 UNITS CAPS capsule Take by mouth every other day      clobetasol (TEMOVATE) 0.05 % external cream Apply topically 2 times daily      multivitamin  peds with iron (FLINTSTONES COMPLETE) 60 MG chewable tablet Take 1 chew tab by mouth daily.         No Known Allergies    Family History   Problem Relation Age of Onset    Alcohol/Drug Father     Lipids Father         high cholesterol    Diabetes Paternal Grandmother     Diabetes Son         diet controlled    Lung Cancer Maternal Grandfather         smoker    Coronary Artery Disease Brother        Social History     Socioeconomic History    Marital status:      Spouse name: Anand    Number of children: 1    Years of education: 12    Highest education level: None   Tobacco Use    Smoking status: Never    Smokeless tobacco: Never    Tobacco comments:     30 years ago   Vaping Use    Vaping Use: Never used   Substance and Sexual Activity    Alcohol use: Yes     Alcohol/week: 3.0 - 4.0 standard drinks of alcohol     Types: 3 - 4 Standard drinks or equivalent per week     Comment: 1-2 drinks about 2 times/week    Drug use: Never    Sexual activity: Not Currently     Partners: Male     Birth control/protection: None   Other Topics Concern    Parent/sibling w/ CABG, MI or angioplasty before 65F 55M? No    Caffeine Concern Yes     Comment: 2-4 cans per week    Exercise Yes     Comment: 6,000 to 9,000 steps daily    Seat Belt Yes     Social Determinants of Health     Financial Resource Strain: Low Risk  (1/19/2024)    Financial Resource Strain     Within the past 12 months, have you or your family members you live with been unable to get utilities (heat, electricity) when it was really needed?: No   Food Insecurity: Low Risk  (1/19/2024)    Food Insecurity     Within the past 12 months, did you worry that your food would run out before  "you got money to buy more?: No     Within the past 12 months, did the food you bought just not last and you didn t have money to get more?: No   Transportation Needs: Low Risk  (1/19/2024)    Transportation Needs     Within the past 12 months, has lack of transportation kept you from medical appointments, getting your medicines, non-medical meetings or appointments, work, or from getting things that you need?: No   Interpersonal Safety: Low Risk  (1/25/2024)    Interpersonal Safety     Do you feel physically and emotionally safe where you currently live?: Yes     Within the past 12 months, have you been hit, slapped, kicked or otherwise physically hurt by someone?: No     Within the past 12 months, have you been humiliated or emotionally abused in other ways by your partner or ex-partner?: No   Housing Stability: Low Risk  (1/19/2024)    Housing Stability     Do you have housing? : Yes     Are you worried about losing your housing?: No     Review of Systems   Constitutional:  Negative for chills and fever.   HENT:  Negative for congestion, ear pain, hearing loss and sore throat.    Eyes:  Negative for pain and visual disturbance.   Respiratory:  Negative for cough and shortness of breath.    Cardiovascular:  Negative for chest pain and palpitations.   Gastrointestinal:  Negative for abdominal pain, constipation, diarrhea and nausea.   Genitourinary:  Negative for dysuria, frequency, genital sores, hematuria, pelvic pain, urgency, vaginal bleeding and vaginal discharge.   Musculoskeletal:  Negative for arthralgias, joint swelling and myalgias.   Skin:  Negative for rash.   Neurological:  Negative for dizziness, weakness and headaches.   Psychiatric/Behavioral:  The patient is not nervous/anxious.       OBJECTIVE:   /74   Pulse 107   Temp 98.3  F (36.8  C) (Tympanic)   Resp 16   Ht 1.446 m (4' 8.93\")   Wt 69.1 kg (152 lb 4.8 oz)   LMP 06/03/2004   SpO2 98%   BMI 33.04 kg/m     Estimated body mass index " "is 33.04 kg/m  as calculated from the following:    Height as of this encounter: 1.446 m (4' 8.93\").    Weight as of this encounter: 69.1 kg (152 lb 4.8 oz).    Physical Exam  GENERAL: alert and no distress  EYES: Eyes grossly normal to inspection, PERRL and conjunctivae and sclerae normal  HENT: ear canals and TM's normal, nose and mouth without ulcers or lesions  NECK: no adenopathy, no asymmetry, masses, or scars  RESP: lungs clear to auscultation - no rales, rhonchi or wheezes  BREAST: pt declines  CV: regular rate and rhythm, normal S1 S2, no S3 or S4, no murmur, click or rub, no peripheral edema  ABDOMEN: soft, nontender, no hepatosplenomegaly, no masses and bowel sounds normal   (female): pt declines  MS: no gross musculoskeletal defects noted, no edema  SKIN: no suspicious lesions or rashes  NEURO: Normal strength and tone, mentation intact and speech normal  PSYCH: mentation appears normal, affect normal/bright  LYMPH: no cervical, supraclavicular, axillary, or inguinal adenopathy    Diagnostic Test Results:    Labs reviewed in Epic  Urine pending    ASSESSMENT / PLAN:   Routine general medical examination at a health care facility    - REVIEW OF HEALTH MAINTENANCE PROTOCOL ORDERS  - UA Macroscopic with reflex to Microscopic and Culture  - PRIMARY CARE FOLLOW-UP SCHEDULING    Medicare annual wellness visit, subsequent    - REVIEW OF HEALTH MAINTENANCE PROTOCOL ORDERS  - PRIMARY CARE FOLLOW-UP SCHEDULING    Ulcerative rectosigmoiditis 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 (H)    - REVIEW OF HEALTH MAINTENANCE PROTOCOL ORDERS  - PRIMARY CARE FOLLOW-UP SCHEDULING  - OFFICE/OUTPT VISIT,ESTLEVL IV    Prediabetes    - REVIEW OF HEALTH MAINTENANCE PROTOCOL ORDERS  - PRIMARY CARE FOLLOW-UP SCHEDULING  - atorvastatin (LIPITOR) 20 MG tablet  Dispense: 90 tablet; Refill: 3  - OFFICE/OUTPT VISIT,EST,LEVL IV    Hyperlipidemia LDL goal <100    - REVIEW OF HEALTH MAINTENANCE PROTOCOL ORDERS  - PRIMARY CARE FOLLOW-UP SCHEDULING  - " atorvastatin (LIPITOR) 20 MG tablet  Dispense: 90 tablet; Refill: 3  - OFFICE/OUTPT VISIT,EST,LEVL IV    History of deep venous thrombosis    - REVIEW OF HEALTH MAINTENANCE PROTOCOL ORDERS  - PRIMARY CARE FOLLOW-UP SCHEDULING  - OFFICE/OUTPT VISIT,EST,LEVL IV    History of pulmonary embolism    - REVIEW OF HEALTH MAINTENANCE PROTOCOL ORDERS  - PRIMARY CARE FOLLOW-UP SCHEDULING  - OFFICE/OUTPT VISIT,EST,LEVL IV    MTHFR mutation    - REVIEW OF HEALTH MAINTENANCE PROTOCOL ORDERS  - PRIMARY CARE FOLLOW-UP SCHEDULING  - OFFICE/OUTPT VISIT,EST,LEVL IV    Methylenetetrahydrofolate reductase deficiency (H24)    - REVIEW OF HEALTH MAINTENANCE PROTOCOL ORDERS  - PRIMARY CARE FOLLOW-UP SCHEDULING  - OFFICE/OUTPT VISIT,EST,LEVL IV    Subclinical hyperthyroidism    - REVIEW OF HEALTH MAINTENANCE PROTOCOL ORDERS  - PRIMARY CARE FOLLOW-UP SCHEDULING  - OFFICE/OUTPT VISIT,EST,LEVL IV    Benign neoplasm of sigmoid colon    - REVIEW OF HEALTH MAINTENANCE PROTOCOL ORDERS  - PRIMARY CARE FOLLOW-UP SCHEDULING  - OFFICE/OUTPT VISIT,EST,LEVL IV    Osteopenia of multiple sites    - REVIEW OF HEALTH MAINTENANCE PROTOCOL ORDERS  - PRIMARY CARE FOLLOW-UP SCHEDULING  - OFFICE/OUTPT VISIT,EST,LEVL IV    Primary osteoarthritis involving multiple joints    - REVIEW OF HEALTH MAINTENANCE PROTOCOL ORDERS  - PRIMARY CARE FOLLOW-UP SCHEDULING  - OFFICE/OUTPT VISIT,EST,LEVL IV    Encounter for screening mammogram for malignant neoplasm of breast    - REVIEW OF HEALTH MAINTENANCE PROTOCOL ORDERS  - PRIMARY CARE FOLLOW-UP SCHEDULING  - OFFICE/OUTPT VISIT,EST,LEVL IV    Screening for osteoporosis    - REVIEW OF HEALTH MAINTENANCE PROTOCOL ORDERS  - PRIMARY CARE FOLLOW-UP SCHEDULING  - OFFICE/OUTPT VISIT,EST,LEVL IV    Screen for colon cancer    - REVIEW OF HEALTH MAINTENANCE PROTOCOL ORDERS  - PRIMARY CARE FOLLOW-UP SCHEDULING  - OFFICE/OUTPT VISIT,EST,LEVL IV    Medication monitoring encounter    - REVIEW OF HEALTH MAINTENANCE PROTOCOL ORDERS  -  PRIMARY CARE FOLLOW-UP SCHEDULING  - OFFICE/OUTPT VISIT,EST,LEVL IV    Impacted cerumen of both ears - irrigated clear    - OH REMOVAL IMPACTED CERUMEN IRRIGATION/LVG UNILAT (RN/MA)  - hydrogen peroxide 3 % solution 1 mL      Patient has been advised of split billing requirements and indicates understanding: Yes    Counseling  Reviewed preventive health counseling, as reflected in patient instructions  Special attention given to:       Regular exercise       Healthy diet/nutrition       Vision screening       Hearing screening       Dental care       Fall risk prevention       Immunizations - reviewed, declines all       Alcohol Use        Colon cancer screening       The 10-year ASCVD risk score (Sharad FELIPE, et al., 2019) is: 12.4%    Values used to calculate the score:      Age: 73 years      Sex: Female      Is Non- : No      Diabetic: No      Tobacco smoker: No      Systolic Blood Pressure: 134 mmHg      Is BP treated: No      HDL Cholesterol: 43 mg/dL      Total Cholesterol: 153 mg/dL       Advanced Planning     Mammo negative  DEXA - osteopenia  Colonoscopy - due 7/2028  Pap - NA    Cerumen irrigation bilaterally    She reports that she has never smoked. She has never used smokeless tobacco.    Appropriate preventive services were discussed with this patient, including applicable screening as appropriate for fall prevention, nutrition, physical activity, Tobacco-use cessation, weight loss and cognition.  Checklist reviewing preventive services available has been given to the patient.    Reviewed patients plan of care and provided an AVS. The Intermediate Care Plan ( asthma action plan, low back pain action plan, and migraine action plan) for Jael meets the Care Plan requirement. This Care Plan has been established and reviewed with the Patient.          Signed Electronically by:           Ken Gardiner MD, FAAFP     [facility]  [id number]  [place] [address]  [email or web address]   Office: [phone]  Fax: [phone]       Identified Health Risks  I have reviewed Opioid Use Disorder and Substance Use Disorder risk factors and made any needed referrals.   She is at risk for lack of exercise and has been provided with information to increase physical activity for the benefit of her well-being.  The patient was counseled and encouraged to consider modifying their diet and eating habits. She was provided with information on recommended healthy diet options.

## 2024-01-26 LAB
CREAT UR-MCNC: 325 MG/DL
MICROALBUMIN UR-MCNC: 49.7 MG/L
MICROALBUMIN/CREAT UR: 15.29 MG/G CR (ref 0–25)

## 2024-01-28 NOTE — PATIENT INSTRUCTIONS
"Patient Education   Personalized Prevention Plan  You are due for the preventive services outlined below.  Your care team is available to assist you in scheduling these services.  If you have already completed any of these items, please share that information with your care team to update in your medical record.  Health Maintenance Due   Topic Date Due     RSV VACCINE (Pregnancy & 60+) (1 - 1-dose 60+ series) Never done     Zoster (Shingles) Vaccine (2 of 2) 03/17/2021     COVID-19 Vaccine (3 - Pfizer risk series) 02/18/2022     Flu Vaccine (1) 09/01/2023     PHQ-2 (once per calendar year)  01/01/2024     Learning About Being Physically Active  What is physical activity?     Being physically active means doing any kind of activity that gets your body moving.  The types of physical activity that can help you get fit and stay healthy include:  Aerobic or \"cardio\" activities. These make your heart beat faster and make you breathe harder, such as brisk walking, riding a bike, or running. They strengthen your heart and lungs and build up your endurance.  Strength training activities. These make your muscles work against, or \"resist,\" something. Examples include lifting weights or doing push-ups. These activities help tone and strengthen your muscles and bones.  Stretches. These let you move your joints and muscles through their full range of motion. Stretching helps you be more flexible.  Reaching a balance between these three types of physical activity is important because each one contributes to your overall fitness.  What are the benefits of being active?  Being active is one of the best things you can do for your health. It helps you to:  Feel stronger and have more energy to do all the things you like to do.  Focus better at school or work.  Feel, think, and sleep better.  Reach and stay at a healthy weight.  Lose fat and build lean muscle.  Lower your risk for serious health problems, including diabetes, heart " "attack, high blood pressure, and some cancers.  Keep your heart, lungs, bones, muscles, and joints strong and healthy.  How can you make being active part of your life?  Start slowly. Make it your long-term goal to get at least 30 minutes of exercise on most days of the week. Walking is a good choice. You also may want to do other activities, such as running, swimming, cycling, or playing tennis or team sports.  Pick activities that you like--ones that make your heart beat faster, your muscles stronger, and your muscles and joints more flexible. If you find more than one thing you like doing, do them all. You don't have to do the same thing every day.  Get your heart pumping every day. Any activity that makes your heart beat faster and keeps it at that rate for a while counts.  Here are some great ways to get your heart beating faster:  Go for a brisk walk, run, or hike.  Go for a swim or bike ride.  Take an online exercise class or dance.  Play a game of touch football, basketball, or soccer.  Play tennis, pickleball, or racquetball.  Climb stairs.  Even some household chores can be aerobic. Just do them at a faster pace. Raking or mowing the lawn, sweeping the garage, and vacuuming and cleaning your home all can help get your heart rate up.  Strengthen your muscles during the week. You don't have to lift heavy weights or grow big, bulky muscles to get stronger. Doing a few simple activities that make your muscles work against, or \"resist,\" something can help you get stronger. Aim for at least twice a week.  For example, you can:  Do push-ups or sit-ups, which use your own body weight as resistance.  Lift weights or dumbbells or use stretch bands at home or in a gym or community center.  Stretch your muscles often. Stretching will help you as you become more active. It can help you stay flexible and loosen tight muscles. It can also help improve your balance and posture and can be a great way to relax.  Be sure to " "stretch the muscles you'll be using when you work out. It's best to warm your muscles slightly before you stretch them. Walk or do some other light aerobic activity for a few minutes. Then start stretching.  When you stretch your muscles:  Do it slowly. Stretching is not about going fast or making sudden movements.  Don't push or bounce during a stretch.  Hold each stretch for at least 15 to 30 seconds, if you can. You should feel a stretch in the muscle, but not pain.  Breathe out as you do the stretch. Then breathe in as you hold the stretch. Don't hold your breath.  If you're worried about how more activity might affect your health, have a checkup before you start. Follow any special advice your doctor gives you for getting a smart start.  Where can you learn more?  Go to https://www.[employer].net/patiented  Enter W332 in the search box to learn more about \"Learning About Being Physically Active.\"  Current as of: June 6, 2023               Content Version: 13.8    5841-3674 [employer].   Care instructions adapted under license by your healthcare professional. If you have questions about a medical condition or this instruction, always ask your healthcare professional. [employer] disclaims any warranty or liability for your use of this information.      Learning About Dietary Guidelines  What are the Dietary Guidelines for Americans?     Dietary Guidelines for Americans provide tips for eating well and staying healthy. This helps reduce the risk for long-term (chronic) diseases.  These guidelines recommend that you:  Eat and drink the right amount for you. The [place] government's food guide is called MyPlate. It can help you make your own well-balanced eating plan.  Try to balance your eating with your activity. This helps you stay at a healthy weight.  Drink alcohol in moderation, if at all.  Limit foods high in salt, saturated fat, trans fat, and added sugar.  These guidelines are from " "the [place] Department of Agriculture and the [place] Department of Health and Human Services. They are updated every 5 years.  What is MyPlate?  MyPlate is the [place] government's food guide. It can help you make your own well-balanced eating plan. A balanced eating plan means that you eat enough, but not too much, and that your food gives you the nutrients you need to stay healthy.  MyPlate focuses on eating plenty of whole grains, fruits, and vegetables, and on limiting fat and sugar. It is available online at www.ChooseMyPlate.gov.  How can you get started?  If you're trying to eat healthier, you can slowly change your eating habits over time. You don't have to make big changes all at once. Start by adding one or two healthy foods to your meals each day.  Grains  Choose whole-grain breads and cereals and whole-wheat pasta and whole-grain crackers.  Vegetables  Eat a variety of vegetables every day. They have lots of nutrients and are part of a heart-healthy diet.  Fruits  Eat a variety of fruits every day. Fruits contain lots of nutrients. Choose fresh fruit instead of fruit juice.  Protein foods  Choose fish and lean poultry more often. Eat red meat and fried meats less often. Dried beans, tofu, and nuts are also good sources of protein.  Dairy  Choose low-fat or fat-free products from this food group. If you have problems digesting milk, try eating cheese or yogurt instead.  Fats and oils  Limit fats and oils if you're trying to cut calories. Choose healthy fats when you cook. These include canola oil and olive oil.  Where can you learn more?  Go to https://www.healthwise.net/patiented  Enter D676 in the search box to learn more about \"Learning About Dietary Guidelines.\"  Current as of: February 28, 2023               Content Version: 13.8    6344-6365 [employer].   Care instructions adapted under license by your healthcare professional. If you have questions about a medical condition or this " instruction, always ask your healthcare professional. [employer] disclaims any warranty or liability for your use of this information.

## 2024-01-30 ENCOUNTER — LAB (OUTPATIENT)
Dept: LAB | Facility: CLINIC | Age: 74
End: 2024-01-30
Payer: COMMERCIAL

## 2024-01-30 DIAGNOSIS — Z00.00 ROUTINE GENERAL MEDICAL EXAMINATION AT A HEALTH CARE FACILITY: ICD-10-CM

## 2024-01-30 LAB
ALBUMIN UR-MCNC: NEGATIVE MG/DL
APPEARANCE UR: CLEAR
BILIRUB UR QL STRIP: NEGATIVE
COLOR UR AUTO: YELLOW
GLUCOSE UR STRIP-MCNC: NEGATIVE MG/DL
HGB UR QL STRIP: ABNORMAL
KETONES UR STRIP-MCNC: NEGATIVE MG/DL
LEUKOCYTE ESTERASE UR QL STRIP: NEGATIVE
NITRATE UR QL: NEGATIVE
PH UR STRIP: 6.5 [PH] (ref 5–7)
RBC #/AREA URNS AUTO: NORMAL /HPF
SP GR UR STRIP: 1.02 (ref 1–1.03)
UROBILINOGEN UR STRIP-ACNC: 0.2 E.U./DL
WBC #/AREA URNS AUTO: NORMAL /HPF

## 2024-01-30 PROCEDURE — 81001 URINALYSIS AUTO W/SCOPE: CPT

## 2024-03-12 ENCOUNTER — MEDICAL CORRESPONDENCE (OUTPATIENT)
Dept: SCHEDULING | Facility: CLINIC | Age: 74
End: 2024-03-12
Payer: COMMERCIAL

## 2024-03-12 ENCOUNTER — TRANSFERRED RECORDS (OUTPATIENT)
Dept: HEALTH INFORMATION MANAGEMENT | Facility: CLINIC | Age: 74
End: 2024-03-12
Payer: COMMERCIAL

## 2024-03-12 LAB
ALT SERPL-CCNC: 11 IU/L (ref 0–32)
AST SERPL-CCNC: 19 IU/L (ref 0–40)

## 2024-04-18 ENCOUNTER — HOSPITAL ENCOUNTER (OUTPATIENT)
Dept: ULTRASOUND IMAGING | Facility: CLINIC | Age: 74
Discharge: HOME | End: 2024-04-18
Attending: INTERNAL MEDICINE | Admitting: INTERNAL MEDICINE
Payer: COMMERCIAL

## 2024-04-18 DIAGNOSIS — R74.8 ELEVATED ALKALINE PHOSPHATASE LEVEL: ICD-10-CM

## 2024-04-18 PROCEDURE — 76705 ECHO EXAM OF ABDOMEN: CPT

## 2024-04-26 ENCOUNTER — TRANSFERRED RECORDS (OUTPATIENT)
Dept: HEALTH INFORMATION MANAGEMENT | Facility: CLINIC | Age: 74
End: 2024-04-26
Payer: COMMERCIAL

## 2024-05-06 PROCEDURE — 82274 ASSAY TEST FOR BLOOD FECAL: CPT | Performed by: FAMILY MEDICINE

## 2024-05-07 ENCOUNTER — LAB (OUTPATIENT)
Dept: LAB | Facility: CLINIC | Age: 74
End: 2024-05-07
Payer: COMMERCIAL

## 2024-05-07 DIAGNOSIS — K57.30 DIVERTICULAR DISEASE OF COLON: Primary | ICD-10-CM

## 2024-05-07 LAB — HEMOCCULT STL QL IA: NEGATIVE

## 2024-06-17 PROBLEM — Z71.89 ADVANCED DIRECTIVES, COUNSELING/DISCUSSION: Status: RESOLVED | Noted: 2017-10-04 | Resolved: 2024-06-17

## 2024-08-08 ENCOUNTER — MYC MEDICAL ADVICE (OUTPATIENT)
Dept: FAMILY MEDICINE | Facility: CLINIC | Age: 74
End: 2024-08-08
Payer: COMMERCIAL

## 2024-11-12 ENCOUNTER — TRANSFERRED RECORDS (OUTPATIENT)
Dept: HEALTH INFORMATION MANAGEMENT | Facility: CLINIC | Age: 74
End: 2024-11-12
Payer: COMMERCIAL

## 2025-01-17 ENCOUNTER — ORDERS ONLY (AUTO-RELEASED) (OUTPATIENT)
Dept: FAMILY MEDICINE | Facility: CLINIC | Age: 75
End: 2025-01-17
Payer: COMMERCIAL

## 2025-01-17 DIAGNOSIS — Z00.00 MEDICARE ANNUAL WELLNESS VISIT, SUBSEQUENT: ICD-10-CM

## 2025-01-17 DIAGNOSIS — Z12.11 SCREEN FOR COLON CANCER: ICD-10-CM

## 2025-01-17 DIAGNOSIS — Z00.00 ROUTINE GENERAL MEDICAL EXAMINATION AT A HEALTH CARE FACILITY: ICD-10-CM

## 2025-01-17 DIAGNOSIS — D12.5 BENIGN NEOPLASM OF SIGMOID COLON: ICD-10-CM

## 2025-01-17 DIAGNOSIS — Z51.81 MEDICATION MONITORING ENCOUNTER: ICD-10-CM

## 2025-01-20 ENCOUNTER — HOSPITAL ENCOUNTER (OUTPATIENT)
Dept: MAMMOGRAPHY | Facility: CLINIC | Age: 75
Discharge: HOME | End: 2025-01-20
Attending: FAMILY MEDICINE | Admitting: FAMILY MEDICINE
Payer: COMMERCIAL

## 2025-01-20 DIAGNOSIS — Z12.31 VISIT FOR SCREENING MAMMOGRAM: ICD-10-CM

## 2025-01-20 PROCEDURE — 77067 SCR MAMMO BI INCL CAD: CPT

## 2025-01-20 PROCEDURE — 77063 BREAST TOMOSYNTHESIS BI: CPT

## 2025-01-22 ENCOUNTER — LAB (OUTPATIENT)
Dept: LAB | Facility: CLINIC | Age: 75
End: 2025-01-22
Payer: COMMERCIAL

## 2025-01-22 DIAGNOSIS — Z00.00 MEDICARE ANNUAL WELLNESS VISIT, SUBSEQUENT: ICD-10-CM

## 2025-01-22 DIAGNOSIS — R73.03 PREDIABETES: ICD-10-CM

## 2025-01-22 DIAGNOSIS — Z51.81 MEDICATION MONITORING ENCOUNTER: ICD-10-CM

## 2025-01-22 DIAGNOSIS — Z13.820 SCREENING FOR OSTEOPOROSIS: ICD-10-CM

## 2025-01-22 DIAGNOSIS — E78.5 HYPERLIPIDEMIA LDL GOAL <100: ICD-10-CM

## 2025-01-22 DIAGNOSIS — Z15.89 MTHFR MUTATION: ICD-10-CM

## 2025-01-22 DIAGNOSIS — K51.30 ULCERATIVE RECTOSIGMOIDITIS WITHOUT COMPLICATION (H): ICD-10-CM

## 2025-01-22 DIAGNOSIS — Z86.711 HISTORY OF PULMONARY EMBOLISM: ICD-10-CM

## 2025-01-22 DIAGNOSIS — Z86.718 HISTORY OF DEEP VENOUS THROMBOSIS: ICD-10-CM

## 2025-01-22 DIAGNOSIS — E72.12 METHYLENETETRAHYDROFOLATE REDUCTASE DEFICIENCY: ICD-10-CM

## 2025-01-22 DIAGNOSIS — Z00.00 ROUTINE GENERAL MEDICAL EXAMINATION AT A HEALTH CARE FACILITY: ICD-10-CM

## 2025-01-22 DIAGNOSIS — M85.89 OSTEOPENIA OF MULTIPLE SITES: ICD-10-CM

## 2025-01-22 DIAGNOSIS — E05.90 SUBCLINICAL HYPERTHYROIDISM: ICD-10-CM

## 2025-01-22 LAB
ALBUMIN SERPL BCG-MCNC: 4 G/DL (ref 3.5–5.2)
ALP SERPL-CCNC: 116 U/L (ref 40–150)
ALT SERPL W P-5'-P-CCNC: 15 U/L (ref 0–50)
ANION GAP SERPL CALCULATED.3IONS-SCNC: 11 MMOL/L (ref 7–15)
AST SERPL W P-5'-P-CCNC: 24 U/L (ref 0–45)
BILIRUB SERPL-MCNC: 0.3 MG/DL
BUN SERPL-MCNC: 14.8 MG/DL (ref 8–23)
CALCIUM SERPL-MCNC: 9.5 MG/DL (ref 8.8–10.4)
CHLORIDE SERPL-SCNC: 106 MMOL/L (ref 98–107)
CHOLEST SERPL-MCNC: 143 MG/DL
CK SERPL-CCNC: 38 U/L (ref 26–192)
CREAT SERPL-MCNC: 0.68 MG/DL (ref 0.51–0.95)
CRP SERPL-MCNC: 3.41 MG/L
EGFRCR SERPLBLD CKD-EPI 2021: >90 ML/MIN/1.73M2
ERYTHROCYTE [DISTWIDTH] IN BLOOD BY AUTOMATED COUNT: 14 % (ref 10–15)
ERYTHROCYTE [SEDIMENTATION RATE] IN BLOOD BY WESTERGREN METHOD: 19 MM/HR (ref 0–30)
EST. AVERAGE GLUCOSE BLD GHB EST-MCNC: 114 MG/DL
FASTING STATUS PATIENT QL REPORTED: YES
FASTING STATUS PATIENT QL REPORTED: YES
GLUCOSE SERPL-MCNC: 111 MG/DL (ref 70–99)
HBA1C MFR BLD: 5.6 % (ref 0–5.6)
HCO3 SERPL-SCNC: 24 MMOL/L (ref 22–29)
HCT VFR BLD AUTO: 38.5 % (ref 35–47)
HDLC SERPL-MCNC: 39 MG/DL
HGB BLD-MCNC: 12.8 G/DL (ref 11.7–15.7)
LDLC SERPL CALC-MCNC: 75 MG/DL
MCH RBC QN AUTO: 27.5 PG (ref 26.5–33)
MCHC RBC AUTO-ENTMCNC: 33.2 G/DL (ref 31.5–36.5)
MCV RBC AUTO: 83 FL (ref 78–100)
NONHDLC SERPL-MCNC: 104 MG/DL
PLATELET # BLD AUTO: 208 10E3/UL (ref 150–450)
POTASSIUM SERPL-SCNC: 4.1 MMOL/L (ref 3.4–5.3)
PROT SERPL-MCNC: 7.4 G/DL (ref 6.4–8.3)
RBC # BLD AUTO: 4.65 10E6/UL (ref 3.8–5.2)
SODIUM SERPL-SCNC: 141 MMOL/L (ref 135–145)
T4 FREE SERPL-MCNC: 1.37 NG/DL (ref 0.9–1.7)
TRIGL SERPL-MCNC: 143 MG/DL
TSH SERPL DL<=0.005 MIU/L-ACNC: 0.35 UIU/ML (ref 0.3–4.2)
VIT D+METAB SERPL-MCNC: 79 NG/ML (ref 20–50)
WBC # BLD AUTO: 6 10E3/UL (ref 4–11)

## 2025-01-22 PROCEDURE — 82306 VITAMIN D 25 HYDROXY: CPT

## 2025-01-22 PROCEDURE — 84443 ASSAY THYROID STIM HORMONE: CPT

## 2025-01-22 PROCEDURE — 82550 ASSAY OF CK (CPK): CPT

## 2025-01-22 PROCEDURE — 80053 COMPREHEN METABOLIC PANEL: CPT

## 2025-01-22 PROCEDURE — 80061 LIPID PANEL: CPT

## 2025-01-22 PROCEDURE — 85027 COMPLETE CBC AUTOMATED: CPT

## 2025-01-22 PROCEDURE — 84439 ASSAY OF FREE THYROXINE: CPT

## 2025-01-22 PROCEDURE — 83036 HEMOGLOBIN GLYCOSYLATED A1C: CPT

## 2025-01-22 PROCEDURE — 85652 RBC SED RATE AUTOMATED: CPT

## 2025-01-22 PROCEDURE — 36415 COLL VENOUS BLD VENIPUNCTURE: CPT

## 2025-01-22 PROCEDURE — 86140 C-REACTIVE PROTEIN: CPT

## 2025-01-22 SDOH — HEALTH STABILITY: PHYSICAL HEALTH: ON AVERAGE, HOW MANY DAYS PER WEEK DO YOU ENGAGE IN MODERATE TO STRENUOUS EXERCISE (LIKE A BRISK WALK)?: 0 DAYS

## 2025-01-22 SDOH — HEALTH STABILITY: PHYSICAL HEALTH: ON AVERAGE, HOW MANY MINUTES DO YOU ENGAGE IN EXERCISE AT THIS LEVEL?: 0 MIN

## 2025-01-22 ASSESSMENT — SOCIAL DETERMINANTS OF HEALTH (SDOH): HOW OFTEN DO YOU GET TOGETHER WITH FRIENDS OR RELATIVES?: ONCE A WEEK

## 2025-01-25 LAB
CREAT UR-MCNC: 46.1 MG/DL
MICROALBUMIN UR-MCNC: <12 MG/L
MICROALBUMIN/CREAT UR: NORMAL MG/G{CREAT}

## 2025-01-27 ENCOUNTER — OFFICE VISIT (OUTPATIENT)
Dept: FAMILY MEDICINE | Facility: CLINIC | Age: 75
End: 2025-01-27
Attending: FAMILY MEDICINE
Payer: COMMERCIAL

## 2025-01-27 VITALS
TEMPERATURE: 97.8 F | RESPIRATION RATE: 18 BRPM | HEIGHT: 58 IN | BODY MASS INDEX: 32.35 KG/M2 | WEIGHT: 154.1 LBS | OXYGEN SATURATION: 97 % | SYSTOLIC BLOOD PRESSURE: 142 MMHG | DIASTOLIC BLOOD PRESSURE: 86 MMHG | HEART RATE: 111 BPM

## 2025-01-27 DIAGNOSIS — K63.5 POLYP OF COLON, UNSPECIFIED PART OF COLON, UNSPECIFIED TYPE: ICD-10-CM

## 2025-01-27 DIAGNOSIS — F43.21 GRIEF: ICD-10-CM

## 2025-01-27 DIAGNOSIS — Z15.89 MTHFR MUTATION: ICD-10-CM

## 2025-01-27 DIAGNOSIS — E72.12 METHYLENETETRAHYDROFOLATE REDUCTASE DEFICIENCY: ICD-10-CM

## 2025-01-27 DIAGNOSIS — Z86.711 HISTORY OF PULMONARY EMBOLISM: ICD-10-CM

## 2025-01-27 DIAGNOSIS — M85.89 OSTEOPENIA OF MULTIPLE SITES: ICD-10-CM

## 2025-01-27 DIAGNOSIS — E78.5 HYPERLIPIDEMIA LDL GOAL <100: ICD-10-CM

## 2025-01-27 DIAGNOSIS — Z00.00 MEDICARE ANNUAL WELLNESS VISIT, SUBSEQUENT: ICD-10-CM

## 2025-01-27 DIAGNOSIS — Z13.820 SCREENING FOR OSTEOPOROSIS: ICD-10-CM

## 2025-01-27 DIAGNOSIS — K51.30 ULCERATIVE RECTOSIGMOIDITIS WITHOUT COMPLICATION (H): ICD-10-CM

## 2025-01-27 DIAGNOSIS — R73.03 PREDIABETES: ICD-10-CM

## 2025-01-27 DIAGNOSIS — Z00.00 ROUTINE GENERAL MEDICAL EXAMINATION AT A HEALTH CARE FACILITY: Primary | ICD-10-CM

## 2025-01-27 DIAGNOSIS — Z12.31 ENCOUNTER FOR SCREENING MAMMOGRAM FOR MALIGNANT NEOPLASM OF BREAST: ICD-10-CM

## 2025-01-27 DIAGNOSIS — Z12.11 SCREEN FOR COLON CANCER: ICD-10-CM

## 2025-01-27 DIAGNOSIS — M15.0 PRIMARY OSTEOARTHRITIS INVOLVING MULTIPLE JOINTS: ICD-10-CM

## 2025-01-27 DIAGNOSIS — Z86.718 HISTORY OF DEEP VENOUS THROMBOSIS: ICD-10-CM

## 2025-01-27 DIAGNOSIS — Z51.81 MEDICATION MONITORING ENCOUNTER: ICD-10-CM

## 2025-01-27 DIAGNOSIS — E05.90 SUBCLINICAL HYPERTHYROIDISM: ICD-10-CM

## 2025-01-27 DIAGNOSIS — D12.5 BENIGN NEOPLASM OF SIGMOID COLON: ICD-10-CM

## 2025-01-27 PROCEDURE — G0439 PPPS, SUBSEQ VISIT: HCPCS | Performed by: FAMILY MEDICINE

## 2025-01-27 PROCEDURE — G2211 COMPLEX E/M VISIT ADD ON: HCPCS | Performed by: FAMILY MEDICINE

## 2025-01-27 PROCEDURE — 99213 OFFICE O/P EST LOW 20 MIN: CPT | Mod: 25 | Performed by: FAMILY MEDICINE

## 2025-01-27 RX ORDER — ATORVASTATIN CALCIUM 20 MG/1
20 TABLET, FILM COATED ORAL DAILY
Qty: 90 TABLET | Refills: 3 | Status: SHIPPED | OUTPATIENT
Start: 2025-01-27

## 2025-01-27 ASSESSMENT — PAIN SCALES - GENERAL: PAINLEVEL_OUTOF10: NO PAIN (0)

## 2025-01-27 NOTE — PROGRESS NOTES
Preventive Care Visit  [facility] [place]  Ken Gardiner MD, Family Medicine  Jan 27, 2025      Assessment & Plan     Routine general medical examination at a health care facility    - PRIMARY CARE FOLLOW-UP SCHEDULING  - REVIEW OF HEALTH MAINTENANCE PROTOCOL ORDERS  - PRIMARY CARE FOLLOW-UP SCHEDULING  - REVIEW OF HEALTH MAINTENANCE PROTOCOL ORDERS    Medicare annual wellness visit, subsequent    - PRIMARY CARE FOLLOW-UP SCHEDULING  - REVIEW OF HEALTH MAINTENANCE PROTOCOL ORDERS  - PRIMARY CARE FOLLOW-UP SCHEDULING  - REVIEW OF HEALTH MAINTENANCE PROTOCOL ORDERS    Ulcerative rectosigmoiditis 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 (H)    - PRIMARY CARE FOLLOW-UP SCHEDULING  - REVIEW OF HEALTH MAINTENANCE PROTOCOL ORDERS  - PRIMARY CARE FOLLOW-UP SCHEDULING  - REVIEW OF HEALTH MAINTENANCE PROTOCOL ORDERS  - OFFICE/OUTPT VISIT,EST,LEVL IV    Prediabetes    - PRIMARY CARE FOLLOW-UP SCHEDULING  - REVIEW OF HEALTH MAINTENANCE PROTOCOL ORDERS  - PRIMARY CARE FOLLOW-UP SCHEDULING  - REVIEW OF HEALTH MAINTENANCE PROTOCOL ORDERS  - atorvastatin (LIPITOR) 20 MG tablet  Dispense: 90 tablet; Refill: 3  - OFFICE/OUTPT VISIT,EST,LEVL IV    Hyperlipidemia LDL goal <100    - PRIMARY CARE FOLLOW-UP SCHEDULING  - REVIEW OF HEALTH MAINTENANCE PROTOCOL ORDERS  - PRIMARY CARE FOLLOW-UP SCHEDULING  - REVIEW OF HEALTH MAINTENANCE PROTOCOL ORDERS  - atorvastatin (LIPITOR) 20 MG tablet  Dispense: 90 tablet; Refill: 3  - OFFICE/OUTPT VISIT,EST,LEVL IV    History of deep venous thrombosis    - PRIMARY CARE FOLLOW-UP SCHEDULING  - REVIEW OF HEALTH MAINTENANCE PROTOCOL ORDERS  - PRIMARY CARE FOLLOW-UP SCHEDULING  - REVIEW OF HEALTH MAINTENANCE PROTOCOL ORDERS  - OFFICE/OUTPT VISIT,EST,LEVL IV    History of pulmonary embolism    - PRIMARY CARE FOLLOW-UP SCHEDULING  - REVIEW OF HEALTH MAINTENANCE PROTOCOL ORDERS  - PRIMARY CARE FOLLOW-UP SCHEDULING  - REVIEW OF HEALTH MAINTENANCE PROTOCOL ORDERS  - OFFICE/OUTPT VISIT,EST,LEVL IV    MTHFR  mutation    - PRIMARY CARE FOLLOW-UP SCHEDULING  - REVIEW OF HEALTH MAINTENANCE PROTOCOL ORDERS  - PRIMARY CARE FOLLOW-UP SCHEDULING  - REVIEW OF HEALTH MAINTENANCE PROTOCOL ORDERS  - OFFICE/OUTPT VISIT,EST,LEVL IV    Methylenetetrahydrofolate reductase deficiency    - PRIMARY CARE FOLLOW-UP SCHEDULING  - REVIEW OF HEALTH MAINTENANCE PROTOCOL ORDERS  - PRIMARY CARE FOLLOW-UP SCHEDULING  - REVIEW OF HEALTH MAINTENANCE PROTOCOL ORDERS  - OFFICE/OUTPT VISIT,EST,LEVL IV    Subclinical hyperthyroidism    - PRIMARY CARE FOLLOW-UP SCHEDULING  - REVIEW OF HEALTH MAINTENANCE PROTOCOL ORDERS  - PRIMARY CARE FOLLOW-UP SCHEDULING  - REVIEW OF HEALTH MAINTENANCE PROTOCOL ORDERS  - OFFICE/OUTPT VISIT,EST,LEVL IV    Osteopenia of multiple sites    - PRIMARY CARE FOLLOW-UP SCHEDULING  - REVIEW OF HEALTH MAINTENANCE PROTOCOL ORDERS  - PRIMARY CARE FOLLOW-UP SCHEDULING  - REVIEW OF HEALTH MAINTENANCE PROTOCOL ORDERS  - OFFICE/OUTPT VISIT,EST,LEVL IV    Primary osteoarthritis involving multiple joints    - PRIMARY CARE FOLLOW-UP SCHEDULING  - REVIEW OF HEALTH MAINTENANCE PROTOCOL ORDERS  - PRIMARY CARE FOLLOW-UP SCHEDULING  - REVIEW OF HEALTH MAINTENANCE PROTOCOL ORDERS  - OFFICE/OUTPT VISIT,EST,LEVL IV    Grief    - REVIEW OF HEALTH MAINTENANCE PROTOCOL ORDERS  - PRIMARY CARE FOLLOW-UP SCHEDULING  - REVIEW OF HEALTH MAINTENANCE PROTOCOL ORDERS  - OFFICE/OUTPT VISIT,EST,LEVL IV    Polyp of colon, unspecified part of colon, unspecified type    - REVIEW OF HEALTH MAINTENANCE PROTOCOL ORDERS  - PRIMARY CARE FOLLOW-UP SCHEDULING  - REVIEW OF HEALTH MAINTENANCE PROTOCOL ORDERS  - OFFICE/OUTPT VISIT,EST,LEVL IV    Benign neoplasm of sigmoid colon    - PRIMARY CARE FOLLOW-UP SCHEDULING  - REVIEW OF HEALTH MAINTENANCE PROTOCOL ORDERS  - PRIMARY CARE FOLLOW-UP SCHEDULING  - REVIEW OF HEALTH MAINTENANCE PROTOCOL ORDERS  - OFFICE/OUTPT VISIT,EST,LEVL IV    Screen for colon cancer    - PRIMARY CARE FOLLOW-UP SCHEDULING  - REVIEW OF HEALTH  "MAINTENANCE PROTOCOL ORDERS  - PRIMARY CARE FOLLOW-UP SCHEDULING  - REVIEW OF HEALTH MAINTENANCE PROTOCOL ORDERS  - OFFICE/OUTPT VISIT,EST,LEVL IV    Encounter for screening mammogram for malignant neoplasm of breast    - PRIMARY CARE FOLLOW-UP SCHEDULING  - REVIEW OF HEALTH MAINTENANCE PROTOCOL ORDERS  - PRIMARY CARE FOLLOW-UP SCHEDULING  - REVIEW OF HEALTH MAINTENANCE PROTOCOL ORDERS  - OFFICE/OUTPT VISIT,EST,LEVL IV    Screening for osteoporosis    - PRIMARY CARE FOLLOW-UP SCHEDULING  - REVIEW OF HEALTH MAINTENANCE PROTOCOL ORDERS  - PRIMARY CARE FOLLOW-UP SCHEDULING  - REVIEW OF HEALTH MAINTENANCE PROTOCOL ORDERS  - OFFICE/OUTPT VISIT,EST,LEVL IV    Medication monitoring encounter    - PRIMARY CARE FOLLOW-UP SCHEDULING  - REVIEW OF HEALTH MAINTENANCE PROTOCOL ORDERS  - PRIMARY CARE FOLLOW-UP SCHEDULING  - REVIEW OF HEALTH MAINTENANCE PROTOCOL ORDERS  - OFFICE/OUTPT VISIT,EST,LEVL IV    Patient has been advised of split billing requirements and indicates understanding: Yes    BMI  Estimated body mass index is 32.77 kg/m  as calculated from the following:    Height as of this encounter: 1.461 m (4' 9.5\").    Weight as of this encounter: 69.9 kg (154 lb 1.6 oz).   Weight management plan: diet and exercise    Counseling  Appropriate preventive services were addressed with this patient via screening, questionnaire, or discussion as appropriate for fall prevention, nutrition, physical activity, Tobacco-use cessation, social engagement, weight loss and cognition.  Checklist reviewing preventive services available has been given to the patient.  Reviewed patient's diet, addressing concerns and/or questions.   The patient was instructed to see the dentist every 6 months.     Work on weight loss  Regular exercise    Plan:    1) Medications: reviewed, refilled, considering GLP 1    2) Labs: reviewed    3) Immunizations: will get Shingrix #2 when due, declines all others    4) Imaging/Diagnostics: recent mammogram    5) " Consults: consider weight loss clinic    Return for Complete Physical, Medication Recheck Visit, Follow Up Chronic.    35 minutes spent by myself on the date of the encounter doing chart review, history and exam, documentation and further activities per the note.    The longitudinal plan of care for the diagnosis(es)/condition(s) as documented were addressed during this visit. Due to the added complexity in care, I will continue to support Pat in the subsequent management and with ongoing continuity of care.    Maikel Young is a 74 year old, presenting for the following:  Physical        1/27/2025     9:34 AM   Additional Questions   Roomed by Rosemary HINOJOSA   Accompanied by Self         Ulcerative colitis - overall doing well - follows with GI - Dr Antonio    Prediabetes    Lab Results   Component Value Date    A1C 5.6 01/22/2025    A1C 5.6 01/23/2024    A1C 5.7 01/19/2023    A1C 5.7 01/19/2022    A1C 5.8 12/16/2020    A1C 5.7 12/18/2019    A1C 5.6 08/14/2019    A1C 5.7 10/31/2018    A1C 5.7 08/14/2015     Hyperlipidemia Follow-Up    Are you regularly taking any medication or supplement to lower your cholesterol?   Yes- atorvastatin  Are you having muscle aches or other side effects that you think could be caused by your cholesterol lowering medication?  No    Recent Labs   Lab Test 01/22/25  0907 01/23/24  0855   CHOL 143 153   HDL 39* 43*   LDL 75 86   TRIG 143 119     Hx of PE / DVT / MTHFR - no current anticoagulation - hematology    Hypothyroid    TSH   Date Value Ref Range Status   01/22/2025 0.35 0.30 - 4.20 uIU/mL Final   01/19/2022 0.17 (L) 0.40 - 4.00 mU/L Final   05/04/2021 0.32 (L) 0.40 - 4.00 mU/L Final     OA / Osteopenia - doing well    Grief - doing well    Health Care Directive  Patient does not have a Health Care Directive: Patient states has Advance Directive and will bring in a copy to clinic.      1/22/2025   General Health   How would you rate your overall physical health? Good   Feel stress  (tense, anxious, or unable to sleep) Not at all         1/22/2025   Nutrition   Diet: Regular (no restrictions)         1/22/2025   Exercise   Days per week of moderate/strenous exercise 0 days   Average minutes spent exercising at this level 0 min   (!) EXERCISE CONCERN      1/22/2025   Social Factors   Frequency of gathering with friends or relatives Once a week   Worry food won't last until get money to buy more No   Food not last or not have enough money for food? No   Do you have housing? (Housing is defined as stable permanent housing and does not include staying ouside in a car, in a tent, in an abandoned building, in an overnight shelter, or couch-surfing.) Yes   Are you worried about losing your housing? No   Lack of transportation? No   Unable to get utilities (heat,electricity)? No         1/22/2025   Fall Risk   Fallen 2 or more times in the past year? No    No   Trouble with walking or balance? No    No       Multiple values from one day are sorted in reverse-chronological order          1/22/2025   Activities of Daily Living- Home Safety   Needs help with the following daily activites None of the above   Safety concerns in the home None of the above         1/22/2025   Dental   Dentist two times every year? (!) NO         1/22/2025   Hearing Screening   Hearing concerns? None of the above         1/22/2025   Driving Risk Screening   Patient/family members have concerns about driving No         1/22/2025   General Alertness/Fatigue Screening   Have you been more tired than usual lately? No         1/22/2025   Urinary Incontinence Screening   Bothered by leaking urine in past 6 months No         1/22/2025   TB Screening   Were you born outside of the [place]? No         Today's PHQ-2 Score:       1/26/2025     9:57 AM   PHQ-2 ( 1999 Pfizer)   Q1: Little interest or pleasure in doing things 0   Q2: Feeling down, depressed or hopeless 0   PHQ-2 Score 0    Q1: Little interest or pleasure in doing things Not at  all   Q2: Feeling down, depressed or hopeless Not at all   PHQ-2 Score 0       Patient-reported           2025   Substance Use   Alcohol more than 3/day or more than 7/wk No   Do you have a current opioid prescription? No   How severe/bad is pain from 1 to 10? 0/10 (No Pain)   Do you use any other substances recreationally? No     Social History     Tobacco Use    Smoking status: Never    Smokeless tobacco: Never    Tobacco comments:     30 years ago   Vaping Use    Vaping status: Never Used   Substance Use Topics    Alcohol use: Yes     Alcohol/week: 3.0 - 4.0 standard drinks of alcohol     Types: 3 - 4 Standard drinks or equivalent per week     Comment: 1-2 drinks about 2 times/week    Drug use: Never           2025   LAST FHS-7 RESULTS   1st degree relative breast or ovarian cancer No   Any relative bilateral breast cancer No   Any male have breast cancer No   Any ONE woman have BOTH breast AND ovarian cancer No   Any woman with breast cancer before 50yrs No   2 or more relatives with breast AND/OR ovarian cancer No   2 or more relatives with breast AND/OR bowel cancer No        Annual mammogram    ASCVD Risk   The 10-year ASCVD risk score (Sharad FELIPE, et al., 2019) is: 15.2%    Values used to calculate the score:      Age: 74 years      Sex: Female      Is Non- : No      Diabetic: No      Tobacco smoker: No      Systolic Blood Pressure: 142 mmHg      Is BP treated: No      HDL Cholesterol: 39 mg/dL      Total Cholesterol: 143 mg/dL    Fracture Risk Assessment Tool  Link to Frax Calculator  Use the information below to complete the Frax calculator  : 1950  Sex: female  Weight (kg): 69.9 kg (actual weight)  Height (cm): 146.1 cm  Previous Fragility Fracture:  No  History of parent with fractured hip:  No  Current Smoking:  No  Patient has been on glucocorticoids for more than 3 months (5mg/day or more): No  Rheumatoid Arthritis on Problem List:  No  Secondary  Osteoporosis on Problem List:  No  Consumes 3 or more units of alcohol per day: No  Femoral Neck BMD (g/cm2)            Reviewed and updated as needed this visit by Provider                  Current providers sharing in care for this patient include:  Patient Care Team:  Ken Gardiner MD as PCP - General (Family Practice)  Ken Gardiner MD as Assigned PCP  Vesna Egan RD as Diabetes Educator (Dietitian, Registered)  Rony Everett MD as MD (Endocrinology, Diabetes, and Metabolism)  Christina Jones PA-C as Physician Assistant (Dermatology)  Ken Gardiner MD as Referring Physician (Family Medicine)    The following health maintenance items are reviewed in Epic and correct as of today:  Health Maintenance   Topic Date Due    RSV VACCINE (1 - Risk 60-74 years 1-dose series) Never done    COVID-19 Vaccine (3 - Pfizer risk series) 02/18/2022    INFLUENZA VACCINE (1) 09/01/2024    MAMMO SCREENING  01/20/2026    ALT  01/22/2026    LIPID  01/22/2026    MEDICARE ANNUAL WELLNESS VISIT  01/27/2026    ANNUAL REVIEW OF HM ORDERS  01/27/2026    FALL RISK ASSESSMENT  01/27/2026    COLORECTAL CANCER SCREENING  07/20/2026    DEXA  01/19/2027    GLUCOSE  01/22/2028    ADVANCE CARE PLANNING  01/28/2029    DTAP/TDAP/TD IMMUNIZATION (2 - Td or Tdap) 01/20/2031    HEPATITIS C SCREENING  Completed    PHQ-2 (once per calendar year)  Completed    Pneumococcal Vaccine: 50+ Years  Addressed    ZOSTER IMMUNIZATION  Addressed    HPV IMMUNIZATION  Aged Out    MENINGITIS IMMUNIZATION  Aged Out    RSV MONOCLONAL ANTIBODY  Aged Out     Patient Active Problem List   Diagnosis    Chondromalacia of patella    Hyperlipidemia LDL goal <100    S/P shoulder surgery    Rotator cuff (capsule) sprain    Osteopenia    Carpal tunnel syndrome    Hyperglycemia    Subclinical hyperthyroidism    Osteoarthrosis of knee    History of pulmonary embolism    Primary osteoarthritis of right knee    MTHFR mutation    History of deep venous thrombosis     Elevated serum protein level    Ulcerative rectosigmoiditis 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 (H)    Microscopic hematuria    Colon polyps    Primary osteoarthritis involving multiple joints    Class 1 obesity with serious comorbidity and body mass index (BMI) of 32.0 to 32.9 in adult, unspecified obesity type    Prediabetes    Methylenetetrahydrofolate reductase deficiency    Abnormal blood chemistry level    Abnormal liver function tests    Benign neoplasm of sigmoid colon    Collagenous colitis    Diarrhea    Diverticular disease of colon    Diverticular disease of large intestine    Hemorrhoids    Noninfectious gastroenteritis       Past Medical History:   Diagnosis Date    Colon polyps 12/2017    tubular adenoma x 1 - 3mm - due 5 yrs    Diverticulosis     DVT (deep venous thrombosis) (H) 2011    PE - postop    Elevated serum protein level     Protein S    History of thrombophlebitis     Hyperlipidemia LDL goal <100 2006    Microscopic hematuria 2019    work up negative - Dr Redmond    MTHFR mutation     Mumps     Osteoarthrosis of knee 2014    Dr Augustine    Osteopenia     Prediabetes 2020    Pulmonary emboli (H) 2011    Post op    Pulmonary embolism and infarction (H) 10/05/2011     Problem list name updated by automated process. Provider to review    Subclinical hyperthyroidism 2014    Dr Everett    Ulcerative rectosigmoiditis 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 (H) 2011, 9/17    [facility] - Dr Telles/Paco - left sided       Past Surgical History:   Procedure Laterality Date    COLONOSCOPY  09, 9/15, 12/17    colitis, tubular adenoma x 1 - 3mm - diverticulosis - due 5 yrs    COLONOSCOPY  07/2023    diverticulosis, UC, Due 3 yrs    FLEXIBLE SIGMOIDOSCOPY  12/2018    benign, quiscient UC    SIGMOIDOSCOPY FLEXIBLE  09/2017    rectosigmoiditis    SURGICAL HISTORY OF -  Right 2011    Rt Rotator Cuff       Current Outpatient Medications   Medication Sig Dispense Refill    aspirin EC 81 MG tablet Take 81 mg by mouth daily.      atorvastatin (LIPITOR)  20 MG tablet Take 1 tablet (20 mg) by mouth daily. 90 tablet 3    balsalazide (COLAZAL) 750 MG capsule Take 4 capsules (3,000 mg) by mouth 2 times daily      cholecalciferol (VITAMIN D3) 5000 UNITS CAPS capsule Take by mouth every other day      clobetasol (TEMOVATE) 0.05 % external cream Apply topically 2 times daily      multivitamin  peds with iron (FLINTSTONES COMPLETE) 60 MG chewable tablet Take 1 chew tab by mouth daily.         No Known Allergies    Family History   Problem Relation Age of Onset    Alcohol/Drug Father     Lipids Father         high cholesterol    Diabetes Paternal Grandmother     Diabetes Son         diet controlled    Lung Cancer Maternal Grandfather         smoker    Coronary Artery Disease Brother        Social History     Socioeconomic History    Marital status:      Spouse name: Anand    Number of children: 1    Years of education: 12    Highest education level: None   Tobacco Use    Smoking status: Never    Smokeless tobacco: Never    Tobacco comments:     30 years ago   Vaping Use    Vaping status: Never Used   Substance and Sexual Activity    Alcohol use: Yes     Alcohol/week: 3.0 - 4.0 standard drinks of alcohol     Types: 3 - 4 Standard drinks or equivalent per week     Comment: 1-2 drinks about 2 times/week    Drug use: Never    Sexual activity: Not Currently     Partners: Male     Birth control/protection: None   Other Topics Concern    Parent/sibling w/ CABG, MI or angioplasty before 65F 55M? No    Caffeine Concern Yes     Comment: 2-4 cans per week    Exercise Yes     Comment: 6,000 to 9,000 steps daily    Seat Belt Yes     Social Drivers of Health     Financial Resource Strain: Low Risk  (1/22/2025)    Financial Resource Strain     Within the past 12 months, have you or your family members you live with been unable to get utilities (heat, electricity) when it was really needed?: No   Food Insecurity: Low Risk  (1/22/2025)    Food Insecurity     Within the past 12 months,  did you worry that your food would run out before you got money to buy more?: No     Within the past 12 months, did the food you bought just not last and you didn t have money to get more?: No   Transportation Needs: Low Risk  (1/22/2025)    Transportation Needs     Within the past 12 months, has lack of transportation kept you from medical appointments, getting your medicines, non-medical meetings or appointments, work, or from getting things that you need?: No   Physical Activity: Inactive (1/22/2025)    Exercise Vital Sign     Days of Exercise per Week: 0 days     Minutes of Exercise per Session: 0 min   Stress: No Stress Concern Present (1/22/2025)    [employer] - Occupational Stress Questionnaire     Feeling of Stress : Not at all   Social Connections: Unknown (1/22/2025)    Social Connection and Isolation Panel [NHANES]     Frequency of Social Gatherings with Friends and Family: Once a week   Interpersonal Safety: Low Risk  (1/27/2025)    Interpersonal Safety     Do you feel physically and emotionally safe where you currently live?: Yes     Within the past 12 months, have you been hit, slapped, kicked or otherwise physically hurt by someone?: No     Within the past 12 months, have you been humiliated or emotionally abused in other ways by your partner or ex-partner?: No   Housing Stability: Low Risk  (1/22/2025)    Housing Stability     Do you have housing? : Yes     Are you worried about losing your housing?: No       Colonoscopy: due 7/2026  FIT / Cologuard: ordered  Pap: NA  Mammogram: due 1/2026  DEXA: due 1/2026    Review of Systems  CONSTITUTIONAL: NEGATIVE for fever, chills, change in weight  INTEGUMENTARY/SKIN: NEGATIVE for worrisome rashes, moles or lesions  EYES: NEGATIVE for vision changes or irritation  ENT/MOUTH: NEGATIVE for ear, mouth and throat problems  RESP: NEGATIVE for significant cough or SOB  CV: NEGATIVE for chest pain, palpitations or peripheral edema  GI:  "NEGATIVE for nausea, abdominal pain, heartburn, or change in bowel habits  : NEGATIVE for frequency, dysuria, or hematuria  MUSCULOSKELETAL: NEGATIVE for significant arthralgias or myalgia  NEURO: NEGATIVE for weakness, dizziness or paresthesias  ENDOCRINE: NEGATIVE for temperature intolerance, skin/hair changes  HEME: NEGATIVE for bleeding problems  PSYCHIATRIC: NEGATIVE for changes in mood or affect     Objective    Exam  BP (!) 142/86 (BP Location: Right arm)   Pulse (!) 111   Temp 97.8  F (36.6  C) (Tympanic)   Resp 18   Ht 1.461 m (4' 9.5\")   Wt 69.9 kg (154 lb 1.6 oz)   LMP 06/03/2004   SpO2 97%   BMI 32.77 kg/m     Estimated body mass index is 32.77 kg/m  as calculated from the following:    Height as of this encounter: 1.461 m (4' 9.5\").    Weight as of this encounter: 69.9 kg (154 lb 1.6 oz).    Physical Exam  GENERAL: alert and no distress  EYES: Eyes grossly normal to inspection, PERRL and conjunctivae and sclerae normal  HENT: ear canals and TM's normal, nose and mouth without ulcers or lesions  NECK: no adenopathy, no asymmetry, masses, or scars  RESP: lungs clear to auscultation - no rales, rhonchi or wheezes  BREAST: pt declines  CV: regular rate and rhythm, normal S1 S2, no S3 or S4, no murmur, click or rub, no peripheral edema  ABDOMEN: soft, nontender, no hepatosplenomegaly, no masses and bowel sounds normal   (female): pt declines  RECTAL: pt declines  MS: no gross musculoskeletal defects noted, no edema  SKIN: no suspicious lesions or rashes  NEURO: Normal strength and tone, mentation intact and speech normal  PSYCH: mentation appears normal, affect normal/bright         1/27/2025   Mini Cog   Clock Draw Score 2 Normal   3 Item Recall 3 objects recalled   Mini Cog Total Score 5         Signed Electronically by:            Ken Gardiner MD, FAAFP     [facility]  [address] [place] [id number]  [email or web address]  " [email or web address]   Office: [phone]  Fax: [phone]

## 2025-04-29 ENCOUNTER — TELEPHONE (OUTPATIENT)
Dept: FAMILY MEDICINE | Facility: CLINIC | Age: 75
End: 2025-04-29
Payer: COMMERCIAL

## 2025-04-29 NOTE — TELEPHONE ENCOUNTER
Reason for Call:  Appointment Request    Patient requesting this type of appt: Pre-op    Requested provider: Ken Gardiner    Reason patient unable to be scheduled: Needs to be scheduled by clinic    When does patient want to be seen/preferred time:  before june    Comments: na    Could we send this information to you in [employer] or would you prefer to receive a phone call?:   Patient would prefer a phone call   Okay to leave a detailed message?: Yes at Cell number on file:    Telephone Information:   Mobile [phone]       Call taken on 4/29/2025 at 3:04 PM by Swetha Tena

## 2025-05-12 ENCOUNTER — OFFICE VISIT (OUTPATIENT)
Dept: FAMILY MEDICINE | Facility: CLINIC | Age: 75
End: 2025-05-12
Payer: COMMERCIAL

## 2025-05-12 VITALS
HEART RATE: 101 BPM | WEIGHT: 153 LBS | OXYGEN SATURATION: 96 % | BODY MASS INDEX: 32.12 KG/M2 | SYSTOLIC BLOOD PRESSURE: 140 MMHG | HEIGHT: 58 IN | DIASTOLIC BLOOD PRESSURE: 70 MMHG | RESPIRATION RATE: 13 BRPM | TEMPERATURE: 98.1 F

## 2025-05-12 DIAGNOSIS — H25.9 SENILE CATARACT OF RIGHT EYE, UNSPECIFIED AGE-RELATED CATARACT TYPE: ICD-10-CM

## 2025-05-12 DIAGNOSIS — R73.03 PREDIABETES: ICD-10-CM

## 2025-05-12 DIAGNOSIS — Z86.718 HISTORY OF DEEP VENOUS THROMBOSIS: ICD-10-CM

## 2025-05-12 DIAGNOSIS — Z86.711 HISTORY OF PULMONARY EMBOLISM: ICD-10-CM

## 2025-05-12 DIAGNOSIS — Z01.818 PREOP EXAMINATION: Primary | ICD-10-CM

## 2025-05-12 DIAGNOSIS — E78.5 HYPERLIPIDEMIA LDL GOAL <100: ICD-10-CM

## 2025-05-12 PROCEDURE — 3078F DIAST BP <80 MM HG: CPT | Performed by: NURSE PRACTITIONER

## 2025-05-12 PROCEDURE — 99213 OFFICE O/P EST LOW 20 MIN: CPT | Performed by: NURSE PRACTITIONER

## 2025-05-12 PROCEDURE — 3077F SYST BP >= 140 MM HG: CPT | Performed by: NURSE PRACTITIONER

## 2025-05-12 NOTE — PROGRESS NOTES
Preoperative Evaluation  [address] [place]  [facility] [address]  Phone: [phone]  Primary Provider: Ken Gardiner MD  Pre-op Performing Provider: Ally Granado CNP  May 12, 2025             5/7/2025   Surgical Information   What procedure is being done? Cataract Surgery - RIGHT   Facility or Hospital where procedure/surgery will be performed: [facility]   Who is doing the procedure / surgery? Dr Erika Berrios   Date of surgery / procedure: 06/03/2025   Time of surgery / procedure: 12:15p   Where do you plan to recover after surgery? at home with family     Fax number for surgical facility: [phone]    Assessment & Plan     The proposed surgical procedure is considered INTERMEDIATE risk.    Preop examination    Senile cataract of right eye, unspecified age-related cataract type    Prediabetes    Hyperlipidemia LDL goal <100    History of deep venous thrombosis  History of pulmonary embolism  After her shoulder surgery. No other episode of clotting.            - No identified additional risk factors other than previously addressed    Antiplatelet or Anticoagulation Medication Instructions   - We reviewed the medication list and the patient is not on an antiplatelet or anticoagulation medications.    Additional Medication Instructions  We reviewed the medication list and there are no chronic medications that need to be adjusted for this procedure.    Recommendation  Approval given to proceed with proposed procedure, without further diagnostic evaluation.      Ally Granado CNP      Subjective   Hannah is a 75 year old, presenting for the following:  Pre-Op Exam          5/12/2025     9:50 AM   Additional Questions   [facility]   Accompanied by Self     HPI: upcoming cataract surgery          5/7/2025   Pre-Op Questionnaire   Have you ever had a heart attack or stroke? No   Have you ever had surgery on your heart or blood vessels, such  as a stent placement, a coronary artery bypass, or surgery on an artery in your head, neck, heart, or legs? No   Do you have chest pain with activity? No   Do you have a history of heart failure? No   Do you currently have a cold, bronchitis or symptoms of other infection? No   Do you have a cough, shortness of breath, or wheezing? No   Do you or anyone in your family have previous history of blood clots? (!) YES history of PE   Do you or does anyone in your family have a serious bleeding problem such as prolonged bleeding following surgeries or cuts? No   Have you ever had problems with anemia or been told to take iron pills? No   Have you had any abnormal blood loss such as black, tarry or bloody stools, or abnormal vaginal bleeding? No   Have you ever had a blood transfusion? No   Are you willing to have a blood transfusion if it is medically needed before, during, or after your surgery? Yes   Have you or any of your relatives ever had problems with anesthesia? No   Do you have sleep apnea, excessive snoring or daytime drowsiness? No   Do you have any artifical heart valves or other implanted medical devices like a pacemaker, defibrillator, or continuous glucose monitor? No   Do you have artificial joints? No   Are you allergic to latex? No     Advance Care Planning    Patient states has Health Care Directive and will send to [employer] [employer].    Preoperative Review of    reviewed - no record of controlled substances prescribed.      Status of Chronic Conditions:  See problem list for active medical problems.  Problems all longstanding and stable, except as noted/documented.  See ROS for pertinent symptoms related to these conditions.    Patient Active Problem List    Diagnosis Date Noted    Methylenetetrahydrofolate reductase deficiency 01/21/2022     Priority: Medium    Prediabetes 2020     Priority: Medium    Class 1 obesity with serious comorbidity and body mass index (BMI) of 32.0 to 32.9 in adult,  unspecified obesity type 05/08/2019     Priority: Medium    Primary osteoarthritis involving multiple joints 10/31/2018     Priority: Medium    Hemorrhoids 05/08/2018     Priority: Medium    Benign neoplasm of sigmoid colon 12/18/2017     Priority: Medium    Diverticular disease of large intestine 12/14/2017     Priority: Medium    Colon polyps 12/01/2017     Priority: Medium     tubular adenoma x 1 - 3mm - due 5 yrs      Diarrhea 11/21/2017     Priority: Medium    Microscopic hematuria 10/14/2017     Priority: Medium    Ulcerative rectosigmoiditis 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 (H) 05/20/2017     Priority: Medium    History of deep venous thrombosis 10/04/2016     Priority: Medium    MTHFR mutation      Priority: Medium    Elevated serum protein level      Priority: Medium     Protein S      Primary osteoarthritis of right knee 12/18/2015     Priority: Medium    Abnormal blood chemistry level 10/30/2015     Priority: Medium    Abnormal liver function tests 10/30/2015     Priority: Medium    Collagenous colitis 10/19/2015     Priority: Medium    History of pulmonary embolism 09/22/2015     Priority: Medium    Diverticular disease of colon 09/11/2015     Priority: Medium    Osteoarthrosis of knee 01/02/2015     Priority: Medium    Hyperglycemia 08/26/2014     Priority: Medium    Subclinical hyperthyroidism 08/26/2014     Priority: Medium    Carpal tunnel syndrome 04/24/2014     Priority: Medium    Osteopenia 07/18/2013     Priority: Medium    Rotator cuff (capsule) sprain 10/19/2011     Priority: Medium    S/P shoulder surgery 10/05/2011     Priority: Medium    Noninfectious gastroenteritis 06/06/2011     Priority: Medium    Hyperlipidemia LDL goal <100 10/31/2010     Priority: Medium    Chondromalacia of patella 07/20/2007     Priority: Medium      Past Medical History:   Diagnosis Date    Colon polyps 12/2017    tubular adenoma x 1 - 3mm - due 5 yrs    Diverticulosis     DVT (deep venous thrombosis) (H) 2011    PE - postop     Elevated serum protein level     Protein S    History of thrombophlebitis     Hyperlipidemia LDL goal <100 2006    Microscopic hematuria 2019    work up negative - Dr Redmond    MTHFR mutation     Mumps     Osteoarthrosis of knee 2014    Dr Augustine    Osteopenia     Prediabetes 2020    Pulmonary emboli (H) 2011    Post op    Pulmonary embolism and infarction (H) 10/05/2011     Problem list name updated by automated process. Provider to review    Subclinical hyperthyroidism 2014    Dr Everett    Ulcerative rectosigmoiditis 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 (H) 2011, 9/17    [facility] - Dr Telles/Paco - left sided     Past Surgical History:   Procedure Laterality Date    COLONOSCOPY  09, 9/15, 12/17    colitis, tubular adenoma x 1 - 3mm - diverticulosis - due 5 yrs    COLONOSCOPY  07/2023    diverticulosis, UC, Due 3 yrs    FLEXIBLE SIGMOIDOSCOPY  12/2018    benign, quiscient UC    SIGMOIDOSCOPY FLEXIBLE  09/2017    rectosigmoiditis    SURGICAL HISTORY OF -  Right 2011    Rt Rotator Cuff     Current Outpatient Medications   Medication Sig Dispense Refill    aspirin EC 81 MG tablet Take 81 mg by mouth daily.      atorvastatin (LIPITOR) 20 MG tablet Take 1 tablet (20 mg) by mouth daily. 90 tablet 3    balsalazide (COLAZAL) 750 MG capsule Take 4 capsules (3,000 mg) by mouth 2 times daily      cholecalciferol (VITAMIN D3) 5000 UNITS CAPS capsule Take by mouth every other day      clobetasol (TEMOVATE) 0.05 % external cream Apply topically 2 times daily      multivitamin  peds with iron (FLINTSTONES COMPLETE) 60 MG chewable tablet Take 1 chew tab by mouth daily.         No Known Allergies     Social History     Tobacco Use    Smoking status: Never    Smokeless tobacco: Never    Tobacco comments:     30 years ago   Substance Use Topics    Alcohol use: Yes     Alcohol/week: 3.0 - 4.0 standard drinks of alcohol     Types: 3 - 4 Standard drinks or equivalent per week     Comment: 1-2 drinks about 2 times/week     Family History   Problem  "Relation Age of Onset    Alcohol/Drug Father     Lipids Father         high cholesterol    Diabetes Paternal Grandmother     Diabetes Son         diet controlled    Lung Cancer Maternal Grandfather         smoker    Coronary Artery Disease Brother      History   Drug Use Unknown             Review of Systems  Constitutional, HEENT, cardiovascular, pulmonary, GI, , musculoskeletal, neuro, skin, endocrine and psych systems are negative, except as otherwise noted.    Objective    BP (!) 164/88 (BP Location: Right arm, Patient Position: Chair, Cuff Size: Adult Regular)   Pulse 101   Temp 98.1  F (36.7  C) (Tympanic)   Resp 13   Ht 1.461 m (4' 9.5\")   Wt 69.4 kg (153 lb)   LMP 06/03/2004   SpO2 96%   BMI 32.54 kg/m     Estimated body mass index is 32.54 kg/m  as calculated from the following:    Height as of this encounter: 1.461 m (4' 9.5\").    Weight as of this encounter: 69.4 kg (153 lb).  Physical Exam  GENERAL: alert and no distress  EYES: Eyes grossly normal to inspection, PERRL and conjunctivae and sclerae normal  HENT: ear canals and TM's normal, nose and mouth without ulcers or lesions  NECK: no adenopathy, no asymmetry, masses, or scars  RESP: lungs clear to auscultation - no rales, rhonchi or wheezes  CV: regular rate and rhythm, normal S1 S2, no S3 or S4, no murmur, click or rub, no peripheral edema  ABDOMEN: soft, nontender, no hepatosplenomegaly, no masses and bowel sounds normal  MS: no gross musculoskeletal defects noted, no edema  SKIN: no suspicious lesions or rashes  NEURO: Normal strength and tone, mentation intact and speech normal  PSYCH: mentation appears normal, affect normal/bright    Recent Labs   Lab Test 01/22/25  0907   HGB 12.8         POTASSIUM 4.1   CR 0.68   A1C 5.6        Diagnostics  No labs were ordered during this visit.   No EKG required, no history of coronary heart disease, significant arrhythmia, peripheral arterial disease or other structural heart " disease.    Revised Cardiac Risk Index (RCRI)  The patient has the following serious cardiovascular risks for perioperative complications:   - No serious cardiac risks = 0 points     RCRI Interpretation: 0 points: Class I (very low risk - 0.4% complication rate)         Signed Electronically by: Ally Granado CNP  A copy of this evaluation report is provided to the requesting physician.
